# Patient Record
Sex: FEMALE | Race: WHITE | Employment: OTHER | URBAN - METROPOLITAN AREA
[De-identification: names, ages, dates, MRNs, and addresses within clinical notes are randomized per-mention and may not be internally consistent; named-entity substitution may affect disease eponyms.]

---

## 2020-06-19 ENCOUNTER — HOSPITAL ENCOUNTER (INPATIENT)
Age: 70
LOS: 1 days | Discharge: HOME | DRG: 247 | End: 2020-06-20
Attending: EMERGENCY MEDICINE | Admitting: INTERNAL MEDICINE
Payer: MEDICARE

## 2020-06-19 ENCOUNTER — APPOINTMENT (OUTPATIENT)
Dept: GENERAL RADIOLOGY | Age: 70
DRG: 247 | End: 2020-06-19
Attending: EMERGENCY MEDICINE
Payer: MEDICARE

## 2020-06-19 ENCOUNTER — APPOINTMENT (OUTPATIENT)
Dept: NON INVASIVE DIAGNOSTICS | Age: 70
DRG: 247 | End: 2020-06-19
Attending: INTERNAL MEDICINE
Payer: MEDICARE

## 2020-06-19 DIAGNOSIS — I48.0 PAROXYSMAL ATRIAL FIBRILLATION (HCC): Primary | ICD-10-CM

## 2020-06-19 DIAGNOSIS — I21.4 NSTEMI (NON-ST ELEVATED MYOCARDIAL INFARCTION) (HCC): ICD-10-CM

## 2020-06-19 PROBLEM — E03.9 HYPOTHYROID: Status: ACTIVE | Noted: 2020-06-19

## 2020-06-19 PROBLEM — I48.91 ATRIAL FIBRILLATION WITH RAPID VENTRICULAR RESPONSE (HCC): Status: ACTIVE | Noted: 2020-06-19

## 2020-06-19 LAB
ALBUMIN SERPL-MCNC: 3.7 G/DL (ref 3.5–5)
ALBUMIN/GLOB SERPL: 0.9 {RATIO} (ref 1.1–2.2)
ALP SERPL-CCNC: 47 U/L (ref 45–117)
ALT SERPL-CCNC: 52 U/L (ref 12–78)
ANION GAP SERPL CALC-SCNC: 5 MMOL/L (ref 5–15)
APTT PPP: 22.2 SEC (ref 22.1–32)
APTT PPP: 51.3 SEC (ref 22.1–32)
AST SERPL-CCNC: 37 U/L (ref 15–37)
AV VELOCITY RATIO: 0.5
AV VTI RATIO: 0.6
BASOPHILS # BLD: 0 K/UL (ref 0–0.1)
BASOPHILS NFR BLD: 1 % (ref 0–1)
BILIRUB SERPL-MCNC: 0.4 MG/DL (ref 0.2–1)
BNP SERPL-MCNC: 536 PG/ML
BUN SERPL-MCNC: 18 MG/DL (ref 6–20)
BUN/CREAT SERPL: 14 (ref 12–20)
CALCIUM SERPL-MCNC: 9.6 MG/DL (ref 8.5–10.1)
CHLORIDE SERPL-SCNC: 102 MMOL/L (ref 97–108)
CK SERPL-CCNC: 175 U/L (ref 26–192)
CO2 SERPL-SCNC: 31 MMOL/L (ref 21–32)
COMMENT, HOLDF: NORMAL
CREAT SERPL-MCNC: 1.26 MG/DL (ref 0.55–1.02)
DIFFERENTIAL METHOD BLD: NORMAL
ECHO AO ROOT DIAM: 3.09 CM
ECHO AV AREA PEAK VELOCITY: 1.5 CM2
ECHO AV AREA VTI: 1.7 CM2
ECHO AV AREA/BSA PEAK VELOCITY: 0.8 CM2/M2
ECHO AV AREA/BSA VTI: 0.9 CM2/M2
ECHO AV MEAN GRADIENT: 8.7 MMHG
ECHO AV MEAN VELOCITY: 1.38 M/S
ECHO AV PEAK GRADIENT: 17.5 MMHG
ECHO AV PEAK VELOCITY: 209.2 CM/S
ECHO AV VTI: 41.09 CM
ECHO LA AREA 4C: 12.2 CM2
ECHO LA MAJOR AXIS: 3.3 CM
ECHO LA TO AORTIC ROOT RATIO: 1.07
ECHO LA VOL 2C: 36.15 ML (ref 22–52)
ECHO LA VOL 4C: 24.43 ML (ref 22–52)
ECHO LA VOL BP: 30.6 ML (ref 22–52)
ECHO LA VOL/BSA BIPLANE: 16.42 ML/M2 (ref 16–28)
ECHO LA VOLUME INDEX A2C: 19.39 ML/M2 (ref 16–28)
ECHO LA VOLUME INDEX A4C: 13.11 ML/M2 (ref 16–28)
ECHO LV E' LATERAL VELOCITY: 4.66 CENTIMETER/SECOND
ECHO LV E' SEPTAL VELOCITY: 5.19 CENTIMETER/SECOND
ECHO LV EDV TEICHHOLZ: 0.66 ML
ECHO LV ESV TEICHHOLZ: 0.28 ML
ECHO LV INTERNAL DIMENSION DIASTOLIC: 4.81 CM (ref 3.9–5.3)
ECHO LV INTERNAL DIMENSION SYSTOLIC: 3.34 CM
ECHO LV IVSD: 0.72 CM (ref 0.6–0.9)
ECHO LV MASS 2D: 123.5 G (ref 67–162)
ECHO LV MASS INDEX 2D: 66.3 G/M2 (ref 43–95)
ECHO LV POSTERIOR WALL DIASTOLIC: 0.71 CM (ref 0.6–0.9)
ECHO LVOT DIAM: 1.94 CM
ECHO LVOT PEAK GRADIENT: 4.3 MMHG
ECHO LVOT PEAK VELOCITY: 103.56 CM/S
ECHO LVOT SV: 71.5 ML
ECHO LVOT VTI: 24.09 CM
ECHO RV INTERNAL DIMENSION: 2.96 CM
ECHO RV TAPSE: 1.32 CM (ref 1.5–2)
EOSINOPHIL # BLD: 0.1 K/UL (ref 0–0.4)
EOSINOPHIL NFR BLD: 3 % (ref 0–7)
ERYTHROCYTE [DISTWIDTH] IN BLOOD BY AUTOMATED COUNT: 12.4 % (ref 11.5–14.5)
GLOBULIN SER CALC-MCNC: 4.1 G/DL (ref 2–4)
GLUCOSE SERPL-MCNC: 130 MG/DL (ref 65–100)
HCT VFR BLD AUTO: 38.2 % (ref 35–47)
HGB BLD-MCNC: 12.9 G/DL (ref 11.5–16)
IMM GRANULOCYTES # BLD AUTO: 0 K/UL (ref 0–0.04)
IMM GRANULOCYTES NFR BLD AUTO: 0 % (ref 0–0.5)
LVFS 2D: 30.43 %
LVOT MG: 2.19 MMHG
LVOT MV: 0.7 CM/S
LVSV (TEICH): 32.98 ML
LYMPHOCYTES # BLD: 1.7 K/UL (ref 0.8–3.5)
LYMPHOCYTES NFR BLD: 41 % (ref 12–49)
MAGNESIUM SERPL-MCNC: 1.9 MG/DL (ref 1.6–2.4)
MCH RBC QN AUTO: 31 PG (ref 26–34)
MCHC RBC AUTO-ENTMCNC: 33.8 G/DL (ref 30–36.5)
MCV RBC AUTO: 91.8 FL (ref 80–99)
MONOCYTES # BLD: 0.4 K/UL (ref 0–1)
MONOCYTES NFR BLD: 9 % (ref 5–13)
NEUTS SEG # BLD: 1.9 K/UL (ref 1.8–8)
NEUTS SEG NFR BLD: 46 % (ref 32–75)
NRBC # BLD: 0 K/UL (ref 0–0.01)
NRBC BLD-RTO: 0 PER 100 WBC
PHOSPHATE SERPL-MCNC: 3.6 MG/DL (ref 2.6–4.7)
PLATELET # BLD AUTO: 211 K/UL (ref 150–400)
PMV BLD AUTO: 10.1 FL (ref 8.9–12.9)
POTASSIUM SERPL-SCNC: 3.3 MMOL/L (ref 3.5–5.1)
PROT SERPL-MCNC: 7.8 G/DL (ref 6.4–8.2)
RBC # BLD AUTO: 4.16 M/UL (ref 3.8–5.2)
SAMPLES BEING HELD,HOLD: NORMAL
SODIUM SERPL-SCNC: 138 MMOL/L (ref 136–145)
THERAPEUTIC RANGE,PTTT: ABNORMAL SECS (ref 58–77)
THERAPEUTIC RANGE,PTTT: NORMAL SECS (ref 58–77)
TROPONIN I BLD-MCNC: 0.1 NG/ML (ref 0–0.08)
TROPONIN I SERPL-MCNC: 0.16 NG/ML
TROPONIN I SERPL-MCNC: 0.77 NG/ML
TSH SERPL DL<=0.05 MIU/L-ACNC: 2.32 UIU/ML (ref 0.36–3.74)
WBC # BLD AUTO: 4.2 K/UL (ref 3.6–11)

## 2020-06-19 PROCEDURE — 77030029065 HC DRSG HEMO QCLOT ZMED -B

## 2020-06-19 PROCEDURE — 3E03317 INTRODUCTION OF OTHER THROMBOLYTIC INTO PERIPHERAL VEIN, PERCUTANEOUS APPROACH: ICD-10-PCS | Performed by: INTERNAL MEDICINE

## 2020-06-19 PROCEDURE — 84484 ASSAY OF TROPONIN QUANT: CPT

## 2020-06-19 PROCEDURE — 77030038269 HC DRN EXT URIN PURWCK BARD -A

## 2020-06-19 PROCEDURE — 99285 EMERGENCY DEPT VISIT HI MDM: CPT

## 2020-06-19 PROCEDURE — 83880 ASSAY OF NATRIURETIC PEPTIDE: CPT

## 2020-06-19 PROCEDURE — 99153 MOD SED SAME PHYS/QHP EA: CPT | Performed by: INTERNAL MEDICINE

## 2020-06-19 PROCEDURE — 74011000258 HC RX REV CODE- 258: Performed by: INTERNAL MEDICINE

## 2020-06-19 PROCEDURE — 74011000258 HC RX REV CODE- 258: Performed by: EMERGENCY MEDICINE

## 2020-06-19 PROCEDURE — 027035Z DILATION OF CORONARY ARTERY, ONE ARTERY WITH TWO DRUG-ELUTING INTRALUMINAL DEVICES, PERCUTANEOUS APPROACH: ICD-10-PCS | Performed by: INTERNAL MEDICINE

## 2020-06-19 PROCEDURE — C1769 GUIDE WIRE: HCPCS | Performed by: INTERNAL MEDICINE

## 2020-06-19 PROCEDURE — 85347 COAGULATION TIME ACTIVATED: CPT

## 2020-06-19 PROCEDURE — 83735 ASSAY OF MAGNESIUM: CPT

## 2020-06-19 PROCEDURE — B2111ZZ FLUOROSCOPY OF MULTIPLE CORONARY ARTERIES USING LOW OSMOLAR CONTRAST: ICD-10-PCS | Performed by: INTERNAL MEDICINE

## 2020-06-19 PROCEDURE — 84443 ASSAY THYROID STIM HORMONE: CPT

## 2020-06-19 PROCEDURE — C1874 STENT, COATED/COV W/DEL SYS: HCPCS | Performed by: INTERNAL MEDICINE

## 2020-06-19 PROCEDURE — C1894 INTRO/SHEATH, NON-LASER: HCPCS | Performed by: INTERNAL MEDICINE

## 2020-06-19 PROCEDURE — C1753 CATH, INTRAVAS ULTRASOUND: HCPCS | Performed by: INTERNAL MEDICINE

## 2020-06-19 PROCEDURE — 84100 ASSAY OF PHOSPHORUS: CPT

## 2020-06-19 PROCEDURE — 85025 COMPLETE CBC W/AUTO DIFF WBC: CPT

## 2020-06-19 PROCEDURE — 93306 TTE W/DOPPLER COMPLETE: CPT

## 2020-06-19 PROCEDURE — 74011250637 HC RX REV CODE- 250/637: Performed by: INTERNAL MEDICINE

## 2020-06-19 PROCEDURE — 85730 THROMBOPLASTIN TIME PARTIAL: CPT

## 2020-06-19 PROCEDURE — 4A023N7 MEASUREMENT OF CARDIAC SAMPLING AND PRESSURE, LEFT HEART, PERCUTANEOUS APPROACH: ICD-10-PCS | Performed by: INTERNAL MEDICINE

## 2020-06-19 PROCEDURE — 93005 ELECTROCARDIOGRAM TRACING: CPT

## 2020-06-19 PROCEDURE — 77030004522 HC CATH ANGI DX EXPO BSC -A: Performed by: INTERNAL MEDICINE

## 2020-06-19 PROCEDURE — 92928 PRQ TCAT PLMT NTRAC ST 1 LES: CPT | Performed by: INTERNAL MEDICINE

## 2020-06-19 PROCEDURE — 74011636320 HC RX REV CODE- 636/320: Performed by: INTERNAL MEDICINE

## 2020-06-19 PROCEDURE — 93458 L HRT ARTERY/VENTRICLE ANGIO: CPT | Performed by: INTERNAL MEDICINE

## 2020-06-19 PROCEDURE — 77030013715 HC INFL SYS MRTM -B: Performed by: INTERNAL MEDICINE

## 2020-06-19 PROCEDURE — 74011250636 HC RX REV CODE- 250/636: Performed by: INTERNAL MEDICINE

## 2020-06-19 PROCEDURE — 74011000250 HC RX REV CODE- 250: Performed by: EMERGENCY MEDICINE

## 2020-06-19 PROCEDURE — C1725 CATH, TRANSLUMIN NON-LASER: HCPCS | Performed by: INTERNAL MEDICINE

## 2020-06-19 PROCEDURE — 96374 THER/PROPH/DIAG INJ IV PUSH: CPT

## 2020-06-19 PROCEDURE — 77030012468 HC VLV BLEEDBK CNTRL ABBT -B: Performed by: INTERNAL MEDICINE

## 2020-06-19 PROCEDURE — 99152 MOD SED SAME PHYS/QHP 5/>YRS: CPT | Performed by: INTERNAL MEDICINE

## 2020-06-19 PROCEDURE — B2151ZZ FLUOROSCOPY OF LEFT HEART USING LOW OSMOLAR CONTRAST: ICD-10-PCS | Performed by: INTERNAL MEDICINE

## 2020-06-19 PROCEDURE — 80053 COMPREHEN METABOLIC PANEL: CPT

## 2020-06-19 PROCEDURE — 36415 COLL VENOUS BLD VENIPUNCTURE: CPT

## 2020-06-19 PROCEDURE — 74011250637 HC RX REV CODE- 250/637: Performed by: EMERGENCY MEDICINE

## 2020-06-19 PROCEDURE — 96376 TX/PRO/DX INJ SAME DRUG ADON: CPT

## 2020-06-19 PROCEDURE — 77030004532 HC CATH ANGI DX IMP BSC -A: Performed by: INTERNAL MEDICINE

## 2020-06-19 PROCEDURE — 74011250637 HC RX REV CODE- 250/637: Performed by: SPECIALIST

## 2020-06-19 PROCEDURE — 74011250636 HC RX REV CODE- 250/636: Performed by: EMERGENCY MEDICINE

## 2020-06-19 PROCEDURE — 77030008543 HC TBNG MON PRSS MRTM -A: Performed by: INTERNAL MEDICINE

## 2020-06-19 PROCEDURE — 74011000250 HC RX REV CODE- 250: Performed by: INTERNAL MEDICINE

## 2020-06-19 PROCEDURE — C1887 CATHETER, GUIDING: HCPCS | Performed by: INTERNAL MEDICINE

## 2020-06-19 PROCEDURE — 71045 X-RAY EXAM CHEST 1 VIEW: CPT

## 2020-06-19 PROCEDURE — 65660000000 HC RM CCU STEPDOWN

## 2020-06-19 PROCEDURE — 82550 ASSAY OF CK (CPK): CPT

## 2020-06-19 PROCEDURE — 77030019569 HC BND COMPR RAD TERU -B: Performed by: INTERNAL MEDICINE

## 2020-06-19 DEVICE — XIENCE SIERRA™ EVEROLIMUS ELUTING CORONARY STENT SYSTEM 2.50 MM X 33 MM / RAPID-EXCHANGE
Type: IMPLANTABLE DEVICE | Status: FUNCTIONAL
Brand: XIENCE SIERRA™

## 2020-06-19 DEVICE — XIENCE SIERRA™ EVEROLIMUS ELUTING CORONARY STENT SYSTEM 2.50 MM X 18 MM / RAPID-EXCHANGE
Type: IMPLANTABLE DEVICE | Status: FUNCTIONAL
Brand: XIENCE SIERRA™

## 2020-06-19 RX ORDER — SODIUM CHLORIDE 0.9 % (FLUSH) 0.9 %
5-40 SYRINGE (ML) INJECTION AS NEEDED
Status: DISCONTINUED | OUTPATIENT
Start: 2020-06-19 | End: 2020-06-20 | Stop reason: HOSPADM

## 2020-06-19 RX ORDER — VITAMIN E 1000 UNIT
1000 CAPSULE ORAL DAILY
COMMUNITY

## 2020-06-19 RX ORDER — VERAPAMIL HYDROCHLORIDE 2.5 MG/ML
INJECTION, SOLUTION INTRAVENOUS AS NEEDED
Status: DISCONTINUED | OUTPATIENT
Start: 2020-06-19 | End: 2020-06-19 | Stop reason: HOSPADM

## 2020-06-19 RX ORDER — HEPARIN SODIUM 1000 [USP'U]/ML
INJECTION, SOLUTION INTRAVENOUS; SUBCUTANEOUS AS NEEDED
Status: DISCONTINUED | OUTPATIENT
Start: 2020-06-19 | End: 2020-06-19 | Stop reason: HOSPADM

## 2020-06-19 RX ORDER — SODIUM CHLORIDE 0.9 % (FLUSH) 0.9 %
5-40 SYRINGE (ML) INJECTION EVERY 8 HOURS
Status: DISCONTINUED | OUTPATIENT
Start: 2020-06-19 | End: 2020-06-20 | Stop reason: HOSPADM

## 2020-06-19 RX ORDER — MORPHINE SULFATE 2 MG/ML
2 INJECTION, SOLUTION INTRAMUSCULAR; INTRAVENOUS
Status: DISCONTINUED | OUTPATIENT
Start: 2020-06-19 | End: 2020-06-20 | Stop reason: HOSPADM

## 2020-06-19 RX ORDER — HYDROCODONE BITARTRATE AND ACETAMINOPHEN 5; 325 MG/1; MG/1
1 TABLET ORAL
Status: DISCONTINUED | OUTPATIENT
Start: 2020-06-19 | End: 2020-06-20 | Stop reason: HOSPADM

## 2020-06-19 RX ORDER — GUAIFENESIN 100 MG/5ML
81 LIQUID (ML) ORAL DAILY
Qty: 1 TAB | Refills: 1 | Status: SHIPPED
Start: 2020-06-20 | End: 2020-09-03

## 2020-06-19 RX ORDER — GUAIFENESIN 100 MG/5ML
81 LIQUID (ML) ORAL DAILY
Status: DISCONTINUED | OUTPATIENT
Start: 2020-06-20 | End: 2020-06-20 | Stop reason: HOSPADM

## 2020-06-19 RX ORDER — METOPROLOL TARTRATE 25 MG/1
25 TABLET, FILM COATED ORAL EVERY 12 HOURS
Status: DISCONTINUED | OUTPATIENT
Start: 2020-06-19 | End: 2020-06-19 | Stop reason: SDUPTHER

## 2020-06-19 RX ORDER — CHOLECALCIFEROL TAB 125 MCG (5000 UNIT) 125 MCG
5000 TAB ORAL DAILY
COMMUNITY

## 2020-06-19 RX ORDER — HEPARIN SODIUM 10000 [USP'U]/100ML
12-25 INJECTION, SOLUTION INTRAVENOUS
Status: DISCONTINUED | OUTPATIENT
Start: 2020-06-19 | End: 2020-06-19

## 2020-06-19 RX ORDER — LOSARTAN POTASSIUM AND HYDROCHLOROTHIAZIDE 25; 100 MG/1; MG/1
1 TABLET ORAL
COMMUNITY
End: 2020-06-20

## 2020-06-19 RX ORDER — NALOXONE HYDROCHLORIDE 0.4 MG/ML
0.4 INJECTION, SOLUTION INTRAMUSCULAR; INTRAVENOUS; SUBCUTANEOUS AS NEEDED
Status: DISCONTINUED | OUTPATIENT
Start: 2020-06-19 | End: 2020-06-20 | Stop reason: HOSPADM

## 2020-06-19 RX ORDER — ASPIRIN 325 MG
325 TABLET ORAL ONCE
Status: COMPLETED | OUTPATIENT
Start: 2020-06-19 | End: 2020-06-19

## 2020-06-19 RX ORDER — SODIUM CHLORIDE 9 MG/ML
INJECTION, SOLUTION INTRAVENOUS
Status: COMPLETED | OUTPATIENT
Start: 2020-06-19 | End: 2020-06-19

## 2020-06-19 RX ORDER — LIDOCAINE HYDROCHLORIDE 10 MG/ML
INJECTION INFILTRATION; PERINEURAL AS NEEDED
Status: DISCONTINUED | OUTPATIENT
Start: 2020-06-19 | End: 2020-06-19 | Stop reason: HOSPADM

## 2020-06-19 RX ORDER — LEVOTHYROXINE SODIUM 125 UG/1
125 TABLET ORAL
COMMUNITY

## 2020-06-19 RX ORDER — CLONAZEPAM 0.5 MG/1
0.5 TABLET ORAL
COMMUNITY
End: 2021-03-28

## 2020-06-19 RX ORDER — ROSUVASTATIN CALCIUM 5 MG/1
5 TABLET, COATED ORAL
Qty: 30 TAB | Refills: 2 | Status: SHIPPED | OUTPATIENT
Start: 2020-06-19 | End: 2020-06-26 | Stop reason: SDUPTHER

## 2020-06-19 RX ORDER — POTASSIUM CHLORIDE 750 MG/1
40 TABLET, FILM COATED, EXTENDED RELEASE ORAL
Status: COMPLETED | OUTPATIENT
Start: 2020-06-19 | End: 2020-06-19

## 2020-06-19 RX ORDER — ENOXAPARIN SODIUM 100 MG/ML
1 INJECTION SUBCUTANEOUS
Status: DISCONTINUED | OUTPATIENT
Start: 2020-06-19 | End: 2020-06-19

## 2020-06-19 RX ORDER — SODIUM CHLORIDE 9 MG/ML
75 INJECTION, SOLUTION INTRAVENOUS CONTINUOUS
Status: DISPENSED | OUTPATIENT
Start: 2020-06-19 | End: 2020-06-20

## 2020-06-19 RX ORDER — HEPARIN SODIUM 5000 [USP'U]/ML
4000 INJECTION, SOLUTION INTRAVENOUS; SUBCUTANEOUS ONCE
Status: COMPLETED | OUTPATIENT
Start: 2020-06-19 | End: 2020-06-19

## 2020-06-19 RX ORDER — FENTANYL CITRATE 50 UG/ML
INJECTION, SOLUTION INTRAMUSCULAR; INTRAVENOUS AS NEEDED
Status: DISCONTINUED | OUTPATIENT
Start: 2020-06-19 | End: 2020-06-19 | Stop reason: HOSPADM

## 2020-06-19 RX ORDER — GLUCOSAM/CHONDRO/HERB 149/HYAL 750-100 MG
3 TABLET ORAL DAILY
COMMUNITY

## 2020-06-19 RX ORDER — DOCUSATE SODIUM 100 MG/1
100 CAPSULE, LIQUID FILLED ORAL 2 TIMES DAILY
Status: DISCONTINUED | OUTPATIENT
Start: 2020-06-19 | End: 2020-06-20 | Stop reason: HOSPADM

## 2020-06-19 RX ORDER — MIDAZOLAM HYDROCHLORIDE 1 MG/ML
INJECTION, SOLUTION INTRAMUSCULAR; INTRAVENOUS AS NEEDED
Status: DISCONTINUED | OUTPATIENT
Start: 2020-06-19 | End: 2020-06-19 | Stop reason: HOSPADM

## 2020-06-19 RX ORDER — HEPARIN SODIUM 200 [USP'U]/100ML
INJECTION, SOLUTION INTRAVENOUS
Status: COMPLETED | OUTPATIENT
Start: 2020-06-19 | End: 2020-06-19

## 2020-06-19 RX ORDER — DILTIAZEM HYDROCHLORIDE 5 MG/ML
20 INJECTION INTRAVENOUS
Status: COMPLETED | OUTPATIENT
Start: 2020-06-19 | End: 2020-06-19

## 2020-06-19 RX ORDER — DIPHENHYDRAMINE HYDROCHLORIDE 50 MG/ML
12.5 INJECTION, SOLUTION INTRAMUSCULAR; INTRAVENOUS
Status: DISCONTINUED | OUTPATIENT
Start: 2020-06-19 | End: 2020-06-20 | Stop reason: HOSPADM

## 2020-06-19 RX ORDER — ACETAMINOPHEN 325 MG/1
650 TABLET ORAL
Status: DISCONTINUED | OUTPATIENT
Start: 2020-06-19 | End: 2020-06-20 | Stop reason: HOSPADM

## 2020-06-19 RX ORDER — ROSUVASTATIN CALCIUM 10 MG/1
5 TABLET, COATED ORAL
Status: DISCONTINUED | OUTPATIENT
Start: 2020-06-19 | End: 2020-06-20 | Stop reason: HOSPADM

## 2020-06-19 RX ORDER — METOPROLOL TARTRATE 25 MG/1
25 TABLET, FILM COATED ORAL EVERY 12 HOURS
Status: DISCONTINUED | OUTPATIENT
Start: 2020-06-19 | End: 2020-06-20 | Stop reason: HOSPADM

## 2020-06-19 RX ORDER — SODIUM CHLORIDE AND POTASSIUM CHLORIDE .9; .15 G/100ML; G/100ML
SOLUTION INTRAVENOUS CONTINUOUS
Status: DISCONTINUED | OUTPATIENT
Start: 2020-06-19 | End: 2020-06-20 | Stop reason: HOSPADM

## 2020-06-19 RX ORDER — ONDANSETRON 2 MG/ML
4 INJECTION INTRAMUSCULAR; INTRAVENOUS
Status: DISCONTINUED | OUTPATIENT
Start: 2020-06-19 | End: 2020-06-20 | Stop reason: HOSPADM

## 2020-06-19 RX ORDER — NITROGLYCERIN 0.4 MG/1
0.4 TABLET SUBLINGUAL
COMMUNITY

## 2020-06-19 RX ORDER — LIOTHYRONINE SODIUM 5 UG/1
2.5 TABLET ORAL DAILY
COMMUNITY

## 2020-06-19 RX ADMIN — Medication 10 ML: at 21:46

## 2020-06-19 RX ADMIN — ASPIRIN 325 MG: 325 TABLET ORAL at 04:24

## 2020-06-19 RX ADMIN — ROSUVASTATIN CALCIUM 5 MG: 10 TABLET, COATED ORAL at 21:44

## 2020-06-19 RX ADMIN — METOPROLOL TARTRATE 25 MG: 25 TABLET, FILM COATED ORAL at 21:43

## 2020-06-19 RX ADMIN — DOCUSATE SODIUM 100 MG: 100 CAPSULE, LIQUID FILLED ORAL at 08:12

## 2020-06-19 RX ADMIN — HEPARIN SODIUM 4000 UNITS: 5000 INJECTION INTRAVENOUS; SUBCUTANEOUS at 06:46

## 2020-06-19 RX ADMIN — HEPARIN SODIUM 12 UNITS/KG/HR: 10000 INJECTION, SOLUTION INTRAVENOUS at 06:48

## 2020-06-19 RX ADMIN — NITROGLYCERIN 1 INCH: 20 OINTMENT TOPICAL at 04:25

## 2020-06-19 RX ADMIN — DILTIAZEM HYDROCHLORIDE 20 MG: 5 INJECTION INTRAVENOUS at 04:13

## 2020-06-19 RX ADMIN — Medication 10 ML: at 08:20

## 2020-06-19 RX ADMIN — SODIUM CHLORIDE 5 MG/HR: 900 INJECTION, SOLUTION INTRAVENOUS at 05:08

## 2020-06-19 RX ADMIN — METOPROLOL TARTRATE 25 MG: 25 TABLET, FILM COATED ORAL at 06:46

## 2020-06-19 RX ADMIN — POTASSIUM CHLORIDE 40 MEQ: 750 TABLET, FILM COATED, EXTENDED RELEASE ORAL at 05:54

## 2020-06-19 RX ADMIN — SODIUM CHLORIDE AND POTASSIUM CHLORIDE: 9; 1.49 INJECTION, SOLUTION INTRAVENOUS at 08:15

## 2020-06-19 NOTE — ED NOTES
TRANSFER - OUT REPORT:    Verbal report given to 2309 Monika Atkins RN (name) on Ivan Lopez  being transferred to [facility] (unit) for routine progression of care       Report consisted of patients Situation, Background, Assessment and   Recommendations(SBAR). Information from the following report(s) SBAR, Kardex, ED Summary, [facility] and Recent Results was reviewed with the receiving nurse. Lines:   Peripheral IV 06/19/20 Right Antecubital (Active)   Site Assessment Clean, dry, & intact 6/19/2020  4:07 AM   Phlebitis Assessment 0 6/19/2020  4:07 AM   Infiltration Assessment 0 6/19/2020  4:07 AM   Dressing Status Clean, dry, & intact 6/19/2020  4:07 AM   Dressing Type Transparent 6/19/2020  4:07 AM   Hub Color/Line Status Pink 6/19/2020  4:07 AM   Action Taken Catheter retaped 6/19/2020  4:07 AM       Peripheral IV 20/76/26 Left Basilic (Active)   Site Assessment Clean, dry, & intact 6/19/2020  7:11 AM   Phlebitis Assessment 0 6/19/2020  7:11 AM   Infiltration Assessment 0 6/19/2020  7:11 AM   Dressing Status Clean, dry, & intact 6/19/2020  7:11 AM   Dressing Type Transparent 6/19/2020  7:11 AM   Hub Color/Line Status Blue 6/19/2020  7:11 AM   Action Taken Catheter retaped 6/19/2020  7:11 AM        Opportunity for questions and clarification was provided.       Patient transported with:   Monitor  Registered Nurse

## 2020-06-19 NOTE — Clinical Note
Lesion: Located in the Proximal Cx. Stent inserted. Stent deployed. Multiple inflations used. First inflation pressure = 10 saige; inflation time: 30 sec.

## 2020-06-19 NOTE — PROGRESS NOTES
0850-TRANSFER - IN REPORT:    Verbal report received from Chadwick Reilly RN(name) on Ivan Lopez  being received from ED(unit) for routine progression of care      Report consisted of patients Situation, Background, Assessment and   Recommendations(SBAR). Information from the following report(s) SBAR, Kardex, ED Summary, OR Summary, Procedure Summary, Intake/Output, MAR, Accordion, Recent Results, Med Rec Status and Cardiac Rhythm AFib/NSR was reviewed with the receiving nurse. Opportunity for questions and clarification was provided. Assessment completed upon patients arrival to unit and care assumed. 1187- Patient arrived on unit. No c/o chest pain. Resting quietly in the bed.     1200-Patient in cath lab    1800-patient arrived back from cath lab. Dressing to R wrist and groin intact. Splint to R wrist. Per report patient was educated on not to bend that wrist. No signs of bleeding. Pulses present and palpable. No c/o pain. 1900-Bedside and Verbal shift change report given to Lucho Pacheco (oncoming nurse) by Rosas Solano RN (offgoing nurse). Report included the following information SBAR, Kardex, ED Summary, OR Summary, Procedure Summary, Intake/Output, Accordion, Recent Results, Med Rec Status and Cardiac Rhythm NSR.

## 2020-06-19 NOTE — ED TRIAGE NOTES
Pt arrives with a c/c of chest pain. Woke up at 0300 with sudden onset chest discomfort with tingling radiating to left shoulder. Reports taking Tums and 2 sublingual nitro with little. Denies nausea, vomiting, fever.

## 2020-06-19 NOTE — CONSULTS
Valorie Morris MD. [facility]              Patient: Itzel Ferris  : 1950      Today's Date: 2020            HISTORY OF PRESENT ILLNESS:     History of Present Illness:  Ms. Delonte Pineda is a 64 yo hx of HTN, hypothyroid, presented w/ chest pain (5/10) starting 1-2 hours prior to ED arrival. She stated that she woke up with the discomfort then took Tums, followed by 2 tablet of sublingual nitroglycerin with mild relief of discomfort. She had a normal stress test and echocardiogram approximately 2 months ago performed by her cardiologist Dr. Marija Guido. In ED she was found to have Afib with RVR - 's. EKG with ST depression in V2-V4. She denies SOB, vomiting, diaphoresis. Initial Trop was 0.16. She's had some mild chest tightness lately even prior to tonight. She is pain free this AM.  Is back in sinus this AM.   Denies any exposure to someone with COVID>       MEDICATIONS:             Prior to Admission medications    Medication Sig Start Date End Date Taking? Authorizing Provider   HYDROCHLOROTHIAZIDE PO Take  by mouth.     Yes Provider, Historical   levothyroxine sodium (LEVOTHYROXINE PO) Take  by mouth.     Yes Provider, Historical          Allergies   Allergen Reactions    Neosporin Plus [Liuvw-Bxiei-Gdfnmxu-Pramoxine] Rash    Pcn [Penicillins] Rash     Childhood rash             SOCIAL HISTORY:     Social History     Tobacco Use    Smoking status: Never Smoker   Substance Use Topics    Alcohol use: Not Currently    Drug use: Not on file         FAMILY HISTORY:     Family History   Problem Relation Age of Onset    Heart Disease Mother     Heart Disease Father            REVIEW OF SYMPTOMS:     Review of Symptoms:  Constitutional: Negative for fever, chills  HEENT: Negative for nosebleeds, tinnitus, and vision changes. Respiratory: Negative for cough, wheezing  Cardiovascular: Negative for orthopnea, claudication, syncope, and PND.    Gastrointestinal: Negative for abdominal pain, diarrhea, melena. Genitourinary: Negative for dysuria  Musculoskeletal: Negative for myalgias. Skin: Negative for rash  Heme: No problems bleeding. Neurological: Negative for speech change and focal weakness. PHYSICAL EXAM:     Physical Exam:  Visit Vitals  /52   Pulse (!) 58   Temp 97.6 °F (36.4 °C)   Resp 20   Ht 5' 4\" (1.626 m)   Wt 174 lb (78.9 kg)   SpO2 95%   BMI 29.87 kg/m²      Patient appears generally well, mood and affect are appropriate and pleasant. HEENT:  Hearing intact, non-icteric, normocephalic, atraumatic. Neck Exam: Supple, No JVD or carotid bruits. Lung Exam: Clear to auscultation, even breath sounds. Cardiac Exam: Irregularly, irregular with no murmur or rub  Abdomen: Soft, non-tender, normal bowel sounds. No bruits or masses. Extremities: MAW, No lower extremity edema. Vascular: 2+ dorsalis pedis pulses bilaterally. Psych: Appropriate affect  Neuro - Grossly intact        LABS / OTHER STUDIES:       Recent Results (from the past 24 hour(s))   CBC WITH AUTOMATED DIFF    Collection Time: 06/19/20  4:04 AM   Result Value Ref Range    WBC 4.2 3.6 - 11.0 K/uL    RBC 4.16 3.80 - 5.20 M/uL    HGB 12.9 11.5 - 16.0 g/dL    HCT 38.2 35.0 - 47.0 %    MCV 91.8 80.0 - 99.0 FL    MCH 31.0 26.0 - 34.0 PG    MCHC 33.8 30.0 - 36.5 g/dL    RDW 12.4 11.5 - 14.5 %    PLATELET 746 057 - 940 K/uL    MPV 10.1 8.9 - 12.9 FL    NRBC 0.0 0  WBC    ABSOLUTE NRBC 0.00 0.00 - 0.01 K/uL    NEUTROPHILS 46 32 - 75 %    LYMPHOCYTES 41 12 - 49 %    MONOCYTES 9 5 - 13 %    EOSINOPHILS 3 0 - 7 %    BASOPHILS 1 0 - 1 %    IMMATURE GRANULOCYTES 0 0.0 - 0.5 %    ABS. NEUTROPHILS 1.9 1.8 - 8.0 K/UL    ABS. LYMPHOCYTES 1.7 0.8 - 3.5 K/UL    ABS. MONOCYTES 0.4 0.0 - 1.0 K/UL    ABS. EOSINOPHILS 0.1 0.0 - 0.4 K/UL    ABS. BASOPHILS 0.0 0.0 - 0.1 K/UL    ABS. IMM.  GRANS. 0.0 0.00 - 0.04 K/UL    DF AUTOMATED     METABOLIC PANEL, COMPREHENSIVE    Collection Time: 06/19/20  4:04 AM   Result Value Ref Range    Sodium 138 136 - 145 mmol/L    Potassium 3.3 (L) 3.5 - 5.1 mmol/L    Chloride 102 97 - 108 mmol/L    CO2 31 21 - 32 mmol/L    Anion gap 5 5 - 15 mmol/L    Glucose 130 (H) 65 - 100 mg/dL    BUN 18 6 - 20 MG/DL    Creatinine 1.26 (H) 0.55 - 1.02 MG/DL    BUN/Creatinine ratio 14 12 - 20      GFR est AA 51 (L) >60 ml/min/1.73m2    GFR est non-AA 42 (L) >60 ml/min/1.73m2    Calcium 9.6 8.5 - 10.1 MG/DL    Bilirubin, total 0.4 0.2 - 1.0 MG/DL    ALT (SGPT) 52 12 - 78 U/L    AST (SGOT) 37 15 - 37 U/L    Alk. phosphatase 47 45 - 117 U/L    Protein, total 7.8 6.4 - 8.2 g/dL    Albumin 3.7 3.5 - 5.0 g/dL    Globulin 4.1 (H) 2.0 - 4.0 g/dL    A-G Ratio 0.9 (L) 1.1 - 2.2     NT-PRO BNP    Collection Time: 06/19/20  4:04 AM   Result Value Ref Range    NT pro- (H) <125 PG/ML   MAGNESIUM    Collection Time: 06/19/20  4:04 AM   Result Value Ref Range    Magnesium 1.9 1.6 - 2.4 mg/dL   PHOSPHORUS    Collection Time: 06/19/20  4:04 AM   Result Value Ref Range    Phosphorus 3.6 2.6 - 4.7 MG/DL   TROPONIN I    Collection Time: 06/19/20  4:04 AM   Result Value Ref Range    Troponin-I, Qt. 0.16 (H) <0.05 ng/mL   SAMPLES BEING HELD    Collection Time: 06/19/20  4:04 AM   Result Value Ref Range    SAMPLES BEING HELD 1DRKGRN,1SST,1BLU,1RED     COMMENT        Add-on orders for these samples will be processed based on acceptable specimen integrity and analyte stability, which may vary by analyte.    CK W/ REFLX CKMB    Collection Time: 06/19/20  4:04 AM   Result Value Ref Range     26 - 192 U/L   TSH 3RD GENERATION    Collection Time: 06/19/20  4:04 AM   Result Value Ref Range    TSH 2.32 0.36 - 3.74 uIU/mL   PTT    Collection Time: 06/19/20  4:04 AM   Result Value Ref Range    aPTT 22.2 22.1 - 32.0 sec    aPTT, therapeutic range     58.0 - 77.0 SECS   POC TROPONIN-I    Collection Time: 06/19/20  4:28 AM   Result Value Ref Range    Troponin-I (POC) 0.10 (H) 0.00 - 0.08 ng/mL             CARDIAC DIAGNOSTICS:     Cardiac Evaluation Includes:    EKG 6/19/20 - Afib with RVR, , St depression V2-V4 -- I viewed EKG myself       ASSESSMENT AND PLAN:     Assessment and Plan:  1) NSTEMI   - she presented with CP and found to have afib with RVR, anterior lead ST depression, and initial troponin 0.16  - She was started on IV Cardizem and IV heparin in the ED and her pain resolved in the ED.   - Start ASA, statin, BB  - I recommended a cardiac cath (risks of MI, CVA< CRISTEL, death, etc reviewed) and she agrees to proceed. 2) Afib with RVR  - new diagnosis on admission   - She has converted to NSR this AM  - will need [address] on discharge -- may need this in addition to antiplatelets if she gets a stent (defer to interventional cardiologist)   - continue metoprolol (just added)     3) HTN  - will use metoprolol rather than HCTZ    4) Dyslipidemia   - has had muscle pain from multiple statins  - agrees to try crestor 5 mg three days a week     5) She sees Dr. Tammy Wilburn as an outpatient       Karla Cross MD, [address], [address]  [address]  [address], [facility] [address]  Ph: [phone]   Ph [phone]

## 2020-06-19 NOTE — PROGRESS NOTES
[facility]: MED RECONCILIATION    Comments/Recommendations:   Patient alert and oriented for medication reconciliation, knowledgeable regarding medications with prompting. Patient took one NTG SL tablet this morning prior to hospital arrival.  Added PCN and neosporin allergies. Updated preferred pharmacy to [facility] on [employer]. Medications added:     Fish oil  Vitamin D  Vitamin K  Vitamin C  Clonazepam  Losartan/HCTZ  Liothyronine  NTG    Medications removed: HCTZ    Medications adjusted: Added dose and directions to levothyroxine     Information obtained from: patient, Rx query    Allergies: Neosporin plus [kkosi-ybfke-lgmkasp-pramoxine] and Pcn [penicillins]    Prior to Admission Medications:     Medication Documentation Review Audit       Reviewed by Akshat Vizcaino (Pharmacist) on 06/19/20 at 0750      Medication Sig Documenting Provider Last Dose Status Taking?   ascorbic acid, vitamin C, (Vitamin C) 1,000 mg tablet Take 1,000 mg by mouth daily. Provider, Historical 6/18/2020 AM Active Yes   cholecalciferol (VITAMIN D3) (5000 Units/125 mcg) tab tablet Take 5,000 Units by mouth daily. Provider, Historical 6/18/2020 AM Active Yes   clonazePAM (KlonoPIN) 0.5 mg tablet Take 0.5 mg by mouth nightly. Provider, Historical 6/18/2020 PM Active Yes   levothyroxine (SYNTHROID) 125 mcg tablet Take 125 mcg by mouth Daily (before breakfast). Provider, Historical 6/18/2020 AM Active Yes   liothyronine (CYTOMEL) 5 mcg tablet Take 2.5 mcg by mouth daily. Provider, Historical 6/18/2020 MA Active Yes   losartan-hydroCHLOROthiazide (HYZAAR) 100-25 mg per tablet Take 1 Tab by mouth nightly. Provider, Historical 6/18/2020 PM Active Yes   nitroglycerin (NITROSTAT) 0.4 mg SL tablet 0.4 mg by SubLINGual route every five (5) minutes as needed for Chest Pain. Up to 3 doses. Provider, Historical 6/19/2020 AM Active Yes   omega 3-DHA-EPA-fish oil 1,000 mg (120 mg-180 mg) capsule Take 3 Caps by mouth daily.  Provider, Historical 6/18/2020 AM Active Yes   VITAMIN K2 PO Take 1 Tab by mouth daily.  Provider, Historical 6/18/2020 AM Active Yes                      Thank You,   Akshat Morales, PharmD, BCPS   Contact: 1606

## 2020-06-19 NOTE — PROGRESS NOTES
Pt was seen and examined. Case was discussed with the admitting physician, Dr MCDONALD and Dr Almond Siemens. Pt is hemodynamically stable. Plan for cardiac cath today.

## 2020-06-19 NOTE — Clinical Note
Lesion located in the Proximal Cx. Balloon inserted. Balloon inflated using multiple inflations inflation technique. Lesion #1: Pressure = 10 saige; Duration = 20 sec. Inflation 2: Pressure = 8 saige; Duration = 20 sec.

## 2020-06-19 NOTE — PROGRESS NOTES
[address] [facility] [address] IN REPORT:    Verbal report received from [facility], [address] (name) on Stella Beaulieu  being received from [address] [place] [address] (unit) for routine progression of care      Report consisted of patients Situation, Background, Assessment and   Recommendations(SBAR). Information from the following report(s) Procedure Summary was reviewed with the receiving nurse. Opportunity for questions and clarification was provided. Assessment completed upon patients arrival to unit and care assumed. 2:28 PM    Sites intact  Pillow under RUE  WIC in place  No complaints  Monitoring      1615    Blood aspirated from sheath then Arterial sheath pulled 6 Fr RIGHT Groin. Quikclot applied and manual pressure held by Joann Moura RN.    1616    2 ml air released from TR Band. No bleeding or hematoma noted. Radial and Ulnar pulse on RIGHT wrist palpable. Pt tolerated well. Will continue to monitor. 1621    2 ml air released from TR Band. No bleeding or hematoma noted. Radial and Ulnar pulse on RIGHT wrist palpable. Pt tolerated well. Will continue to monitor. 1630    GROIN Hemostasis achieved at 1630. Dressing applied. Pt voices understanding of post procedure bedrest instructions. 1634    2 ml air released from TR Band. Oozing noted  4ml air returned to TR Band  Oozing stopped  Monitoring      5:05 PM    2 ml air released from TR Band. No bleeding or hematoma noted. Radial and Ulnar pulse on RIGHT wrist palpable. Pt tolerated well. Will continue to monitor. 1710    2 ml air released from TR Band. No bleeding or hematoma noted. Radial and Ulnar pulse on RIGHT wrist palpable. Pt tolerated well. Will continue to monitor. 1715    2 ml air released from TR Band. No bleeding or hematoma noted. Radial and Ulnar pulse on RIGHT wrist palpable. Pt tolerated well. Will continue to monitor. DR Severo Pattee given patient's 's phone number to call for update      1720    2 ml air released from TR Band.  No bleeding or hematoma noted. Radial and Ulnar pulse on RIGHT wrist palpable. Pt tolerated well. Will continue to monitor. 1725    3 ml air released from TR Band. No bleeding or hematoma noted. Radial and Ulnar pulse on RIGHT wrist palpable. Pt tolerated well. Will continue to monitor. Air release completed. TR Band removed from RIGHT RADIAL wrist. No bleeding or  Hematoma. Dressing applied. Wrist immobilizer in place. Radial and ulnar pulse remain palpable on affected extremity. Pt tolerated well. Instructions given to pt regarding movement and activity restrictions. Pt voiced understanding. 5:44 PM    TRANSFER - OUT REPORT:    Verbal report given to Cindy Elizabeth RN (name) on Dagmar Arceo  being transferred to [facility](unit) for routine progression of care       Report consisted of patients Situation, Background, Assessment and   Recommendations(SBAR). Information from the following report(s) SBAR and Procedure Summary was reviewed with the receiving nurse. Lines:   Peripheral IV 06/19/20 Right Antecubital (Active)   Site Assessment Clean, dry, & intact 6/19/2020  2:34 PM   Phlebitis Assessment 0 6/19/2020  2:34 PM   Infiltration Assessment 0 6/19/2020  2:34 PM   Dressing Status Clean 6/19/2020  2:34 PM   Dressing Type Transparent 6/19/2020  2:34 PM   Hub Color/Line Status Pink; Infusing 6/19/2020  2:34 PM   Action Taken Open ports on tubing capped 6/19/2020  9:26 AM       Peripheral IV 38/40/51 Left Basilic (Active)   Site Assessment Clean, dry, & intact 6/19/2020  2:42 PM   Phlebitis Assessment 0 6/19/2020  2:42 PM   Infiltration Assessment 0 6/19/2020  2:42 PM   Dressing Status Clean 6/19/2020  2:42 PM   Dressing Type Transparent 6/19/2020  2:42 PM   Hub Color/Line Status Blue;Flushed 6/19/2020  2:42 PM   Action Taken Open ports on tubing capped 6/19/2020  9:26 AM        Opportunity for questions and clarification was provided.       Patient transported with:   Monitor  Registered Nurse

## 2020-06-19 NOTE — PROGRESS NOTES
Reason for Admission:   NSTEMI                   RUR Score:          11% low           Plan for utilizing home health:      Patient has never had in the past, likely won't need at this admission    PCP: First and Last name:  Claritza Randolph MD   Name of Practice:    Are you a current patient: Yes/No: Yes   Approximate date of last visit: 3-4 months ago   Can you participate in a virtual visit with your PCP:  Patient thinks so                    Current Advanced Directive/Advance Care Plan:  Full code, no AD on file,  is [facility]                         Transition of Care Plan:                    I completed the assessment with the patient in her room. I wore a mask. Patient lives with her  in a 2 story home with three steps to enter. Prior to admission she states she is independent with ADLs and drives. She has never had any home health in the past and does not use any DME. Her  Vickie Blankenship is her emergency contact and can be reached at [phone]. He will be transporting her home when she is ready. She did seem anxious about staying here over the weekend. She usually gets her prescriptions at [place] [address] and they are typically covered. She sees Dr. Max Muro as her PCP and her last appointment was 3-4 months ago, but she states she has an appointment scheduled on Monday. No discharge needs identified at this time, no PT/OT consults, will continue to follow. Transition of Care Plan  1. Patient having a cath today  2. No needs identified at this time  3.  to transport when ready for discharge   4. Patient will have to follow up with PCP, cardiology as necessary   5. CM to continue to follow and assist with discharge planning. Care Management Interventions  PCP Verified by CM: Yes(Dr. Claritza Randolph)  Mode of Transport at Discharge:  Other (see comment)()  Transition of Care Consult (CM Consult): Discharge Planning  MyChart Signup: No  Discharge Durable Medical Equipment: No  Physical Therapy Consult: No  Occupational Therapy Consult: No  Speech Therapy Consult: No  Current Support Network: Lives with Spouse  Confirm Follow Up Transport: Self  Discharge Location  Discharge Placement: Home with family assistance    BETY Angeles

## 2020-06-19 NOTE — H&P
[facility]  [address] [facility], [place], [address] [place] [address]  [phone]    Admission History and Physical      NAME:  Alyx Alfonso   :   1950   MRN:  [id number]     PCP:  Alisa Whiting MD     Date/Time of service:  2020  5:36 AM        Subjective:     CHIEF COMPLAINT: chest discomfort     HISTORY OF PRESENT ILLNESS:     The patient is a 66 yo hx of HTN, hypothyroid, presented w/ chest pain, NSTEMI, afib RVR. The patient woke up this AM with midsternal chest pain. She described the pain as pressure, 5/10, non-radiating. Also denied SOB, nausea, vomiting, diaphoresis. The patient stated that she saw her Cardiologist, Dr. Lizette Bary, 2 months ago and had a negative echo/stress. In the ED, patient had afib with rate 120s. Initial Trop was 0.16. No Known Allergies    Prior to Admission medications    Medication Sig Start Date End Date Taking? Authorizing Provider   HYDROCHLOROTHIAZIDE PO Take  by mouth. Yes Provider, Historical   levothyroxine sodium (LEVOTHYROXINE PO) Take  by mouth. Yes Provider, Historical       Past Medical History:   Diagnosis Date    Hypertension     Hypothyroid         No past surgical history on file.     Social History     Tobacco Use    Smoking status: Never Smoker   Substance Use Topics    Alcohol use: Not Currently        Family History   Problem Relation Age of Onset    Heart Disease Mother     Heart Disease Father         Review of Systems:  (bold if positive, if negative)    Gen:  Eyes:  ENT:  CVS:   chest pain,Pulm:  GI:  :  MS:  Skin:  Psych:  Endo:  Hem:  Renal:  Neuro:          Objective:      VITALS:    Vital signs reviewed; most recent are:    Visit Vitals  /52   Pulse 99   Temp 97.6 °F (36.4 °C)   Resp 17   Ht 5' 4\" (1.626 m)   Wt 78.9 kg (174 lb)   SpO2 95%   BMI 29.87 kg/m²     SpO2 Readings from Last 6 Encounters:   20 95%        No intake or output data in the 24 hours ending 20 0536     Exam: Physical Exam:    Gen:  Well-developed, well-nourished, in no acute distress  HEENT:  Pink conjunctivae, PERRL, hearing intact to voice, moist mucous membranes  Neck:  Supple, without masses, thyroid non-tender  Resp:  No accessory muscle use, clear breath sounds without wheezes rales or rhonchi  Card: Tachy, irregular, 2/6 murmurs, normal S1, S2 without thrills, bruits or peripheral edema  Abd:  Soft, non-tender, non-distended, normoactive bowel sounds are present  Lymph:  No cervical adenopathy  Musc:  No cyanosis or clubbing  Skin:  No rashes   Neuro:  Cranial nerves 3-12 are grossly intact, follows commands appropriately  Psych:  Alert with good insight. Oriented to person, place, and time    Labs:    Recent Labs     06/19/20  0404   WBC 4.2   HGB 12.9   HCT 38.2        Recent Labs     06/19/20  0404      K 3.3*      CO2 31   *   BUN 18   CREA 1.26*   CA 9.6   MG 1.9   PHOS 3.6   ALB 3.7   TBILI 0.4   ALT 52     No results found for: GLUCPOC  No results for input(s): PH, PCO2, PO2, HCO3, FIO2 in the last 72 hours. No results for input(s): INR, INREXT in the last 72 hours. Radiology and EKG reviewed:   pending    **Old Records reviewed in [facility]**       Assessment/Plan:       Principal Problem:    66 yo hx of HTN, hypothyroid, presented w/ chest pain, NSTEMI, afib RVR    1) Chest pain/NSTEMI (non-ST elevated myocardial infarction): initial Trop 0.16. Will monitor on Step Down. Check EKG, cardiac enzymes, echo. Start O2, IV morphine prn, nitro prn, ASA, metoprolol, heparin gtt. Consult Cards for cath    2) Atrial fibrillation with rapid ventricular response: patient thought she might have had afib in the past.  Will cont dilt gtt and heparin gtt.   Defer to Cards    3) Hypothyroid: check TSH, resume synthroid after med rec    4) HTN: hold HCTZ for now    Risk of deterioration: high      Total time spent with patient: 79 Minutes **I personally saw and examined the patient during this time period**                 Care Plan discussed with: Patient, nursing, ED    Discussed:  Care Plan    Prophylaxis:  heparin gtt    Probable Disposition:  Home w/Family           ___________________________________________________    Attending Physician: Laurita Kuhn MD

## 2020-06-19 NOTE — PROGRESS NOTES
Afib on admn  Start Eliquis  Eliquis/Brilinta/ASA 81mg for 1 month. Thereafter Eliquis/Brilinta ( or Eliquis/Plavix) . OP follow up with primary cardiologist - [facility]    Updated  by tel about Cath/PCI    [employer].  MD, [facility]

## 2020-06-19 NOTE — Clinical Note
TRANSFER - OUT REPORT:     Verbal report given to: Piedad Waller . Report consisted of patient's Situation, Background, Assessment and   Recommendations(SBAR). Opportunity for questions and clarification was provided. Patient transported with a Registered Nurse.

## 2020-06-19 NOTE — Clinical Note
Lesion: Located in the Proximal Cx. Stent inserted. Multiple inflations used. First inflation pressure = 12 saige; inflation time: 30 sec. Second inflation pressure: 12 saige; inflation time: 24 sec.

## 2020-06-19 NOTE — Clinical Note
TRANSFER - IN REPORT:     Verbal report received from:  . Report consisted of patient's Situation, Background, Assessment and   Recommendations(SBAR). Opportunity for questions and clarification was provided. Assessment completed upon patient's arrival to unit and care assumed. Patient transported with a Registered Nurse.

## 2020-06-19 NOTE — Clinical Note
Lesion located in the Proximal Cx. Balloon inserted. Balloon inflated using multiple inflations inflation technique. Lesion #1: Pressure = 10 saige; Duration = 26 sec. Inflation 2: Pressure = 10 saige; Duration = 20 sec. Inflation 3: Pressure = 12 saige; Duration = 30 sec. Inflation 4: Pressure = 12 saige; Duration = 20 sec.

## 2020-06-19 NOTE — ED NOTES
Assumed care of patient. Introduced self as primary nurse using [address] [place] [address] Nw. Stretcher in low locked position with call bell within reach. Pt updated on plan of care and wait times. Pt instructed to use call bell if assistance is needed.

## 2020-06-19 NOTE — Clinical Note
Lesion located in the Proximal Cx. Balloon inserted. Balloon inflated using multiple inflations inflation technique.

## 2020-06-19 NOTE — PROCEDURES
BRIEF PROCEDURE NOTE    Date of Procedure: 6/19/2020   Preoperative Diagnosis: NSTEMI  Postoperative Diagnosis: PCI to Lcflex - FAM    Procedure: Left heart cath, LV angiography, coronary angiography  Interventional Cardiologist: Olimpia Patten MD  Assistant : none  Anesthesia: local + IV sedation  Estimated Blood Loss: Minimal  Findings:     R Radial access - 6 F sheath  Tortuous R SCV - Versicor wire used to get JR4 into asc aorta  Difficulty engaging coronaries - JR4/3 DRC - subselective inj  LCA - JL4/EBU3.5  - EBU catheter got a kink sec to torquing - unkinked by using guide wire and removed without any complications    R CFA access - ultrasound/fluoroscopic/micropuncture access    LCA - EBU 3 ; Unable to engage RCA with JR4/3 DRC/AL1    L Main: Large- distal 30%    LAD: Med to large; Mid 30%; D1 - Med - MLI    LCflex: Prox 100%    RCA: subselective inj; MLI - not well visualized    LVEDP: 9     LVEF: Not assessed    No significant gradient across aortic valve. PCI: EBU 3 guide  Wired with runthrough  Multiple dilatations with 2 by12 balloon - Lcflex diffuse severe dz ( prox /mid/distal) - Severe dz in OM  FAM 2.5 by 33 and 2.5 by 18 deployed in overlapping manner and overlap dilated with stent balloon  Post dil with 2.75 by 12   TARIK 0 before and TIMI3 after    Specimens Removed : None    Complications: None    Closure Device: TR band        See full cath note.     Complications: none    Olimpia Patten MD

## 2020-06-20 VITALS
OXYGEN SATURATION: 98 % | SYSTOLIC BLOOD PRESSURE: 129 MMHG | DIASTOLIC BLOOD PRESSURE: 49 MMHG | HEART RATE: 67 BPM | TEMPERATURE: 98.7 F | WEIGHT: 178.57 LBS | BODY MASS INDEX: 30.49 KG/M2 | HEIGHT: 64 IN | RESPIRATION RATE: 18 BRPM

## 2020-06-20 LAB
ALBUMIN SERPL-MCNC: 3.5 G/DL (ref 3.5–5)
ALBUMIN/GLOB SERPL: 1 {RATIO} (ref 1.1–2.2)
ALP SERPL-CCNC: 44 U/L (ref 45–117)
ALT SERPL-CCNC: 49 U/L (ref 12–78)
ANION GAP SERPL CALC-SCNC: 9 MMOL/L (ref 5–15)
AST SERPL-CCNC: 40 U/L (ref 15–37)
BILIRUB DIRECT SERPL-MCNC: 0.1 MG/DL (ref 0–0.2)
BILIRUB SERPL-MCNC: 0.7 MG/DL (ref 0.2–1)
BUN SERPL-MCNC: 16 MG/DL (ref 6–20)
BUN/CREAT SERPL: 13 (ref 12–20)
CALCIUM SERPL-MCNC: 9.2 MG/DL (ref 8.5–10.1)
CHLORIDE SERPL-SCNC: 106 MMOL/L (ref 97–108)
CHOLEST SERPL-MCNC: 314 MG/DL
CO2 SERPL-SCNC: 23 MMOL/L (ref 21–32)
CREAT SERPL-MCNC: 1.2 MG/DL (ref 0.55–1.02)
ERYTHROCYTE [DISTWIDTH] IN BLOOD BY AUTOMATED COUNT: 12.1 % (ref 11.5–14.5)
EST. AVERAGE GLUCOSE BLD GHB EST-MCNC: 123 MG/DL
GLOBULIN SER CALC-MCNC: 3.5 G/DL (ref 2–4)
GLUCOSE SERPL-MCNC: 100 MG/DL (ref 65–100)
HBA1C MFR BLD: 5.9 % (ref 4–5.6)
HCT VFR BLD AUTO: 35.7 % (ref 35–47)
HDLC SERPL-MCNC: 41 MG/DL
HDLC SERPL: 7.7 {RATIO} (ref 0–5)
HGB BLD-MCNC: 11.7 G/DL (ref 11.5–16)
LDLC SERPL CALC-MCNC: 202.6 MG/DL (ref 0–100)
LIPID PROFILE,FLP: ABNORMAL
MAGNESIUM SERPL-MCNC: 1.6 MG/DL (ref 1.6–2.4)
MCH RBC QN AUTO: 30.8 PG (ref 26–34)
MCHC RBC AUTO-ENTMCNC: 32.8 G/DL (ref 30–36.5)
MCV RBC AUTO: 93.9 FL (ref 80–99)
NRBC # BLD: 0 K/UL (ref 0–0.01)
NRBC BLD-RTO: 0 PER 100 WBC
PHOSPHATE SERPL-MCNC: 3.1 MG/DL (ref 2.6–4.7)
PLATELET # BLD AUTO: 200 K/UL (ref 150–400)
PMV BLD AUTO: 10.4 FL (ref 8.9–12.9)
POTASSIUM SERPL-SCNC: 4.3 MMOL/L (ref 3.5–5.1)
PROT SERPL-MCNC: 7 G/DL (ref 6.4–8.2)
RBC # BLD AUTO: 3.8 M/UL (ref 3.8–5.2)
SODIUM SERPL-SCNC: 138 MMOL/L (ref 136–145)
TRIGL SERPL-MCNC: 352 MG/DL (ref ?–150)
TROPONIN I SERPL-MCNC: 1.2 NG/ML
VLDLC SERPL CALC-MCNC: 70.4 MG/DL
WBC # BLD AUTO: 5.9 K/UL (ref 3.6–11)

## 2020-06-20 PROCEDURE — 85027 COMPLETE CBC AUTOMATED: CPT

## 2020-06-20 PROCEDURE — 80076 HEPATIC FUNCTION PANEL: CPT

## 2020-06-20 PROCEDURE — 84100 ASSAY OF PHOSPHORUS: CPT

## 2020-06-20 PROCEDURE — 74011250637 HC RX REV CODE- 250/637: Performed by: INTERNAL MEDICINE

## 2020-06-20 PROCEDURE — 94760 N-INVAS EAR/PLS OXIMETRY 1: CPT

## 2020-06-20 PROCEDURE — 83735 ASSAY OF MAGNESIUM: CPT

## 2020-06-20 PROCEDURE — 84484 ASSAY OF TROPONIN QUANT: CPT

## 2020-06-20 PROCEDURE — 80061 LIPID PANEL: CPT

## 2020-06-20 PROCEDURE — 83036 HEMOGLOBIN GLYCOSYLATED A1C: CPT

## 2020-06-20 PROCEDURE — 74011250637 HC RX REV CODE- 250/637: Performed by: SPECIALIST

## 2020-06-20 PROCEDURE — 80048 BASIC METABOLIC PNL TOTAL CA: CPT

## 2020-06-20 PROCEDURE — 36415 COLL VENOUS BLD VENIPUNCTURE: CPT

## 2020-06-20 PROCEDURE — 94762 N-INVAS EAR/PLS OXIMTRY CONT: CPT

## 2020-06-20 RX ORDER — METOPROLOL TARTRATE 25 MG/1
25 TABLET, FILM COATED ORAL EVERY 12 HOURS
Qty: 60 TAB | Refills: 1 | Status: SHIPPED | OUTPATIENT
Start: 2020-06-20 | End: 2020-06-26 | Stop reason: SDUPTHER

## 2020-06-20 RX ORDER — CLONAZEPAM 0.5 MG/1
0.5 TABLET ORAL
Status: DISCONTINUED | OUTPATIENT
Start: 2020-06-20 | End: 2020-06-20 | Stop reason: HOSPADM

## 2020-06-20 RX ORDER — LIOTHYRONINE SODIUM 5 UG/1
2.5 TABLET ORAL DAILY
Status: DISCONTINUED | OUTPATIENT
Start: 2020-06-20 | End: 2020-06-20 | Stop reason: HOSPADM

## 2020-06-20 RX ORDER — LEVOTHYROXINE SODIUM 125 UG/1
125 TABLET ORAL
Status: DISCONTINUED | OUTPATIENT
Start: 2020-06-20 | End: 2020-06-20 | Stop reason: HOSPADM

## 2020-06-20 RX ADMIN — DOCUSATE SODIUM 100 MG: 100 CAPSULE, LIQUID FILLED ORAL at 08:20

## 2020-06-20 RX ADMIN — LIOTHYRONINE SODIUM 2.5 MCG: 5 TABLET ORAL at 08:20

## 2020-06-20 RX ADMIN — HYDROCODONE BITARTRATE AND ACETAMINOPHEN 1 TABLET: 5; 325 TABLET ORAL at 03:44

## 2020-06-20 RX ADMIN — Medication 10 ML: at 08:21

## 2020-06-20 RX ADMIN — METOPROLOL TARTRATE 25 MG: 25 TABLET, FILM COATED ORAL at 08:20

## 2020-06-20 RX ADMIN — APIXABAN 5 MG: 5 TABLET, FILM COATED ORAL at 08:20

## 2020-06-20 RX ADMIN — Medication 10 ML: at 08:22

## 2020-06-20 RX ADMIN — LEVOTHYROXINE SODIUM 125 MCG: 0.12 TABLET ORAL at 08:20

## 2020-06-20 RX ADMIN — ASPIRIN 81 MG 81 MG: 81 TABLET ORAL at 08:20

## 2020-06-20 NOTE — DISCHARGE SUMMARY
Hospitalist Discharge Summary     Patient ID:    Vale Sandra  [id number]  66 y.o.  1950    Admit date of service: 6/19/2020    Discharge date of service: 6/20/2020    Admission Diagnoses: NSTEMI (non-ST elevated myocardial infarction) [facility]) [I21.4]    Chronic Diagnoses:    Problem List as of 6/20/2020 Date Reviewed: 6/19/2020          Codes Class Noted - Resolved    * (Principal) [facility]STEM[facility]non-ST elevated [facility]) [facility] ICD-10-CM: I21.4  ICD-9-CM: 410.70  6/19/2020 - Present        Atrial fibrillation with rapid ventricular response ([facility] ICD-10-CM: I48.91  ICD-9-CM: 427.31  6/19/2020 - Present        Hypothyroid ICD-10-CM: E03.9  ICD-9-CM: 244.9  6/19/2020 - Present              Discharge Medications:   Current Discharge Medication List      START taking these medications    Details   apixaban (ELIQUIS) 5 mg tablet Take 1 Tab by mouth two (2) times a day. Qty: 60 Tab, Refills: 1      metoprolol tartrate (LOPRESSOR) 25 mg tablet Take 1 Tab by mouth every twelve (12) hours. Qty: 60 Tab, Refills: 1      ticagrelor (BRILINTA) 90 mg tablet Take 1 Tab by mouth every twelve (12) hours every twelve (12) hours. Qty: 60 Tab, Refills: 11      aspirin 81 mg chewable tablet Take 1 Tab by mouth daily. Qty: 1 Tab, Refills: 1      rosuvastatin (CRESTOR) 5 mg tablet Take 1 Tab by mouth every Monday, Wednesday, Friday. Qty: 30 Tab, Refills: 2         CONTINUE these medications which have NOT CHANGED    Details   omega 3-DHA-EPA-fish oil 1,000 mg (120 mg-180 mg) capsule Take 3 Caps by mouth daily. cholecalciferol (VITAMIN D3) (5000 Units/125 mcg) tab tablet Take 5,000 Units by mouth daily. VITAMIN K2 PO Take 1 Tab by mouth daily. ascorbic acid, vitamin C, (Vitamin C) 1,000 mg tablet Take 1,000 mg by mouth daily. clonazePAM (KlonoPIN) 0.5 mg tablet Take 0.5 mg by mouth nightly. liothyronine (CYTOMEL) 5 mcg tablet Take 2.5 mcg by mouth daily.       levothyroxine (SYNTHROID) 125 mcg tablet Take 125 mcg by mouth Daily (before breakfast). nitroglycerin (NITROSTAT) 0.4 mg SL tablet 0.4 mg by SubLINGual route every five (5) minutes as needed for Chest Pain. Up to 3 doses. STOP taking these medications       losartan-hydroCHLOROthiazide (HYZAAR) 100-25 mg per tablet Comments:   Reason for Stopping: Follow up Care:    1. Koko Pope MD in 1-2 weeks  2. Cardiology     Diet:  Cardiac Diet    Disposition:  Home. Advanced Directive:    Discharge Exam:  See today's note. CONSULTATIONS: Cardiology    Significant Diagnostic Studies:   Recent Labs     06/20/20  0334 06/19/20  0404   WBC 5.9 4.2   HGB 11.7 12.9   HCT 35.7 38.2    211     Recent Labs     06/20/20  0334 06/19/20  0404    138   K 4.3 3.3*    102   CO2 23 31   BUN 16 18   CREA 1.20* 1.26*    130*   CA 9.2 9.6   MG 1.6 1.9   PHOS 3.1 3.6     Recent Labs     06/20/20  0334 06/19/20  0404   ALT 49 52   AP 44* 47   TBILI 0.7 0.4   TP 7.0 7.8   ALB 3.5 3.7   GLOB 3.5 4.1*     Recent Labs     06/19/20  0953 06/19/20  0404   APTT 51.3* 22.2      No results for input(s): FE, TIBC, PSAT, FERR in the last 72 hours. No results for input(s): PH, PCO2, PO2 in the last 72 hours. No results for input(s): CPK, CKMB in the last 72 hours. No lab exists for component: TROPONINI  No results found for: Sarkis 57:   1. Chest pain/NSTEMI (non-ST elevated myocardial infarction): s/p cardiac cath and 2 stent placement. Started on Eliquis/Brilinta/ASA 81mg for 1 month. Thereafter Eliquis/Brilinta ( or Eliquis/Plavix). FU with cardiology. Cont metoprolol     2.   Atrial fibrillation with rapid ventricular response, new/ SVT: now converted. continue eliquis, ASA and metoprolol. 30-day monitor as an outpatient will be arranged by cardiology.     3.   Hypothyroid:  TSH 2.32. resume synthroid      4.   HTN: continue metoprolol         Discharged in improved condition.     Spent 35 minutes    Signed:  Collin Arevalo MD  6/20/2020  1:34 PM no

## 2020-06-20 NOTE — PROGRESS NOTES
SUBJECTIVE      S/p PCI yesterday. Telemetry showed narrow complex SVT (see strip below). On DAPT.        ACTIVE PROBLEM LIST     Patient Active Problem List    Diagnosis Date Noted    [facility]non-ST elevated [facility]) [facility] 06/19/2020    Atrial fibrillation with rapid ventricular response ([facility] 06/19/2020    Hypothyroid 06/19/2020          MEDICATIONS     Current Facility-Administered Medications   Medication Dose Route Frequency    clonazePAM (KlonoPIN) tablet 0.5 mg  0.5 mg Oral QHS    levothyroxine (SYNTHROID) tablet 125 mcg  125 mcg Oral ACB    liothyronine (CYTOMEL) tablet 2.5 mcg  2.5 mcg Oral DAILY    nitroglycerin (NITROBID) 2 % ointment 1 Inch  1 Inch Topical BID PRN    0.9% sodium chloride with KCl 20 mEq/L infusion   IntraVENous CONTINUOUS    sodium chloride (NS) flush 5-40 mL  5-40 mL IntraVENous Q8H    sodium chloride (NS) flush 5-40 mL  5-40 mL IntraVENous PRN    acetaminophen (TYLENOL) tablet 650 mg  650 mg Oral Q4H PRN    HYDROcodone-acetaminophen (NORCO) 5-325 mg per tablet 1 Tab  1 Tab Oral Q4H PRN    naloxone (NARCAN) injection 0.4 mg  0.4 mg IntraVENous PRN    diphenhydrAMINE (BENADRYL) injection 12.5 mg  12.5 mg IntraVENous Q4H PRN    ondansetron (ZOFRAN) injection 4 mg  4 mg IntraVENous Q6H PRN    docusate sodium (COLACE) capsule 100 mg  100 mg Oral BID    morphine injection 2 mg  2 mg IntraVENous Q4H PRN    metoprolol tartrate (LOPRESSOR) tablet 25 mg  25 mg Oral Q12H    aspirin chewable tablet 81 mg  81 mg Oral DAILY    rosuvastatin (CRESTOR) tablet 5 mg  5 mg Oral Q MON, WED & FRI    sodium chloride (NS) flush 5-40 mL  5-40 mL IntraVENous Q8H    sodium chloride (NS) flush 5-40 mL  5-40 mL IntraVENous PRN    sodium chloride (NS) flush 5-40 mL  5-40 mL IntraVENous Q8H    sodium chloride (NS) flush 5-40 mL  5-40 mL IntraVENous PRN    ticagrelor (BRILINTA) tablet 90 mg  90 mg Oral Q12H    apixaban (ELIQUIS) tablet 5 mg  5 mg Oral BID          PAST MEDICAL HISTORY     Past Medical History:   Diagnosis Date    Dyslipidemia     Hypertension     Hypothyroid            PAST SURGICAL HISTORY     No past surgical history on file.        ALLERGIES     Allergies   Allergen Reactions    Neosporin Plus [Zvnrx-Zwozh-Fdiurho-Pramoxine] Rash    Pcn [Penicillins] Rash     Childhood rash          FAMILY HISTORY     Family History   Problem Relation Age of Onset    Heart Disease Mother     Heart Disease Father     negative for cardiac disease     SOCIAL HISTORY     Social History     Socioeconomic History    Marital status:      Spouse name: Not on file    Number of children: Not on file    Years of education: Not on file    Highest education level: Not on file   Tobacco Use    Smoking status: Never Smoker   Substance and Sexual Activity    Alcohol use: Not Currently       MEDICATIONS     Current Facility-Administered Medications   Medication Dose Route Frequency    clonazePAM (KlonoPIN) tablet 0.5 mg  0.5 mg Oral QHS    levothyroxine (SYNTHROID) tablet 125 mcg  125 mcg Oral ACB    liothyronine (CYTOMEL) tablet 2.5 mcg  2.5 mcg Oral DAILY    nitroglycerin (NITROBID) 2 % ointment 1 Inch  1 Inch Topical BID PRN    0.9% sodium chloride with KCl 20 mEq/L infusion   IntraVENous CONTINUOUS    sodium chloride (NS) flush 5-40 mL  5-40 mL IntraVENous Q8H    sodium chloride (NS) flush 5-40 mL  5-40 mL IntraVENous PRN    acetaminophen (TYLENOL) tablet 650 mg  650 mg Oral Q4H PRN    HYDROcodone-acetaminophen (NORCO) 5-325 mg per tablet 1 Tab  1 Tab Oral Q4H PRN    naloxone (NARCAN) injection 0.4 mg  0.4 mg IntraVENous PRN    diphenhydrAMINE (BENADRYL) injection 12.5 mg  12.5 mg IntraVENous Q4H PRN    ondansetron (ZOFRAN) injection 4 mg  4 mg IntraVENous Q6H PRN    docusate sodium (COLACE) capsule 100 mg  100 mg Oral BID    morphine injection 2 mg  2 mg IntraVENous Q4H PRN    metoprolol tartrate (LOPRESSOR) tablet 25 mg  25 mg Oral Q12H    aspirin chewable tablet 81 mg  81 mg Oral DAILY    rosuvastatin (CRESTOR) tablet 5 mg  5 mg Oral Q MON, WED & FRI    sodium chloride (NS) flush 5-40 mL  5-40 mL IntraVENous Q8H    sodium chloride (NS) flush 5-40 mL  5-40 mL IntraVENous PRN    sodium chloride (NS) flush 5-40 mL  5-40 mL IntraVENous Q8H    sodium chloride (NS) flush 5-40 mL  5-40 mL IntraVENous PRN    ticagrelor (BRILINTA) tablet 90 mg  90 mg Oral Q12H    apixaban (ELIQUIS) tablet 5 mg  5 mg Oral BID       I have reviewed the nurses notes, vitals, problem list, allergy list, medical history, family, social history and medications. REVIEW OF SYMPTOMS      General: Pt denies excessive weight gain or loss. Pt is able to conduct ADL's  HEENT: Denies blurred vision, headaches, hearing loss, epistaxis and difficulty swallowing. Respiratory: Denies cough, congestion, shortness of breath, FAULKNER, wheezing or stridor. Cardiovascular: Denies precordial pain, palpitations, edema or PND  Gastrointestinal: Denies poor appetite, indigestion, abdominal pain or blood in stool  Genitourinary: Denies hematuria, dysuria, increased urinary frequency  Musculoskeletal: Denies joint pain or swelling from muscles or joints  Neurologic: Denies tremor, paresthesias, headache, or sensory motor disturbance  Psychiatric: Denies confusion, insomnia, depression  Integumentray: Denies rash, itching or ulcers. Hematologic: Denies easy bruising, bleeding       PHYSICAL EXAMINATION      Vitals:    06/20/20 0713 06/20/20 0757 06/20/20 0800 06/20/20 1129   BP:  117/57 117/57 129/49   Pulse: 67 68 76 67   Resp:  16 26 18   Temp:  97.6 °F (36.4 °C)  98.7 °F (37.1 °C)   SpO2:  98% 97% 98%   Weight:       Height:         General: Well developed, in no acute distress. HEENT: No jaundice, oral mucosa moist, no oral ulcers  Neck: Supple, no stiffness, no lymphadenopathy, supple  Heart:  Normal S1/S2 negative S3 or S4.  Regular, no murmur, gallop or rub, no jugular venous distention  Respiratory: Clear bilaterally x 4, no wheezing or rales  Abdomen:   Soft, non-tender, bowel sounds are active.   Extremities:  No edema, normal cap refill, no cyanosis. Musculoskeletal: No clubbing, no deformities  Neuro: A&Ox3, speech clear, gait stable, cooperative, no focal neurologic deficits  Skin: Skin color is normal. No rashes or lesions. Non diaphoretic, moist.  Vascular: 2+ pulses symmetric in all extremities       DIAGNOSTIC DATA                           LABORATORY DATA      Lab Results   Component Value Date/Time    WBC 5.9 06/20/2020 03:34 AM    HGB 11.7 06/20/2020 03:34 AM    HCT 35.7 06/20/2020 03:34 AM    PLATELET 892 87/85/1766 03:34 AM    MCV 93.9 06/20/2020 03:34 AM      Lab Results   Component Value Date/Time    Sodium 138 06/20/2020 03:34 AM    Potassium 4.3 06/20/2020 03:34 AM    Chloride 106 06/20/2020 03:34 AM    CO2 23 06/20/2020 03:34 AM    Anion gap 9 06/20/2020 03:34 AM    Glucose 100 06/20/2020 03:34 AM    BUN 16 06/20/2020 03:34 AM    Creatinine 1.20 (H) 06/20/2020 03:34 AM    BUN/Creatinine ratio 13 06/20/2020 03:34 AM    GFR est AA 54 (L) 06/20/2020 03:34 AM    GFR est non-AA 44 (L) 06/20/2020 03:34 AM    Calcium 9.2 06/20/2020 03:34 AM    Bilirubin, total 0.7 06/20/2020 03:34 AM    Alk. phosphatase 44 (L) 06/20/2020 03:34 AM    Protein, total 7.0 06/20/2020 03:34 AM    Albumin 3.5 06/20/2020 03:34 AM    Globulin 3.5 06/20/2020 03:34 AM    A-G Ratio 1.0 (L) 06/20/2020 03:34 AM    ALT (SGPT) 49 06/20/2020 03:34 AM           ASSESSMENT      1) NSTEMI s/p PCI; continue DAPT     2) AF with RVR - start eliquis     3) HTN - continue drug therapy     4) Dyslipidemia - agrees to try crestor 5 mg three days a week      5) Supraventricular tachycardia - drug therapy; consider SVT ablation in future if necessary; unclear whether patient is symptomatic. Will arrange for 30-day monitor as an outpatient.           Maye Duckworth MD  Cardiac Electrophysiology / Cardiology    [facility] [address] [place] [address], [address], Arleen Ridley [address] [place] [address], [facility]  [phone] / [phone] Fax   [phone] / [phone] Fax

## 2020-06-20 NOTE — PROGRESS NOTES
0730-Bedside and Verbal shift change report given to Elo Calloway RN (oncoming nurse) by Jalyn White RN (offgoing nurse). Report included the following information SBAR, Kardex, ED Summary, OR Summary, Procedure Summary, Intake/Output, MAR, Recent Results, Med Rec Status and Cardiac Rhythm NSR. I have reviewed discharge instructions with the patient and spouse. The patient and spouse verbalized understanding.

## 2020-06-20 NOTE — DISCHARGE INSTRUCTIONS
Patient Education     Patient Discharge Instructions    Ivan Jessica / [id number] : 1950    Admitted 2020 Discharged: 2020     Take Home Medications   ACUTE DIAGNOSES:  NSTEMI (non-ST elevated myocardial infarction) ([facility] [I21.4]    CHRONIC MEDICAL DIAGNOSES:  Problem List as of 2020 Date Reviewed: 2020          Codes Class Noted - Resolved    * (Principal) NSTEMI (non-ST elevated myocardial infarction) ([facility] ICD-10-CM: I21.4  ICD-9-CM: 410.70  2020 - Present        Atrial fibrillation with rapid ventricular response ([facility] ICD-10-CM: I48.91  ICD-9-CM: 427.31  2020 - Present        Hypothyroid ICD-10-CM: E03.9  ICD-9-CM: 244.9  2020 - Present              DISCHARGE MEDICATIONS:          · It is important that you take the medication exactly as they are prescribed. · Keep your medication in the bottles provided by the pharmacist and keep a list of the medication names, dosages, and times to be taken in your wallet. · Do not take other medications without consulting your doctor. DIET:  Cardiac Diet    ACTIVITY: Activity as tolerated    ADDITIONAL INFORMATION: If you experience any of the following symptoms then please call your primary care physician or return to the emergency room if you cannot get hold of your doctor: Fever, chills, nausea, vomiting, diarrhea, change in mentation, falling, bleeding, shortness of breath. FOLLOW UP CARE:  Dr. Baylee Lafleur MD  you are to call and set up an appointment to see them in 2 weeks. Follow-up with cardiology      Information obtained by :  I understand that if any problems occur once I am at home I am to contact my physician. I understand and acknowledge receipt of the instructions indicated above.                                                                                                                                            Physician's or R.N.'s Signature Date/Time                                                                                                                                              Patient or Representative Signature                                                          Date/Time        · It is important that you take the medication exactly as they are prescribed. · Keep your medication in the bottles provided by the pharmacist and keep a list of the medication names, dosages, and times to be taken in your wallet. · Do not take other medications without consulting your doctor. BRING ALL OF YOUR MEDICINES TO YOUR OFFICE VISIT with Dr. Efren Castillo        Cardiac Catheterization  Discharge Instructions     Do not drive, operate any machinery, or sign any legal documents for 24 hours after your procedure. You must have someone to drive you home.  You may take a shower 24 hours after your cardiac catheterization. Be sure to get the dressing wet and then remove it; gently wash the area with warm soapy water. Pat dry and leave open to air. To help prevent infections, be sure to keep the cath site clean and dry. No lotions, creams, powders, ointments, etc. in the cath site for approximately 1 week.  Do not take a tub bath, get in a hot tub or swimming pool for approximately 5 days or until the cath site is completely healed.  No strenuous activity or heavy lifting over 10 lbs. for 7 days.  Drink plenty of fluids for 24-48 hours after your cath to flush the contrast dye from your kidneys. No alcoholic beverages for 24 hours. You may resume your previous diet (low fat, low cholesterol) after your cath.  After your cath, some bruising or discomfort is common during the healing process. Tylenol, 1-2 tablets every 6 hours as needed, is recommended if you experience any discomfort.   If you experience any signs or symptoms of infection such as fever, chills, or poorly healing incision, persistent tenderness or swelling in the groin, redness and/or warmth to the touch, numbness, significant tingling or pain at the groin site or affected extremity, rash, drainage from the cath site, or if the leg feels tight or swollen, call your physician right away.  If bleeding at the cath site occurs, take a clean gauze pad and apply direct pressure to the groin just above the puncture site. Call 911 immediately, and continue to apply direct pressure until an ambulance gets to your location.  You may return to work  2  days after your cardiac cath if no groin bleeding. Information obtained by :  I understand that if any problems occur once I am at home I am to contact my physician. I understand and acknowledge receipt of the instructions indicated above. R.N.'s Signature                                                                  Date/Time                                                                                                                                              Patient or Representative Signature                                                          Date/Time      Luis Villalobos NP      Radial Cardiac Catheterization/Angiography Discharge Instructions   It is normal to feel tired the first couple days. Take it easy and follow the physicians instructions. CHECK THE CATHETER INSERTION SITE DAILY:   Remove the wrist dressing 24 hours after the procedure. You may shower 24 hours after the procedure. Wash with soap and water and pat dry. Gentle cleaning of the site with soap and water is sufficient, cover with a dry clean dressing or bandage. Do not apply creams or powders to the area. No soaking the wrist for 3 days. Leave the puncture site open to air after 24 hours post-procedure.    CALL THE PHYSICIANS:   If the site becomes red, swollen or feels warm to the touch   If there is bleeding or drainage or if there is unusual pain at the radial site. If there is any minor oozing, you may apply a band-aid and remove after 12 hours. If the bleeding continues, hold pressure with the middle finger against the puncture site and the thumb against the back of the wrist,call 911 to be transported to the hospital.   DO NOT DRIVE YOURSELF, [address]. ACTIVITY:   For the first 24 hours do not manipulate the wrist.   No lifting, pushing or pulling over 3-5 pounds with the affected wrist for 7 daysand no straining the insertion site. Do not life grocery bags or the garbage can, do not run the vacuum  or  for 7 days. Start with short walks as in the hospital and gradually increase as tolerated each day. It is recommended to walk 30 minutes 5-7 days per week. Follow your physicians instructions on activity. Avoid walking outside in extremes of heat or cold. Walk inside when it is cold and windy or hot and humid. Things to keep in mind:   No driving for at least 24 hours, or as designated by your physician. Limit the number of times you go up and down the stairs   Take rests and pace yourself with activity. Be careful and do not strain with bowel movements. MEDICATIONS:   Take all medications as prescribed   Call your physician if you have any questions   Keep an updated list of your medications with you at all times and give a list to your physician and pharmacist   SIGNS AND SYMPTOMS:   Be cautious of symptoms of angina or recurrent symptoms such as chest discomfort, unusual shortness of breath or fatigue. These could be symptoms of restenosis, a new blockage or a heart attack. If your symptoms are relieved with rest it is still recommended that you notify your physician of recurrent chest pain or discomfort.    For CHEST PAIN or symptoms of angina not relieved with rest: If the discomfort is not relieved with rest, and you have been prescribed Nitroglycerin, take as directed (taken under the tongue, one at a time 5 minutes apart for a total of 3 doses). If the discomfort is not relieved after the 3rd nitroglycerin, call 911. If you have not been prescribed Nitroglycerin and your chest discomfort is not relieved with rest, call 911. AFTER CARE:   Follow up with your physician as instructed. Follow a heart healthy diet with proper portion control, daily stress management, daily exercise, blood pressure and cholesterol control , and smoking cessation. Heart Attack: Care Instructions  Your Care Instructions     A heart attack (myocardial infarction, or MI) occurs when one or more of the coronary arteries, which supply the heart with oxygen-rich blood, is blocked. A blockage usually occurs when plaque inside the artery breaks open and a blood clot forms in the artery. After a heart attack, you may be worried about your future. Over the next several weeks, your heart will start to heal. Though it can be hard to break old habits, you can prevent another heart attack by making some lifestyle changes and by taking medicines. You may use this information for ideas about what to do at home to speed your recovery. Follow-up care is a key part of your treatment and safety. Be sure to make and go to all appointments, and call your doctor if you are having problems. It's also a good idea to know your test results and keep a list of the medicines you take. How can you care for yourself at home? Activity  · Until your doctor says it is okay, do not do strenuous exercise. And do not lift, pull, or push anything heavy. Ask your doctor what types of activities are safe for you. · If your doctor has not set you up with a cardiac rehabilitation (rehab) program, talk to him or her about whether that is right for you. Cardiac rehab includes supervised exercise.  It also includes help with diet and lifestyle changes and emotional support. It may reduce your risk of future heart problems. · Increase your activities slowly. Take short rest breaks when you get tired. · If your doctor recommends it, get more exercise. Walking is a good choice. Bit by bit, increase the amount you walk every day. Try for at least 30 minutes on most days of the week. You also may want to swim, bike, or do other activities. Talk with your doctor before you start an exercise program to make sure it is safe for you. · Ask your doctor when you can drive, go back to work, and do other daily activities again. · You can have sex as soon as you feel ready for it. Often this means when you can easily walk around or climb stairs. Talk with your doctor if you have any concerns. If you are taking nitroglycerin, do not take erection-enhancing medicine such as sildenafil (Viagra), tadalafil (Cialis), or vardenafil (Levitra) . Lifestyle changes  · Do not smoke. Smoking increases your risk of another heart attack. If you need help quitting, talk to your doctor about stop-smoking programs and medicines. These can increase your chances of quitting for good. · Eat a heart-healthy diet that is low in saturated fat and salt, and is full of fruits, vegetables and whole-grains. You may get more details about how to eat healthy. But these tips can help you get started. · Stay at a healthy weight, or lose weight if you need to. Medicines  · Be safe with medicines. Take your medicines exactly as prescribed. Call your doctor if you think you are having a problem with your medicine. You will get more details on the specific medicines your doctor prescribes. Do not stop taking your medicine unless your doctor tells you to. Not taking your medicine might raise your risk of having another heart attack. · You may need several medicines to help lower your risk of another heart attack. These include:  ?  Blood pressure medicines such as angiotensin-converting enzyme (ACE) inhibitors, ARBs (angiotensin II receptor blockers), and beta-blockers. ? Cholesterol medicine called statins. ? Aspirin and other blood thinners. These prevent blood clots that can cause a heart attack. · If your doctor has given you nitroglycerin, keep it with you at all times. If you have angina symptoms, such as chest pain or pressure, sit down and rest. Take the first dose of nitroglycerin as directed. If symptoms get worse or are not getting better within 5 minutes, call 911 right away. Stay on the phone. The emergency  will tell you what to do. · Do not take any over-the-counter medicines, vitamins, or herbal products without talking to your doctor first.  Staying healthy  · Manage other health conditions such as high blood pressure and diabetes. · Avoid colds and flu. Get a pneumococcal vaccine shot. If you have had one before, ask your doctor whether you need another dose. Get the flu vaccine every year. If you must be around people with colds or flu, wash your hands often. · Be sure to tell your doctor about any angina symptoms you have had, even if they went away. Pay attention to your angina symptoms. Know what is typical for you and learn how to control it. Know when to call for help. · Talk to your family, friends, or a counselor about your feelings. It is normal to feel frightened, angry, hopeless, helpless, and even guilty. Talking openly about bad feelings can help you cope. If you have symptoms of depression, talk to your doctor. When should you call for help? [id number] anytime you think you may need emergency care. For example, call if:  · You have symptoms of a heart attack. These may include:  ? Chest pain or pressure, or a strange feeling in the chest.  ? Sweating. ? Shortness of breath. ? Nausea or vomiting. ? Pain, pressure, or a strange feeling in the back, neck, jaw, or upper belly or in one or both shoulders or arms. ? Lightheadedness or sudden weakness.   ? A fast or irregular heartbeat. After you call 911, the  may tell you to chew 1 adult-strength or 2 to 4 low-dose aspirin. Wait for an ambulance. Do not try to drive yourself. · You have angina symptoms (such as chest pain or pressure) that do not go away with rest or are not getting better within 5 minutes after you take a dose of nitroglycerin. · You passed out (lost consciousness). · You feel like you are having another heart attack. Call your doctor now or seek immediate medical care if:  · You are having angina symptoms, such as chest pain or pressure, more often than usual, or the symptoms are different or worse than usual.  · You have new or increased shortness of breath. · You are dizzy or lightheaded, or you feel like you may faint. Watch closely for changes in your health, and be sure to contact your doctor if you have any problems. Where can you learn more? Go to http://rodgerbidu.com.brmariel.info/  Enter H564 in the search box to learn more about \"Heart Attack: Care Instructions. \"  Current as of: December 16, 2019               Content Version: 12.5  © 2162-6119 [employer]. Care instructions adapted under license by [employer] (which disclaims liability or warranty for this information). If you have questions about a medical condition or this instruction, always ask your healthcare professional. [place] [address] any warranty or liability for your use of this information. Patient Education        Atrial Fibrillation: Care Instructions  Your Care Instructions     Atrial fibrillation is an irregular and often fast heartbeat. Treating this condition is important for several reasons. It can cause blood clots, which can travel from your heart to your brain and cause a stroke. If you have a fast heartbeat, you may feel lightheaded, dizzy, and weak. An irregular heartbeat can also increase your risk for heart failure.   Atrial fibrillation is often the result of another heart condition, such as high blood pressure or coronary artery disease. Making changes to improve your heart condition will help you stay healthy and active. Follow-up care is a key part of your treatment and safety. Be sure to make and go to all appointments, and call your doctor if you are having problems. It's also a good idea to know your test results and keep a list of the medicines you take. How can you care for yourself at home? Medicines  · Take your medicines exactly as prescribed. Call your doctor if you think you are having a problem with your medicine. You will get more details on the specific medicines your doctor prescribes. · If your doctor has given you a blood thinner to prevent a stroke, be sure you get instructions about how to take your medicine safely. Blood thinners can cause serious bleeding problems. · Do not take any vitamins, over-the-counter drugs, or herbal products without talking to your doctor first.  Lifestyle changes  · Do not smoke. Smoking can increase your chance of a stroke and heart attack. If you need help quitting, talk to your doctor about stop-smoking programs and medicines. These can increase your chances of quitting for good. · Eat a heart-healthy diet. · Stay at a healthy weight. Lose weight if you need to. · Limit alcohol to 2 drinks a day for men and 1 drink a day for women. Too much alcohol can cause health problems. · Avoid colds and flu. Get a pneumococcal vaccine shot. If you have had one before, ask your doctor whether you need another dose. Get a flu shot every year. If you must be around people with colds or flu, wash your hands often. Activity  · If your doctor recommends it, get more exercise. Walking is a good choice. Bit by bit, increase the amount you walk every day. Try for at least 30 minutes on most days of the week. You also may want to swim, bike, or do other activities.  Your doctor may suggest that you join a cardiac rehabilitation program so that you can have help increasing your physical activity safely. · Start light exercise if your doctor says it is okay. Even a small amount will help you get stronger, have more energy, and manage stress. Walking is an easy way to get exercise. Start out by walking a little more than you did in the hospital. Gradually increase the amount you walk. · When you exercise, watch for signs that your heart is working too hard. You are pushing too hard if you cannot talk while you are exercising. If you become short of breath or dizzy or have chest pain, sit down and rest immediately. · Check your pulse regularly. Place two fingers on the artery at the palm side of your wrist, in line with your thumb. If your heartbeat seems uneven or fast, talk to your doctor. When should you call for help? [place] [address] anytime you think you may need emergency care. For example, call if:  · You have symptoms of a heart attack. These may include:  ? Chest pain or pressure, or a strange feeling in the chest.  ? Sweating. ? Shortness of breath. ? Nausea or vomiting. ? Pain, pressure, or a strange feeling in the back, neck, jaw, or upper belly or in one or both shoulders or arms. ? Lightheadedness or sudden weakness. ? A fast or irregular heartbeat. After you call 911, the  may tell you to chew 1 adult-strength or 2 to 4 low-dose aspirin. Wait for an ambulance. Do not try to drive yourself. · You have symptoms of a stroke. These may include:  ? Sudden numbness, tingling, weakness, or loss of movement in your face, arm, or leg, especially on only one side of your body. ? Sudden vision changes. ? Sudden trouble speaking. ? Sudden confusion or trouble understanding simple statements. ? Sudden problems with walking or balance. ? A sudden, severe headache that is different from past headaches. · You passed out (lost consciousness).   Call your doctor now or seek immediate medical care if:  · You have new or increased shortness of breath. · You feel dizzy or lightheaded, or you feel like you may faint. · Your heart rate becomes irregular. · You can feel your heart flutter in your chest or skip heartbeats. Tell your doctor if these symptoms are new or worse. Watch closely for changes in your health, and be sure to contact your doctor if you have any problems. Where can you learn more? Go to http://rodger-mariel.info/  Enter U020 in the search box to learn more about \"Atrial Fibrillation: Care Instructions. \"  Current as of: December 16, 2019               Content Version: 12.5  © 4530-5910 [employer], [employer]. Care instructions adapted under license by [employer] (which disclaims liability or warranty for this information). If you have questions about a medical condition or this instruction, always ask your healthcare professional. [address] any warranty or liability for your use of this information.

## 2020-06-20 NOTE — PROGRESS NOTES
Bedside and Verbal shift change report given to Nikhil Prince (oncoming nurse) by Aliza Gomez (offgoing nurse). Report included the following information SBAR, Kardex, ED Summary, Procedure Summary, Intake/Output, Recent Results, Med Rec Status and Cardiac Rhythm NSR.     2013:  Pt alert, oriented, denies pain. Pt cath sites in rt groin and rt wrist, clean, dry, intact, no new bleeding noted at groin site. Bedside and Verbal shift change report given to Aliza Gomez RN (oncoming nurse) by Nikhil Prince (offgoing nurse). Report included the following information SBAR, Kardex, ED Summary, Procedure Summary, Intake/Output, Recent Results, Med Rec Status and Cardiac Rhythm NSR.

## 2020-06-20 NOTE — PROGRESS NOTES
Von Castillo jonathan Augusta 77  [address] [facility], [address] [place] [address]  [phone]      Medical Progress Note      NAME: Juana Mccoy   :  1950  MRM:  [id number]    Date of service: 2020  7:42 AM       Assessment and Plan:   1. Chest pain/NSTEMI (non-ST elevated myocardial infarction): s/p cardiac cath and 2 stent placement. Started on Eliquis/Brilinta/ASA 81mg for 1 month. Thereafter Eliquis/Brilinta ( or Eliquis/Plavix). FU with cardiology. Cont metoprolol     2. Atrial fibrillation with rapid ventricular response, new/ SVT: now converted. continue eliquis, ASA and metoprolol. 30-day monitor as an outpatient will be arranged by cardiology.     3. Hypothyroid:  TSH 2.32. resume synthroid      4. HTN: continue metoprolol          Subjective:     Chief Complaint[de-identified] Patient was seen and examined as a follow up for NSTEMI. Chart was reviewed. feels well. Wants to go home     ROS:  (bold if positive, if negative)    Tolerating PT  Tolerating Diet        Objective:     Last 24hrs VS reviewed since prior progress note.  Most recent are:    Visit Vitals  /49   Pulse 73   Temp 98.6 °F (37 °C)   Resp 17   Ht 5' 4\" (1.626 m)   Wt 81 kg (178 lb 9.2 oz)   SpO2 93%   BMI 30.65 kg/m²     SpO2 Readings from Last 6 Encounters:   20 93%        No intake or output data in the 24 hours ending 20 0742     Physical Exam:    Gen:  Well-developed, well-nourished, in no acute distress  HEENT:  Pink conjunctivae, PERRL, hearing intact to voice, moist mucous membranes  Neck:  Supple, without masses, thyroid non-tender  Resp:  No accessory muscle use, clear breath sounds without wheezes rales or rhonchi  Card:  No murmurs, normal S1, S2 without thrills, bruits or peripheral edema  Abd:  Soft, non-tender, non-distended, normoactive bowel sounds are present, no palpable organomegaly and no detectable hernias  Lymph:  No cervical or inguinal adenopathy  Musc:  No cyanosis or clubbing  Skin: No rashes or ulcers, skin turgor is good  Neuro:  Cranial nerves are grossly intact, no focal motor weakness, follows commands appropriately  Psych:  Good insight, oriented to person, place and time, alert  __________________________________________________________________  Medications Reviewed: (see below)  Medications:     Current Facility-Administered Medications   Medication Dose Route Frequency    nitroglycerin (NITROBID) 2 % ointment 1 Inch  1 Inch Topical BID PRN    0.9% sodium chloride with KCl 20 mEq/L infusion   IntraVENous CONTINUOUS    sodium chloride (NS) flush 5-40 mL  5-40 mL IntraVENous Q8H    sodium chloride (NS) flush 5-40 mL  5-40 mL IntraVENous PRN    acetaminophen (TYLENOL) tablet 650 mg  650 mg Oral Q4H PRN    HYDROcodone-acetaminophen (NORCO) 5-325 mg per tablet 1 Tab  1 Tab Oral Q4H PRN    naloxone (NARCAN) injection 0.4 mg  0.4 mg IntraVENous PRN    diphenhydrAMINE (BENADRYL) injection 12.5 mg  12.5 mg IntraVENous Q4H PRN    ondansetron (ZOFRAN) injection 4 mg  4 mg IntraVENous Q6H PRN    docusate sodium (COLACE) capsule 100 mg  100 mg Oral BID    morphine injection 2 mg  2 mg IntraVENous Q4H PRN    metoprolol tartrate (LOPRESSOR) tablet 25 mg  25 mg Oral Q12H    aspirin chewable tablet 81 mg  81 mg Oral DAILY    rosuvastatin (CRESTOR) tablet 5 mg  5 mg Oral Q MON, WED & FRI    sodium chloride (NS) flush 5-40 mL  5-40 mL IntraVENous Q8H    sodium chloride (NS) flush 5-40 mL  5-40 mL IntraVENous PRN    sodium chloride (NS) flush 5-40 mL  5-40 mL IntraVENous Q8H    sodium chloride (NS) flush 5-40 mL  5-40 mL IntraVENous PRN    ticagrelor (BRILINTA) tablet 90 mg  90 mg Oral Q12H    apixaban (ELIQUIS) tablet 5 mg  5 mg Oral BID        Lab Data Reviewed: (see below)  Lab Review:     Recent Labs     06/20/20  0334 06/19/20  0404   WBC 5.9 4.2   HGB 11.7 12.9   HCT 35.7 38.2    211     Recent Labs     06/20/20  0334 06/19/20  0404    138   K 4.3 3.3*    102 CO2 23 31    130*   BUN 16 18   CREA 1.20* 1.26*   CA 9.2 9.6   MG 1.6 1.9   PHOS 3.1 3.6   ALB 3.5 3.7   TBILI 0.7 0.4   ALT 49 52     No results found for: GLUCPOC  No results for input(s): PH, PCO2, PO2, HCO3, FIO2 in the last 72 hours. No results for input(s): INR, INREXT in the last 72 hours. All Micro Results     None          I have reviewed notes of prior 24hr. Other pertinent lab:      Total time spent with patient: [address] discussed with: Patient, Nursing Staff and >50% of time spent in counseling and coordination of care    Discussed:  Care Plan    Prophylaxis:  SCD's    Disposition:  Home w/Family           ___________________________________________________    Attending Physician: Justin Costa MD

## 2020-06-21 LAB — ACT BLD: 362 SECS (ref 79–138)

## 2020-06-22 LAB
ATRIAL RATE: 150 BPM
ATRIAL RATE: 340 BPM
ATRIAL RATE: 57 BPM
CALCULATED P AXIS, ECG09: 44 DEGREES
CALCULATED R AXIS, ECG10: 0 DEGREES
CALCULATED R AXIS, ECG10: 12 DEGREES
CALCULATED R AXIS, ECG10: 5 DEGREES
CALCULATED T AXIS, ECG11: -118 DEGREES
CALCULATED T AXIS, ECG11: -42 DEGREES
CALCULATED T AXIS, ECG11: 49 DEGREES
DIAGNOSIS, 93000: NORMAL
P-R INTERVAL, ECG05: 170 MS
Q-T INTERVAL, ECG07: 306 MS
Q-T INTERVAL, ECG07: 348 MS
Q-T INTERVAL, ECG07: 432 MS
QRS DURATION, ECG06: 68 MS
QRS DURATION, ECG06: 68 MS
QRS DURATION, ECG06: 76 MS
QTC CALCULATION (BEZET), ECG08: 420 MS
QTC CALCULATION (BEZET), ECG08: 441 MS
QTC CALCULATION (BEZET), ECG08: 450 MS
VENTRICULAR RATE, ECG03: 130 BPM
VENTRICULAR RATE, ECG03: 57 BPM
VENTRICULAR RATE, ECG03: 97 BPM

## 2020-06-26 RX ORDER — METOPROLOL TARTRATE 25 MG/1
25 TABLET, FILM COATED ORAL EVERY 12 HOURS
Qty: 180 TAB | Refills: 0 | Status: SHIPPED | OUTPATIENT
Start: 2020-06-26 | End: 2020-07-20 | Stop reason: SDUPTHER

## 2020-06-26 RX ORDER — ROSUVASTATIN CALCIUM 5 MG/1
5 TABLET, COATED ORAL
Qty: 40 TAB | Refills: 0 | Status: SHIPPED | OUTPATIENT
Start: 2020-06-26 | End: 2020-10-21

## 2020-06-26 NOTE — TELEPHONE ENCOUNTER
Per Dr. Cuate Partida VO:  LOV:Visit date not found  Future Appointments   Date Time Provider Jacqueline Huynh   7/2/2020  8:40 AM [facility]     Requested Prescriptions     Pending Prescriptions Disp Refills    apixaban (ELIQUIS) 5 mg tablet 60 Tab 1     Sig: Take 1 Tab by mouth two (2) times a day.  metoprolol tartrate (LOPRESSOR) 25 mg tablet 60 Tab 1     Sig: Take 1 Tab by mouth every twelve (12) hours.  rosuvastatin (CRESTOR) 5 mg tablet 30 Tab 2     Sig: Take 1 Tab by mouth every Monday, Wednesday, Friday.

## 2020-06-30 NOTE — TELEPHONE ENCOUNTER
Per Dr. Altagracia Bello VO:  LOV:Visit date not found  Future Appointments   Date Time Provider Jacqueline Huynh   7/2/2020  8:40 AM Belkys Carlos MD [facility] DENNIS LOPEZ     Requested Prescriptions     Pending Prescriptions Disp Refills    ticagrelor (BRILINTA) 90 mg tablet 60 Tab 11     Sig: Take 1 Tab by mouth every twelve (12) hours every twelve (12) hours.

## 2020-07-02 ENCOUNTER — OFFICE VISIT (OUTPATIENT)
Dept: CARDIOLOGY CLINIC | Age: 70
End: 2020-07-02

## 2020-07-02 VITALS
DIASTOLIC BLOOD PRESSURE: 84 MMHG | SYSTOLIC BLOOD PRESSURE: 138 MMHG | WEIGHT: 179 LBS | HEIGHT: 64 IN | OXYGEN SATURATION: 96 % | BODY MASS INDEX: 30.56 KG/M2 | HEART RATE: 71 BPM

## 2020-07-02 DIAGNOSIS — I48.0 PAF (PAROXYSMAL ATRIAL FIBRILLATION) (HCC): ICD-10-CM

## 2020-07-02 DIAGNOSIS — I10 ESSENTIAL HYPERTENSION: ICD-10-CM

## 2020-07-02 DIAGNOSIS — E78.5 DYSLIPIDEMIA: ICD-10-CM

## 2020-07-02 DIAGNOSIS — I21.4 NSTEMI (NON-ST ELEVATED MYOCARDIAL INFARCTION) (HCC): Primary | ICD-10-CM

## 2020-07-02 RX ORDER — EZETIMIBE 10 MG/1
10 TABLET ORAL DAILY
Qty: 90 TAB | Refills: 3 | Status: SHIPPED | OUTPATIENT
Start: 2020-07-02 | End: 2021-04-26

## 2020-07-02 NOTE — PROGRESS NOTES
Angelito Santiago MD. [facility]              Patient: Manisha [place]  : 1950      Today's Date: 7/3/2020          HISTORY OF PRESENT ILLNESS:     History of Present Illness:  Here for follow-up  -  Was in hospital for [facility]STEMI and Afib ([facility]). She is doing OK since discharge. She did CPR on  on 20 and he is now being cooled at [facility]. .  She denies CP herself. A little tired. PAST MEDICAL HISTORY:     Past Medical History:   Diagnosis Date    CAD (coronary artery disease)     NSTEMI - PCI to LCX on 20     Dyslipidemia     Hypertension     Hypothyroid     [facility]STEM[facility] (non-ST elevated [facility] infarction) ([facility] [address]     NSTEMI 20    PAF (paroxysmal atrial fibrillation) ([facility] [address]     Arrived with Afib with RVR 20 - converted to NSR spontaneously same day    Statin intolerance            MEDICATIONS:     Current Outpatient Medications   Medication Sig Dispense Refill    ticagrelor (BRILINTA) 90 mg tablet Take 1 Tab by mouth every twelve (12) hours every twelve (12) hours. 180 Tab 0    apixaban (ELIQUIS) 5 mg tablet Take 1 Tab by mouth two (2) times a day. 180 Tab 0    metoprolol tartrate (LOPRESSOR) 25 mg tablet Take 1 Tab by mouth every twelve (12) hours. 180 Tab 0    rosuvastatin (CRESTOR) 5 mg tablet Take 1 Tab by mouth every Monday, Wednesday, Friday. 40 Tab 0    omega 3-DHA-EPA-fish oil 1,000 mg (120 mg-180 mg) capsule Take 3 Caps by mouth daily.  cholecalciferol (VITAMIN D3) (5000 Units/125 mcg) tab tablet Take 5,000 Units by mouth daily.  VITAMIN K2 PO Take 1 Tab by mouth daily.  ascorbic acid, vitamin C, (Vitamin C) 1,000 mg tablet Take 1,000 mg by mouth daily.  clonazePAM (KlonoPIN) 0.5 mg tablet Take 0.5 mg by mouth nightly.  liothyronine (CYTOMEL) 5 mcg tablet Take 2.5 mcg by mouth daily.  levothyroxine (SYNTHROID) 125 mcg tablet Take 125 mcg by mouth Daily (before breakfast).       nitroglycerin (NITROSTAT) 0.4 mg SL tablet 0.4 mg by SubLINGual route every five (5) minutes as needed for Chest Pain. Up to 3 doses.  aspirin 81 mg chewable tablet Take 1 Tab by mouth daily. 1 Tab 1       Allergies   Allergen Reactions    Adhesive Rash    Neosporin Plus [Fmqwb-Dyhki-Qpwwxxa-Pramoxine] Rash    Pcn [Penicillins] Rash     Childhood rash           SOCIAL HISTORY:     Social History     Tobacco Use    Smoking status: Never Smoker    Smokeless tobacco: Never Used   Substance Use Topics    Alcohol use: Not Currently    Drug use: Not on file         FAMILY HISTORY:     Family History   Problem Relation Age of Onset    Heart Disease Mother     Heart Disease Father            REVIEW OF SYMPTOMS:     Review of Symptoms:  Constitutional: Negative for fever, chills  HEENT: Negative for nosebleeds, tinnitus, and vision changes. Respiratory: Negative for cough, wheezing  Cardiovascular: Negative for orthopnea, claudication, syncope, and PND. Gastrointestinal: Negative for abdominal pain, diarrhea, melena. Genitourinary: Negative for dysuria  Musculoskeletal: Negative for myalgias. Skin: Negative for rash  Heme: No problems bleeding. Neurological: Negative for speech change and focal weakness. PHYSICAL EXAM:     Physical Exam:  Visit Vitals  /84 (BP 1 Location: Left arm, BP Patient Position: Sitting)   Pulse 71   Ht 5' 4\" (1.626 m)   Wt 179 lb (81.2 kg)   SpO2 96%   BMI 30.73 kg/m²     Patient appears generally well, mood and affect are appropriate and pleasant. HEENT:  Hearing intact, non-icteric, normocephalic, atraumatic. Neck Exam: Supple, No JVD or carotid bruits. Lung Exam: Clear to auscultation, even breath sounds. Cardiac Exam: Regular rate and rhythm with no murmur or rub  Abdomen: Soft, non-tender, normal bowel sounds. .  Extremities: Moves all ext well. No lower extremity edema.   Psych: Appropriate affect  Neuro - Grossly intact        LABS / OTHER STUDIES:       Lab Results   Component Value Date/Time    Sodium 138 06/20/2020 03:34 AM    Potassium 4.3 06/20/2020 03:34 AM    Chloride 106 06/20/2020 03:34 AM    CO2 23 06/20/2020 03:34 AM    Anion gap 9 06/20/2020 03:34 AM    Glucose 100 06/20/2020 03:34 AM    BUN 16 06/20/2020 03:34 AM    Creatinine 1.20 (H) 06/20/2020 03:34 AM    BUN/Creatinine ratio 13 06/20/2020 03:34 AM    GFR est AA 54 (L) 06/20/2020 03:34 AM    GFR est non-AA 44 (L) 06/20/2020 03:34 AM    Calcium 9.2 06/20/2020 03:34 AM    Bilirubin, total 0.7 06/20/2020 03:34 AM    Alk. phosphatase 44 (L) 06/20/2020 03:34 AM    Protein, total 7.0 06/20/2020 03:34 AM    Albumin 3.5 06/20/2020 03:34 AM    Globulin 3.5 06/20/2020 03:34 AM    A-G Ratio 1.0 (L) 06/20/2020 03:34 AM    ALT (SGPT) 49 06/20/2020 03:34 AM    AST (SGOT) 40 (H) 06/20/2020 03:34 AM     Lab Results   Component Value Date/Time    WBC 5.9 06/20/2020 03:34 AM    HGB 11.7 06/20/2020 03:34 AM    HCT 35.7 06/20/2020 03:34 AM    PLATELET 441 57/22/7152 03:34 AM    MCV 93.9 06/20/2020 03:34 AM       Lab Results   Component Value Date/Time    Cholesterol, total 314 (H) 06/20/2020 03:34 AM    HDL Cholesterol 41 06/20/2020 03:34 AM    LDL, calculated 202.6 (H) 06/20/2020 03:34 AM    VLDL, calculated 70.4 06/20/2020 03:34 AM    Triglyceride 352 (H) 06/20/2020 03:34 AM    CHOL/HDL Ratio 7.7 (H) 06/20/2020 03:34 AM           CARDIAC DIAGNOSTICS:     Cardiac Evaluation Includes:    EKG 6/19/20 - Afib with RVR, , St depression V2-V4   EKG 6/19/20 later in day - sinus cynthia, otherwise normal       Stress test in 2020 at [facility] (Dr. Fadia Saenz) - was OK per patient     Echo 6/19/20 - LVEF 50-55%, inferolateral and anterolateral HK, mild LAE, MAC and mild MR, AV calcification     Cardiac Cath 6/19/20 - LM 30%, LAD 30%, pLCX 100%,  RCA: subselective inj; MLI - not well visualized.   LVEDP: 9   --->  FAM 2.5 by 33 to LCX         ASSESSMENT AND PLAN:      Assessment and Plan:  1) NSTEMI   - presented with CP 6/19/20 and was in afib with RVR  - Cardiac cath 6/19/20 ---> FAM to 100% pLCX stenosis   - She is doing well without further chest pain   - Dr. Lynsey Gilliland recommendation was for \"Eliquis/Brilinta/ASA 81mg for 1 month. Thereafter Eliquis/Brilinta ( or Eliquis/Plavix). \"    - See below regarding statin   - Continue BB    2) PAF  - Arrived with Afib with RVR 6/19/20 in setting of NSTEMI - converted to NSR spontaneously same day  - Continue BB  - Check a 30 day event monitor to see if Afib controlled     3) Dyslipidemia   - LDL was 202 on 6/20/20   - she has been intolerant of multiple statins (muscle pain)   - On 6/20/20 started Crestor 5 mg three days a week. - Add Zetia   - Consider Repetha after rechecking lipids in a few weeks     4) HTN  - BP fair   - continue meds and follow     5) See me in 6 weeks.  had a cardiac arrest 6/30/20 and then went to [facility] (she did CPR).    Rigoberto Underwood MD, [address] [place] [address], [address]      [place] [address].  [address], [facility] [address]                 [address]  Ph: [phone]                               Ph [phone]

## 2020-07-02 NOTE — PROGRESS NOTES
Carli Morales is a 65 y.o. female    Visit Vitals  /84 (BP 1 Location: Left arm, BP Patient Position: Sitting)   Pulse 71   Ht 5' 4\" (1.626 m)   Wt 179 lb (81.2 kg)   SpO2 96%   BMI 30.73 kg/m²       Chief Complaint   Patient presents with   [facility] Follow Up    Other     afib       Chest pain no  SOB no  Dizziness no  Swelling no  Recent hospital visit no  Refills no

## 2020-07-06 ENCOUNTER — TELEPHONE (OUTPATIENT)
Dept: CARDIOLOGY CLINIC | Age: 70
End: 2020-07-06

## 2020-07-06 NOTE — TELEPHONE ENCOUNTER
Spoke with patient as follow up from hospitalization/consult by Dr. Kay Silva. She has since seen Dr. Andrea Pollard and is currently feeling well. Unfortunately, her  has had a cardiac arrest since that time- for which she performed CPR- and is currently admitted at [facility]. She would like to defer monitor for now, but we reviewed potential plan and she will contact us prior to her 9/1/2020 follow up with Dr. Andrea Pollard should she wish to proceed prior to that time.     Paul Lebron, NP

## 2020-07-16 ENCOUNTER — TELEPHONE (OUTPATIENT)
Dept: CARDIOLOGY CLINIC | Age: 70
End: 2020-07-16

## 2020-07-16 NOTE — TELEPHONE ENCOUNTER
Pt states [facility] states her 2 medications (Brilinta, Eliquis) are in conflict. Pt states she should not have been taking the medications for the whole month and Inquiring which one of the 2 should she be taking.  Please advise        DEJA:[phone]

## 2020-07-16 NOTE — TELEPHONE ENCOUNTER
Spoke to pt,  [facility] is not allowing Brilinta and Eliquis to be sent without hearing from the physician's office first.  Let her know I could call and talk to them.  had cardiac arrest and CABG; currently at [facility]. He would like to switch over to Dr. Vince Garcia; seeing Dr. Maryan Ziegler in hospital.      Annis Lundborg, please call pt to schedule her  NP. Samples put together for patient to  at her convenience as [facility] will not mail them in time. Spoke to Daniele, pharmacist, they will release the meds.

## 2020-07-20 RX ORDER — METOPROLOL TARTRATE 25 MG/1
25 TABLET, FILM COATED ORAL EVERY 12 HOURS
Qty: 60 TAB | Refills: 0 | Status: SHIPPED | OUTPATIENT
Start: 2020-07-20 | End: 2020-09-02

## 2020-07-20 NOTE — TELEPHONE ENCOUNTER
Per Dr. Tracey Gilliland VO:  KLE:[id number]  Future Appointments   Date Time Provider Jacqueline Huynh   9/3/2020  2:40 PM Homero Bradshaw MD [facility]NIS LOPEZ     Requested Prescriptions     Signed Prescriptions Disp Refills    metoprolol tartrate (LOPRESSOR) 25 mg tablet 60 Tab 0     Sig: Take 1 Tab by mouth every twelve (12) hours.      Authorizing Provider: Quincy Grande     Ordering User: Vipin Mir

## 2020-07-21 ENCOUNTER — TELEPHONE (OUTPATIENT)
Dept: CARDIOLOGY CLINIC | Age: 70
End: 2020-07-21

## 2020-07-23 ENCOUNTER — CLINICAL SUPPORT (OUTPATIENT)
Dept: CARDIOLOGY CLINIC | Age: 70
End: 2020-07-23

## 2020-07-23 DIAGNOSIS — I48.91 ATRIAL FIBRILLATION, UNSPECIFIED TYPE (HCC): Primary | ICD-10-CM

## 2020-07-23 NOTE — TELEPHONE ENCOUNTER
Called patient, ID verified using two patient identifiers. Informed her that event monitor will be ordered today and sent to her home address. Patient verbalizes understanding and is aware that it  may take up to 7 days to receive the monitor.

## 2020-07-30 RX ORDER — METOPROLOL TARTRATE 25 MG/1
TABLET, FILM COATED ORAL
Qty: 180 TAB | OUTPATIENT
Start: 2020-07-30

## 2020-09-02 RX ORDER — APIXABAN 5 MG/1
TABLET, FILM COATED ORAL
Qty: 180 TAB | Refills: 3 | Status: SHIPPED | OUTPATIENT
Start: 2020-09-02 | End: 2021-08-12

## 2020-09-02 RX ORDER — TICAGRELOR 90 MG/1
TABLET ORAL
Qty: 180 TAB | Refills: 3 | Status: SHIPPED | OUTPATIENT
Start: 2020-09-02 | End: 2021-02-01

## 2020-09-02 RX ORDER — METOPROLOL TARTRATE 25 MG/1
TABLET, FILM COATED ORAL
Qty: 180 TAB | Refills: 3 | Status: SHIPPED | OUTPATIENT
Start: 2020-09-02 | End: 2021-03-31 | Stop reason: SDUPTHER

## 2020-09-03 ENCOUNTER — OFFICE VISIT (OUTPATIENT)
Dept: CARDIOLOGY CLINIC | Age: 70
End: 2020-09-03
Payer: MEDICARE

## 2020-09-03 VITALS
WEIGHT: 174.8 LBS | SYSTOLIC BLOOD PRESSURE: 140 MMHG | DIASTOLIC BLOOD PRESSURE: 82 MMHG | OXYGEN SATURATION: 94 % | BODY MASS INDEX: 30 KG/M2 | HEART RATE: 70 BPM

## 2020-09-03 DIAGNOSIS — I21.4 NSTEMI (NON-ST ELEVATED MYOCARDIAL INFARCTION) (HCC): ICD-10-CM

## 2020-09-03 DIAGNOSIS — I25.118 CORONARY ARTERY DISEASE OF NATIVE HEART WITH STABLE ANGINA PECTORIS, UNSPECIFIED VESSEL OR LESION TYPE (HCC): Primary | ICD-10-CM

## 2020-09-03 DIAGNOSIS — E56.9 VITAMIN DEFICIENCY: ICD-10-CM

## 2020-09-03 DIAGNOSIS — I10 ESSENTIAL HYPERTENSION: ICD-10-CM

## 2020-09-03 DIAGNOSIS — E55.9 VITAMIN D DEFICIENCY: ICD-10-CM

## 2020-09-03 DIAGNOSIS — E78.5 DYSLIPIDEMIA: ICD-10-CM

## 2020-09-03 DIAGNOSIS — M79.10 MUSCLE PAIN: ICD-10-CM

## 2020-09-03 PROCEDURE — 1101F PT FALLS ASSESS-DOCD LE1/YR: CPT | Performed by: SPECIALIST

## 2020-09-03 PROCEDURE — G8400 PT W/DXA NO RESULTS DOC: HCPCS | Performed by: SPECIALIST

## 2020-09-03 PROCEDURE — 1090F PRES/ABSN URINE INCON ASSESS: CPT | Performed by: SPECIALIST

## 2020-09-03 PROCEDURE — 99214 OFFICE O/P EST MOD 30 MIN: CPT | Performed by: SPECIALIST

## 2020-09-03 PROCEDURE — G8536 NO DOC ELDER MAL SCRN: HCPCS | Performed by: SPECIALIST

## 2020-09-03 PROCEDURE — G8432 DEP SCR NOT DOC, RNG: HCPCS | Performed by: SPECIALIST

## 2020-09-03 PROCEDURE — G8427 DOCREV CUR MEDS BY ELIG CLIN: HCPCS | Performed by: SPECIALIST

## 2020-09-03 PROCEDURE — 3017F COLORECTAL CA SCREEN DOC REV: CPT | Performed by: SPECIALIST

## 2020-09-03 PROCEDURE — G8417 CALC BMI ABV UP PARAM F/U: HCPCS | Performed by: SPECIALIST

## 2020-09-03 RX ORDER — LOSARTAN POTASSIUM 25 MG/1
25 TABLET ORAL
Qty: 30 TAB | Refills: 12 | Status: SHIPPED | OUTPATIENT
Start: 2020-09-03 | End: 2020-10-20

## 2020-09-03 NOTE — PROGRESS NOTES
Ben Mendez is a 65 y.o. female    Chief Complaint   Patient presents with    Follow-up       Chest pain No    SOB patient states some SOB     Dizziness No    Swelling No    Refills refill needed     Visit Vitals  /82 (BP 1 Location: Left arm, BP Patient Position: Sitting)   Pulse 70   Wt 174 lb 12.8 oz (79.3 kg)   SpO2 94%   BMI 30.00 kg/m²       1. Have you been to the ER, urgent care clinic since your last visit? Hospitalized since your last visit? [facility] 6/19-6/20    2. Have you seen or consulted any other health care providers outside of the [address] [place] [address] since your last visit? Include any pap smears or colon screening.   No

## 2020-09-03 NOTE — PROGRESS NOTES
Ezra Carter MD. [facility]              Patient: Dayo Carranza  : 1950      Today's Date: 9/3/2020          HISTORY OF PRESENT ILLNESS:     History of Present Illness:  Here for follow-up. She is doing well overall. Has brief SOB spells that resolve with 3 deep breaths and it goes away. No CP. BP is high at home. PAST MEDICAL HISTORY:     Past Medical History:   Diagnosis Date    CAD (coronary artery disease)     NSTEMI - PCI to LCX on 20     Dyslipidemia     Hypertension     Hypothyroid     NSTEMI (non-ST elevated myocardial infarction) ([facility] [address]     NSTEMI 20    PAF (paroxysmal atrial fibrillation) ([facility] [address]     Arrived with Afib with RVR 20 - converted to NSR spontaneously same day    Statin intolerance        Past Surgical History:   Procedure Laterality Date    HX CORONARY STENT PLACEMENT           MEDICATIONS:     Current Outpatient Medications   Medication Sig Dispense Refill    Eliquis 5 mg tablet TAKE 1 TABLET BY MOUTH  TWICE DAILY 180 Tab 3    Brilinta 90 mg tablet TAKE 1 TABLET BY MOUTH  EVERY TWELVE HOURS 180 Tab 3    metoprolol tartrate (LOPRESSOR) 25 mg tablet TAKE 1 TABLET BY MOUTH  EVERY 12 HOURS 180 Tab 3    ezetimibe (ZETIA) 10 mg tablet Take 1 Tab by mouth daily. 90 Tab 3    rosuvastatin (CRESTOR) 5 mg tablet Take 1 Tab by mouth every Monday, Wednesday, Friday. 40 Tab 0    omega 3-DHA-EPA-fish oil 1,000 mg (120 mg-180 mg) capsule Take 3 Caps by mouth daily.  cholecalciferol (VITAMIN D3) (5000 Units/125 mcg) tab tablet Take 5,000 Units by mouth daily.  VITAMIN K2 PO Take 1 Tab by mouth daily.  ascorbic acid, vitamin C, (Vitamin C) 1,000 mg tablet Take 1,000 mg by mouth daily.  clonazePAM (KlonoPIN) 0.5 mg tablet Take 0.5 mg by mouth nightly.  liothyronine (CYTOMEL) 5 mcg tablet Take 2.5 mcg by mouth daily.  levothyroxine (SYNTHROID) 125 mcg tablet Take 125 mcg by mouth Daily (before breakfast).       nitroglycerin (NITROSTAT) 0.4 mg SL tablet 0.4 mg by SubLINGual route every five (5) minutes as needed for Chest Pain. Up to 3 doses. Allergies   Allergen Reactions    Adhesive Rash    Neosporin Plus [Tffvu-Vqplj-Xfsfgpr-Pramoxine] Rash    Pcn [Penicillins] Rash     Childhood rash           SOCIAL HISTORY:     Social History     Tobacco Use    Smoking status: Never Smoker    Smokeless tobacco: Never Used   Substance Use Topics    Alcohol use: Not Currently    Drug use: Not on file         FAMILY HISTORY:     Family History   Problem Relation Age of Onset    Heart Disease Mother     Heart Disease Father             REVIEW OF SYMPTOMS:      Review of Symptoms:  Constitutional: Negative for fever, chills  HEENT: Negative for nosebleeds, tinnitus, and vision changes. Respiratory: Negative for cough, wheezing  Cardiovascular: Negative for orthopnea, claudication, syncope, and PND. Gastrointestinal: Negative for abdominal pain, diarrhea, melena. Genitourinary: Negative for dysuria  Musculoskeletal: Negative for myalgias. Skin: Negative for rash  Heme: No problems bleeding. Neurological: Negative for speech change and focal weakness.            PHYSICAL EXAM:      Physical Exam:  Visit Vitals  /82 (BP 1 Location: Left arm, BP Patient Position: Sitting)   Pulse 70   Wt 174 lb 12.8 oz (79.3 kg)   SpO2 94%   BMI 30.00 kg/m²       Patient appears generally well, mood and affect are appropriate and pleasant. HEENT:  Hearing intact, non-icteric, normocephalic, atraumatic. Neck Exam: Supple, No JVD or carotid bruits. Lung Exam: Clear to auscultation, even breath sounds. Cardiac Exam: Regular rate and rhythm with no murmur or rub  Abdomen: Soft, non-tender, normal bowel sounds. .  Extremities: Moves all ext well. No lower extremity edema.   Psych: Appropriate affect  Neuro - Grossly intact           LABS / OTHER STUDIES:               Lab Results   Component Value Date/Time     Sodium 138 06/20/2020 03:34 AM   Potassium 4.3 06/20/2020 03:34 AM     Chloride 106 06/20/2020 03:34 AM     CO2 23 06/20/2020 03:34 AM     Anion gap 9 06/20/2020 03:34 AM     Glucose 100 06/20/2020 03:34 AM     BUN 16 06/20/2020 03:34 AM     Creatinine 1.20 (H) 06/20/2020 03:34 AM     BUN/Creatinine ratio 13 06/20/2020 03:34 AM     GFR est AA 54 (L) 06/20/2020 03:34 AM     GFR est non-AA 44 (L) 06/20/2020 03:34 AM     Calcium 9.2 06/20/2020 03:34 AM     Bilirubin, total 0.7 06/20/2020 03:34 AM     Alk. phosphatase 44 (L) 06/20/2020 03:34 AM     Protein, total 7.0 06/20/2020 03:34 AM     Albumin 3.5 06/20/2020 03:34 AM     Globulin 3.5 06/20/2020 03:34 AM     A-G Ratio 1.0 (L) 06/20/2020 03:34 AM     ALT (SGPT) 49 06/20/2020 03:34 AM     AST (SGOT) 40 (H) 06/20/2020 03:34 AM           Lab Results   Component Value Date/Time     WBC 5.9 06/20/2020 03:34 AM     HGB 11.7 06/20/2020 03:34 AM     HCT 35.7 06/20/2020 03:34 AM     PLATELET 211 94/48/3283 03:34 AM     MCV 93.9 06/20/2020 03:34 AM              Lab Results   Component Value Date/Time     Cholesterol, total 314 (H) 06/20/2020 03:34 AM     HDL Cholesterol 41 06/20/2020 03:34 AM     LDL, calculated 202.6 (H) 06/20/2020 03:34 AM     VLDL, calculated 70.4 06/20/2020 03:34 AM     Triglyceride 352 (H) 06/20/2020 03:34 AM     CHOL/HDL Ratio 7.7 (H) 06/20/2020 03:34 AM              CARDIAC DIAGNOSTICS:      Cardiac Evaluation Includes:     EKG 6/19/20 - Afib with RVR, , St depression V2-V4   EKG 6/19/20 later in day - sinus cynthia, otherwise normal         Stress test in 2020 at [facility] (Dr. Lamont Valeria) - was OK per patient      Echo 6/19/20 - LVEF 50-55%, inferolateral and anterolateral HK, mild LAE, MAC and mild MR, AV calcification      Cardiac Cath 6/19/20 - LM 30%, LAD 30%, pLCX 100%,  RCA: subselective inj; MLI - not well visualized.   LVEDP: 9   --->  FAM 2.5 by 33 to LCX     Event Monitor 8/1/20 - 8/30 - normal         ASSESSMENT AND PLAN:      Assessment and Plan:  1) NSTEMI   - presented with CP 6/19/20 and was in afib with RVR  - Cardiac cath 6/19/20 ---> FAM to 100% pLCX stenosis   - Dr. Rina Machuca recommendation was for \"Eliquis/Brilinta/ASA 81mg for 1 month. Thereafter Eliquis/Brilinta ( or Eliquis/Plavix). \"    - See below regarding statin   - Continue BB  - She is doing well without further chest pain   - refer to cardiac rehab     2) PAF  - Arrived with Afib with RVR 6/19/20 in setting of NSTEMI - converted to NSR spontaneously same day  - Continue BB  - Event monitor was normal 8/1/20-8/30/20   - Will continue [address] for now --- Since Afib came in a setting of acute MI and then resolved spontaneously and her FU event monitor was normal, will consider stopping [address] eventually (maybe consider ILR in order to do so safely) - will discuss this with her next visit      3) Dyslipidemia   - LDL was 202 on 6/20/20   - she has been intolerant of multiple statins (muscle pain)   - On 6/20/20 started Crestor 5 mg three days a week. - Added Zetia 7/2020  - Consider Repetha after rechecking lipids in a few weeks      4) HTN  - BP high at home ---> will add losartan 25 mg daily and follow at home     5) See me in 4 weeks.  had a cardiac arrest 6/30/20 and then went to [facility] (she did CPR).       Baron Rahel MD, [address] [facility] [place] [address]  [address] [place] [address], [address]      [address] [facility]  [address]  [facility], [address] [place] [address]  Ph: [phone]                                [phone]

## 2020-09-09 LAB
25(OH)D3+25(OH)D2 SERPL-MCNC: 53.4 NG/ML (ref 30–100)
BUN SERPL-MCNC: 20 MG/DL (ref 8–27)
BUN/CREAT SERPL: 16 (ref 12–28)
CALCIUM SERPL-MCNC: 10.5 MG/DL (ref 8.7–10.3)
CHLORIDE SERPL-SCNC: 108 MMOL/L (ref 96–106)
CHOLEST SERPL-MCNC: 238 MG/DL (ref 100–199)
CK SERPL-CCNC: 88 U/L (ref 32–182)
CO2 SERPL-SCNC: 24 MMOL/L (ref 20–29)
CREAT SERPL-MCNC: 1.27 MG/DL (ref 0.57–1)
ERYTHROCYTE [DISTWIDTH] IN BLOOD BY AUTOMATED COUNT: 12.6 % (ref 11.7–15.4)
GLUCOSE SERPL-MCNC: 103 MG/DL (ref 65–99)
HCT VFR BLD AUTO: 43.8 % (ref 34–46.6)
HDL SERPL-SCNC: 39.2 UMOL/L
HDLC SERPL-MCNC: 61 MG/DL
HGB BLD-MCNC: 14.4 G/DL (ref 11.1–15.9)
INTERPRETATION, 910389: NORMAL
INTERPRETATION: NORMAL
LDL SERPL QN: 20.9 NM
LDL SERPL-SCNC: 1769 NMOL/L
LDL SMALL SERPL-SCNC: 907 NMOL/L
LDLC SERPL CALC-MCNC: 135 MG/DL (ref 0–99)
LP-IR SCORE SERPL: 60
MCH RBC QN AUTO: 30.4 PG (ref 26.6–33)
MCHC RBC AUTO-ENTMCNC: 32.9 G/DL (ref 31.5–35.7)
MCV RBC AUTO: 93 FL (ref 79–97)
PDF IMAGE, 910387: NORMAL
PLATELET # BLD AUTO: 210 X10E3/UL (ref 150–450)
POTASSIUM SERPL-SCNC: 4.6 MMOL/L (ref 3.5–5.2)
RBC # BLD AUTO: 4.73 X10E6/UL (ref 3.77–5.28)
SODIUM SERPL-SCNC: 142 MMOL/L (ref 134–144)
TRIGL SERPL-MCNC: 236 MG/DL (ref 0–149)
WBC # BLD AUTO: 3.8 X10E3/UL (ref 3.4–10.8)

## 2020-09-11 ENCOUNTER — TELEPHONE (OUTPATIENT)
Dept: CARDIOLOGY CLINIC | Age: 70
End: 2020-09-11

## 2020-09-11 NOTE — TELEPHONE ENCOUNTER
Patient spouse is inquiring about patients recent test results. Please advise.      Phone: [phone]

## 2020-09-25 ENCOUNTER — HOSPITAL ENCOUNTER (OUTPATIENT)
Dept: CARDIAC REHAB | Age: 70
Discharge: HOME | End: 2020-09-25
Payer: MEDICARE

## 2020-09-25 VITALS — BODY MASS INDEX: 29.73 KG/M2 | HEIGHT: 64 IN | WEIGHT: 174.16 LBS

## 2020-09-25 PROCEDURE — 93798 PHYS/QHP OP CAR RHAB W/ECG: CPT

## 2020-09-25 NOTE — CARDIO/PULMONARY
Manisha Taylor  18  presented for cardiac rehab orientation and exercise tolerance test today with a primary diagnosis of NSTEMI and PCi (Cx X2 FAM stents) . Pt has no previous history herself, but has a strong family history. LVEF is 50% via ECHO on . Cardiac risk factors include:  HTN, HLD, and sedentary lifestyle. CAD risk factors were reviewed with patient. Pt is   and lives with her  and daughter. She is retired. Depression score PHQ9 is 7 and this is considered high. The result was discussed with patient who affirms score to be accurate. Ten days after pt was discharged, her  had a cardiac arrest at home. Pt performed CPR. Pt was called to the hospital x2 to be with her  who was going to die.  survived. However, there is still some stress, anxiety accompanying their buddy issues. She is optomistic and her  will be enrolling in cardiac rehab at [facility] in 1 week. Patient denied chest pain or SOB during 6 minute walk on treadmill at 1.7 mph, with RPE 12. Cardiac rhythm was SR with rare PVCs, pacs in poist exercise rest.  Exercise plan was developed. Pt will attend cardiac rehab 2-3 times per week. Cardiac Rehab book reviewed and given to patient.   
Kevin Moore RN

## 2020-09-29 ENCOUNTER — HOSPITAL ENCOUNTER (OUTPATIENT)
Dept: CARDIAC REHAB | Age: 70
Discharge: HOME | End: 2020-09-29
Payer: MEDICARE

## 2020-09-29 VITALS — BODY MASS INDEX: 30.12 KG/M2 | WEIGHT: 175.5 LBS

## 2020-09-29 PROCEDURE — 93798 PHYS/QHP OP CAR RHAB W/ECG: CPT

## 2020-10-01 ENCOUNTER — HOSPITAL ENCOUNTER (OUTPATIENT)
Dept: CARDIAC REHAB | Age: 70
Discharge: HOME | End: 2020-10-01
Payer: MEDICARE

## 2020-10-01 VITALS — WEIGHT: 176.4 LBS | BODY MASS INDEX: 30.28 KG/M2

## 2020-10-01 PROCEDURE — 93798 PHYS/QHP OP CAR RHAB W/ECG: CPT

## 2020-10-06 ENCOUNTER — HOSPITAL ENCOUNTER (OUTPATIENT)
Dept: CARDIAC REHAB | Age: 70
Discharge: HOME | End: 2020-10-06
Payer: MEDICARE

## 2020-10-06 VITALS — BODY MASS INDEX: 29.97 KG/M2 | WEIGHT: 174.6 LBS

## 2020-10-06 PROCEDURE — 93798 PHYS/QHP OP CAR RHAB W/ECG: CPT

## 2020-10-09 ENCOUNTER — HOSPITAL ENCOUNTER (OUTPATIENT)
Dept: CARDIAC REHAB | Age: 70
Discharge: HOME | End: 2020-10-09
Payer: MEDICARE

## 2020-10-09 VITALS — BODY MASS INDEX: 30.21 KG/M2 | WEIGHT: 176 LBS

## 2020-10-09 PROCEDURE — 93798 PHYS/QHP OP CAR RHAB W/ECG: CPT

## 2020-10-13 ENCOUNTER — HOSPITAL ENCOUNTER (OUTPATIENT)
Dept: CARDIAC REHAB | Age: 70
Discharge: HOME | End: 2020-10-13
Payer: MEDICARE

## 2020-10-13 VITALS — BODY MASS INDEX: 30.38 KG/M2 | WEIGHT: 177 LBS

## 2020-10-13 PROCEDURE — 93798 PHYS/QHP OP CAR RHAB W/ECG: CPT

## 2020-10-16 ENCOUNTER — HOSPITAL ENCOUNTER (OUTPATIENT)
Dept: CARDIAC REHAB | Age: 70
Discharge: HOME | End: 2020-10-16
Payer: MEDICARE

## 2020-10-16 VITALS — BODY MASS INDEX: 30.48 KG/M2 | WEIGHT: 177.6 LBS

## 2020-10-16 PROCEDURE — 93798 PHYS/QHP OP CAR RHAB W/ECG: CPT

## 2020-10-20 ENCOUNTER — OFFICE VISIT (OUTPATIENT)
Dept: CARDIOLOGY CLINIC | Age: 70
End: 2020-10-20
Payer: MEDICARE

## 2020-10-20 ENCOUNTER — HOSPITAL ENCOUNTER (OUTPATIENT)
Dept: CARDIAC REHAB | Age: 70
Discharge: HOME | End: 2020-10-20
Payer: MEDICARE

## 2020-10-20 VITALS
SYSTOLIC BLOOD PRESSURE: 144 MMHG | OXYGEN SATURATION: 96 % | WEIGHT: 177 LBS | HEART RATE: 64 BPM | DIASTOLIC BLOOD PRESSURE: 84 MMHG | BODY MASS INDEX: 30.22 KG/M2 | HEIGHT: 64 IN

## 2020-10-20 VITALS — BODY MASS INDEX: 30.55 KG/M2 | WEIGHT: 178 LBS

## 2020-10-20 DIAGNOSIS — E78.5 DYSLIPIDEMIA: Primary | ICD-10-CM

## 2020-10-20 DIAGNOSIS — I25.118 CORONARY ARTERY DISEASE OF NATIVE HEART WITH STABLE ANGINA PECTORIS, UNSPECIFIED VESSEL OR LESION TYPE (HCC): ICD-10-CM

## 2020-10-20 PROCEDURE — G8417 CALC BMI ABV UP PARAM F/U: HCPCS | Performed by: SPECIALIST

## 2020-10-20 PROCEDURE — 3017F COLORECTAL CA SCREEN DOC REV: CPT | Performed by: SPECIALIST

## 2020-10-20 PROCEDURE — 99214 OFFICE O/P EST MOD 30 MIN: CPT | Performed by: SPECIALIST

## 2020-10-20 PROCEDURE — G8400 PT W/DXA NO RESULTS DOC: HCPCS | Performed by: SPECIALIST

## 2020-10-20 PROCEDURE — G8427 DOCREV CUR MEDS BY ELIG CLIN: HCPCS | Performed by: SPECIALIST

## 2020-10-20 PROCEDURE — 1090F PRES/ABSN URINE INCON ASSESS: CPT | Performed by: SPECIALIST

## 2020-10-20 PROCEDURE — G8432 DEP SCR NOT DOC, RNG: HCPCS | Performed by: SPECIALIST

## 2020-10-20 PROCEDURE — 93798 PHYS/QHP OP CAR RHAB W/ECG: CPT

## 2020-10-20 PROCEDURE — 1101F PT FALLS ASSESS-DOCD LE1/YR: CPT | Performed by: SPECIALIST

## 2020-10-20 PROCEDURE — G8536 NO DOC ELDER MAL SCRN: HCPCS | Performed by: SPECIALIST

## 2020-10-20 RX ORDER — LOSARTAN POTASSIUM AND HYDROCHLOROTHIAZIDE 25; 100 MG/1; MG/1
0.5 TABLET ORAL DAILY
Qty: 45 TAB | Refills: 3 | Status: SHIPPED | OUTPATIENT
Start: 2020-10-20 | End: 2021-01-28

## 2020-10-20 NOTE — PROGRESS NOTES
Chidi Myers is a 65 y.o. female    Visit Vitals  BP (!) 144/84 (BP 1 Location: Left arm, BP Patient Position: Sitting)   Pulse 64   Ht 5' 4\" (1.626 m)   Wt 177 lb (80.3 kg)   SpO2 96%   BMI 30.38 kg/m²       Chief Complaint   Patient presents with    Coronary Artery Disease    Hypertension    Other     NSTEMI    Other     DLD       Chest pain NO  SOB YES   Dizziness NO  Swelling NO  Recent hospital visit NO  Refills NO

## 2020-10-20 NOTE — PROGRESS NOTES
Castro Griggs MD. [facility]              Patient: Barbara Grey  : 1950      Today's Date: 10/20/2020            HISTORY OF PRESENT ILLNESS:     History of Present Illness:  Here for follow-up. Has random episodes of SOB - takes 3 deep breaths and then she is OK. She feels problems may be nasal.              PAST MEDICAL HISTORY:     Past Medical History:   Diagnosis Date    CAD (coronary artery disease)     NSTEMI - PCI to LCX on 20     Dyslipidemia     Hypertension     Hypothyroid     [facility] elevated [facility]     NSTEMI 20    PAF (paroxysmal atrial fibrillation) ([facility]     Arrived with Afib with RVR 20 - converted to NSR spontaneously same day    Statin intolerance          Past Surgical History:   Procedure Laterality Date    CARDIAC SURG PROCEDURE UNLIST      PCI to Cx X 2 FAM    HX CORONARY STENT PLACEMENT      HX GYN      tubal ligation           MEDICATIONS:     Current Outpatient Medications   Medication Sig Dispense Refill    losartan (COZAAR) 25 mg tablet Take 1 Tab by mouth nightly. 30 Tab 12    Eliquis 5 mg tablet TAKE 1 TABLET BY MOUTH  TWICE DAILY 180 Tab 3    Brilinta 90 mg tablet TAKE 1 TABLET BY MOUTH  EVERY TWELVE HOURS 180 Tab 3    metoprolol tartrate (LOPRESSOR) 25 mg tablet TAKE 1 TABLET BY MOUTH  EVERY 12 HOURS 180 Tab 3    ezetimibe (ZETIA) 10 mg tablet Take 1 Tab by mouth daily. 90 Tab 3    rosuvastatin (CRESTOR) 5 mg tablet Take 1 Tab by mouth every Monday, Wednesday, Friday. 40 Tab 0    omega 3-DHA-EPA-fish oil 1,000 mg (120 mg-180 mg) capsule Take 3 Caps by mouth daily.  cholecalciferol (VITAMIN D3) (5000 Units/125 mcg) tab tablet Take 5,000 Units by mouth daily.  ascorbic acid, vitamin C, (Vitamin C) 1,000 mg tablet Take 1,000 mg by mouth daily.  clonazePAM (KlonoPIN) 0.5 mg tablet Take 0.5 mg by mouth nightly.  liothyronine (CYTOMEL) 5 mcg tablet Take 2.5 mcg by mouth daily.       levothyroxine (SYNTHROID) 125 mcg tablet Take 125 mcg by mouth Daily (before breakfast).  nitroglycerin (NITROSTAT) 0.4 mg SL tablet 0.4 mg by SubLINGual route every five (5) minutes as needed for Chest Pain. Up to 3 doses.  VITAMIN K2 PO Take 1 Tab by mouth daily. Allergies   Allergen Reactions    Adhesive Rash    Neosporin Plus [Rapui-Huofn-Jqihlos-Pramoxine] Rash    Pcn [Penicillins] Rash     Childhood rash             SOCIAL HISTORY:     Social History     Tobacco Use    Smoking status: Never Smoker    Smokeless tobacco: Never Used   Substance Use Topics    Alcohol use: Not Currently    Drug use: Never         FAMILY HISTORY:     Family History   Problem Relation Age of Onset    Heart Disease Mother     Heart Disease Father               REVIEW OF SYMPTOMS:      Review of Symptoms:  Constitutional: Negative for fever, chills  HEENT: Negative for nosebleeds, tinnitus, and vision changes. Respiratory: Negative for cough, wheezing  Cardiovascular: Negative for orthopnea, claudication, syncope, and PND. Gastrointestinal: Negative for abdominal pain, diarrhea, melena. Genitourinary: Negative for dysuria  Musculoskeletal: Negative for myalgias. Skin: Negative for rash  Heme: No problems bleeding. Neurological: Negative for speech change and focal weakness.            PHYSICAL EXAM:      Physical Exam:  Visit Vitals  BP (!) 144/84 (BP 1 Location: Left arm, BP Patient Position: Sitting)   Pulse 64   Ht 5' 4\" (1.626 m)   Wt 177 lb (80.3 kg)   SpO2 96%   BMI 30.38 kg/m²          Patient appears generally well, mood and affect are appropriate and pleasant. HEENT:  Hearing intact, non-icteric, normocephalic, atraumatic. Neck Exam: Supple, No JVD or carotid bruits. Lung Exam: Clear to auscultation, even breath sounds. Cardiac Exam: Regular rate and rhythm with no murmur or rub  Abdomen: Soft, non-tender, normal bowel sounds. .  Extremities: Moves all ext well.  No lower extremity edema. Psych: Appropriate affect  Neuro - Grossly intact           LABS / OTHER STUDIES:         Lab Results   Component Value Date/Time    Sodium 142 09/08/2020 12:42 PM    Potassium 4.6 09/08/2020 12:42 PM    Chloride 108 (H) 09/08/2020 12:42 PM    CO2 24 09/08/2020 12:42 PM    Anion gap 9 06/20/2020 03:34 AM    Glucose 103 (H) 09/08/2020 12:42 PM    BUN 20 09/08/2020 12:42 PM    Creatinine 1.27 (H) 09/08/2020 12:42 PM    BUN/Creatinine ratio 16 09/08/2020 12:42 PM    GFR est AA 49 (L) 09/08/2020 12:42 PM    GFR est non-AA 42 (L) 09/08/2020 12:42 PM    Calcium 10.5 (H) 09/08/2020 12:42 PM    Bilirubin, total 0.7 06/20/2020 03:34 AM    Alk.  phosphatase 44 (L) 06/20/2020 03:34 AM    Protein, total 7.0 06/20/2020 03:34 AM    Albumin 3.5 06/20/2020 03:34 AM    Globulin 3.5 06/20/2020 03:34 AM    A-G Ratio 1.0 (L) 06/20/2020 03:34 AM    ALT (SGPT) 49 06/20/2020 03:34 AM    AST (SGOT) 40 (H) 06/20/2020 03:34 AM     Lab Results   Component Value Date/Time    WBC 3.8 09/08/2020 12:42 PM    HGB 14.4 09/08/2020 12:42 PM    HCT 43.8 09/08/2020 12:42 PM    PLATELET 714 83/16/8555 12:42 PM    MCV 93 09/08/2020 12:42 PM       Lab Results   Component Value Date/Time    Cholesterol, total 314 (H) 06/20/2020 03:34 AM    Cholesterol, Total 238 (H) 09/08/2020 12:42 PM    HDL Cholesterol 41 06/20/2020 03:34 AM    LDL, calculated 202.6 (H) 06/20/2020 03:34 AM    VLDL, calculated 70.4 06/20/2020 03:34 AM    Triglyceride 352 (H) 06/20/2020 03:34 AM    CHOL/HDL Ratio 7.7 (H) 06/20/2020 03:34 AM                CARDIAC DIAGNOSTICS:      Cardiac Evaluation Includes:     EKG 6/19/20 - Afib with RVR, , St depression V2-V4   EKG 6/19/20 later in day - sinus cynthia, otherwise normal         St[facility] test in 2020 at [facility] (Dr. Orlando Becerra) - was OK per patient      Echo 6/19/20 - LVEF 50-55%, inferolateral and anterolateral HK, mild LAE, MAC and mild MR, AV calcification      Cardiac Cath 6/19/20 - LM 30%, LAD 30%, pLCX 100%,  RCA: subselective inj; MLI - not well visualized.  LVEDP: 9   --->  FAM 2.5 by 33 to LCX     Event Monitor 8/1/20 - 8/30 - normal         ASSESSMENT AND PLAN:      Assessment and Plan:  1) NSTEMI   - presented with CP 6/19/20 and was in afib with RVR  - Cardiac cath 6/19/20 ---> FAM to 100% pLCX stenosis    - See below regarding statin   - Continue BB  - She is doing well without further chest pain   - In cardiac rehab  - Continue Brilinta for one year s/p MI - - cont OAC      2) PAF  - Arrived with Afib with RVR 6/19/20 in setting of NSTEMI - converted to NSR spontaneously same day  - Continue BB  - Event monitor was normal 8/1/20-8/30/20   - Will continue [address] for now (although she does not like taking it)--- Since Afib came in a setting of acute MI and then resolved spontaneously and her FU event monitor was normal, will consider stopping [address] eventually (maybe consider ILR in order to do so safely) -----> will have him see Dr. Juana Cardoza to discuss this option - for now continue Eliquis      3) Dyslipidemia   - LDL was 202 on 6/20/20   - she has been intolerant of multiple statins (muscle pain)   - On 6/20/20 started Crestor 5 mg three days a week. - Added Zetia 7/2020  - On 10/20 - lipids still high ---> Discussed Repatha ---> she wants to work on diet before considering taking this      4) HTN  - BP still a little high   - Increase to losartan-HCTZ 50/12.5 --- follow labs      5) See me in 3 months    had a cardiac arrest 6/30/20 and then went to [facility] (she did CPR).          Nicki Corodn MD, [address] [place]  [address] [place] [address] Vibha Perdomo, [address] [id number] [facility]  [address]  [facility], [address] [place] [address]  Ph: [phone]                               [phone]

## 2020-10-21 RX ORDER — ROSUVASTATIN CALCIUM 5 MG/1
5 TABLET, COATED ORAL
Qty: 39 TAB | Refills: 3 | Status: SHIPPED | OUTPATIENT
Start: 2020-10-21 | End: 2022-06-16

## 2020-10-21 NOTE — TELEPHONE ENCOUNTER
Per Dr. Erickson See VO:  VCN:[id number]  Future Appointments   Date Time Provider Jacqueline Huynh   10/23/2020 10:30 AM [facility] [address] [facility]   10/27/2020 10:30 AM [facility] CARDIOPULM EXERCISE [facility]   10/30/2020 10:30 AM [facility] CARDIOPULM EXERCISE [facility]   10/30/2020  3:00 PM MD THAI Robb BS AMB   1/28/2021 11:00 AM MD LONDON HernandezF BS AMB     Requested Prescriptions     Pending Prescriptions Disp Refills    rosuvastatin (CRESTOR) 5 mg tablet [Pharmacy Med Name: ROSUVASTATIN  5MG  TAB] 39 Tab 3     Sig: TAKE 1 TAB BY MOUTH EVERY  MONDAY, WEDNESDAY, FRIDAY.

## 2020-10-23 ENCOUNTER — HOSPITAL ENCOUNTER (OUTPATIENT)
Dept: CARDIAC REHAB | Age: 70
Discharge: HOME | End: 2020-10-23
Payer: MEDICARE

## 2020-10-23 VITALS — BODY MASS INDEX: 30.21 KG/M2 | WEIGHT: 176 LBS

## 2020-10-23 PROCEDURE — 93798 PHYS/QHP OP CAR RHAB W/ECG: CPT

## 2020-10-26 ENCOUNTER — HOSPITAL ENCOUNTER (OUTPATIENT)
Dept: CARDIAC REHAB | Age: 70
Discharge: HOME | End: 2020-10-26
Payer: MEDICARE

## 2020-10-26 VITALS — WEIGHT: 176 LBS | BODY MASS INDEX: 30.21 KG/M2

## 2020-10-26 PROCEDURE — 93798 PHYS/QHP OP CAR RHAB W/ECG: CPT

## 2020-10-27 ENCOUNTER — APPOINTMENT (OUTPATIENT)
Dept: CARDIAC REHAB | Age: 70
End: 2020-10-27
Payer: MEDICARE

## 2020-10-29 ENCOUNTER — HOSPITAL ENCOUNTER (OUTPATIENT)
Dept: CARDIAC REHAB | Age: 70
Discharge: HOME | End: 2020-10-29
Payer: MEDICARE

## 2020-10-29 PROCEDURE — 93798 PHYS/QHP OP CAR RHAB W/ECG: CPT

## 2020-10-30 ENCOUNTER — OFFICE VISIT (OUTPATIENT)
Dept: CARDIOLOGY CLINIC | Age: 70
End: 2020-10-30
Payer: MEDICARE

## 2020-10-30 ENCOUNTER — APPOINTMENT (OUTPATIENT)
Dept: CARDIAC REHAB | Age: 70
End: 2020-10-30
Payer: MEDICARE

## 2020-10-30 VITALS — WEIGHT: 176.2 LBS | BODY MASS INDEX: 30.24 KG/M2

## 2020-10-30 VITALS
WEIGHT: 175 LBS | DIASTOLIC BLOOD PRESSURE: 66 MMHG | HEART RATE: 80 BPM | BODY MASS INDEX: 29.88 KG/M2 | OXYGEN SATURATION: 96 % | HEIGHT: 64 IN | SYSTOLIC BLOOD PRESSURE: 122 MMHG

## 2020-10-30 DIAGNOSIS — I25.118 CORONARY ARTERY DISEASE OF NATIVE HEART WITH STABLE ANGINA PECTORIS, UNSPECIFIED VESSEL OR LESION TYPE (HCC): ICD-10-CM

## 2020-10-30 DIAGNOSIS — I48.91 ATRIAL FIBRILLATION, UNSPECIFIED TYPE (HCC): ICD-10-CM

## 2020-10-30 DIAGNOSIS — I48.0 PAF (PAROXYSMAL ATRIAL FIBRILLATION) (HCC): Primary | ICD-10-CM

## 2020-10-30 DIAGNOSIS — I25.10 CORONARY ARTERY DISEASE INVOLVING NATIVE CORONARY ARTERY OF NATIVE HEART WITHOUT ANGINA PECTORIS: ICD-10-CM

## 2020-10-30 DIAGNOSIS — E78.5 DYSLIPIDEMIA: ICD-10-CM

## 2020-10-30 DIAGNOSIS — I10 ESSENTIAL HYPERTENSION: ICD-10-CM

## 2020-10-30 PROCEDURE — G8400 PT W/DXA NO RESULTS DOC: HCPCS | Performed by: INTERNAL MEDICINE

## 2020-10-30 PROCEDURE — G8536 NO DOC ELDER MAL SCRN: HCPCS | Performed by: INTERNAL MEDICINE

## 2020-10-30 PROCEDURE — G8510 SCR DEP NEG, NO PLAN REQD: HCPCS | Performed by: INTERNAL MEDICINE

## 2020-10-30 PROCEDURE — 99215 OFFICE O/P EST HI 40 MIN: CPT | Performed by: INTERNAL MEDICINE

## 2020-10-30 PROCEDURE — 1101F PT FALLS ASSESS-DOCD LE1/YR: CPT | Performed by: INTERNAL MEDICINE

## 2020-10-30 PROCEDURE — 3017F COLORECTAL CA SCREEN DOC REV: CPT | Performed by: INTERNAL MEDICINE

## 2020-10-30 PROCEDURE — G8427 DOCREV CUR MEDS BY ELIG CLIN: HCPCS | Performed by: INTERNAL MEDICINE

## 2020-10-30 PROCEDURE — 1090F PRES/ABSN URINE INCON ASSESS: CPT | Performed by: INTERNAL MEDICINE

## 2020-10-30 PROCEDURE — G8417 CALC BMI ABV UP PARAM F/U: HCPCS | Performed by: INTERNAL MEDICINE

## 2020-10-30 NOTE — PROGRESS NOTES
Room 2    Visit Vitals  /66 (BP 1 Location: Left arm, BP Patient Position: Sitting)   Pulse 80   Ht 5' 4\" (1.626 m)   Wt 175 lb (79.4 kg)   SpO2 96%   BMI 30.04 kg/m²     Discuss ILR     SOB at weird times, takes 3 deep breaths and it goes away.     Monitor 8-1 to 8-30 NORMAL    Have you ever seen a electrophysiologist: YES    Chest pain: no  Shortness of breath: YES Edema: no  Palpitations, Skipped beats, Rapid heartbeat: no  Dizziness: no    (Recent) New diagnosis/Surgeries: no    ER/Hospitalizations for your symptoms: no

## 2020-10-30 NOTE — PROGRESS NOTES
HISTORY OF PRESENTING ILLNESS      Alvarado Alvarenga is a 65 y.o. female with history of MI, CAD s/p PCI, PAF, hypertension, SVT presnting to discuss whether to continue NOAC and whether to consider ILR for monitoring for recurrent AF given her recent 30 day monitor. PAST MEDICAL HISTORY     Past Medical History:   Diagnosis Date    CAD (coronary artery disease)     NSTEMI - PCI to LCX on 6/19/20     Dyslipidemia     Hypertension     Hypothyroid     [email or web address]     NSTEMI 6/19/20    PAF (paroxysmal atrial fibrillation) ([email or web address]     Arrived with Afib with RVR 6/19/20 - converted to NSR spontaneously same day    Statin intolerance            PAST SURGICAL HISTORY     Past Surgical History:   Procedure Laterality Date    CARDIAC SURG PROCEDURE UNLIST      PCI to Cx X 2 FAM    HX CORONARY STENT PLACEMENT      HX GYN      tubal ligation          ALLERGIES     Allergies   Allergen Reactions    Adhesive Rash    Neosporin Plus [Cuqrb-Fpuqy-Qhiatxn-Pramoxine] Rash    Pcn [Penicillins] Rash     Childhood rash          FAMILY HISTORY     Family History   Problem Relation Age of Onset    Heart Disease Mother     Heart Disease Father     negative for cardiac disease       SOCIAL HISTORY     Social History     Socioeconomic History    Marital status:      Spouse name: Nelson Soto Number of children: 2    Years of education: 12    Highest education level:  Bachelor's degree (e.g., BA, AB, BS)   Social Needs    Financial resource strain: Not very hard    Food insecurity     Worry: Never true     Inability: Never true    Transportation needs     Medical: No     Non-medical: No   Tobacco Use    Smoking status: Never Smoker    Smokeless tobacco: Never Used   Substance and Sexual Activity    Alcohol use: Not Currently    Drug use: Never    Sexual activity: Not Currently   Lifestyle    Physical activity     Days per week: 0 days     Minutes per session: 0 min    Stress: Only a little   Relationships    Social connections     Talks on phone: More than three times a week     Gets together: Once a week     Attends Zoroastrian service: Never     Active member of club or organization: No     Attends meetings of clubs or organizations: Never     Relationship status:    Other Topics Concern     Service No    Blood Transfusions No    Caffeine Concern No    Occupational Exposure No    Hobby Hazards No    Sleep Concern No    Stress Concern No    Weight Concern No    Special Diet No    Back Care No    Exercise No    Bike Helmet No    Seat Belt No    Self-Exams No         MEDICATIONS     Current Outpatient Medications   Medication Sig    rosuvastatin (CRESTOR) 5 mg tablet TAKE 1 TAB BY MOUTH EVERY  MONDAY, WEDNESDAY, FRIDAY.  losartan-hydroCHLOROthiazide (HYZAAR) 100-25 mg per tablet Take 0.5 Tabs by mouth daily.  Eliquis 5 mg tablet TAKE 1 TABLET BY MOUTH  TWICE DAILY    Brilinta 90 mg tablet TAKE 1 TABLET BY MOUTH  EVERY TWELVE HOURS    metoprolol tartrate (LOPRESSOR) 25 mg tablet TAKE 1 TABLET BY MOUTH  EVERY 12 HOURS    ezetimibe (ZETIA) 10 mg tablet Take 1 Tab by mouth daily.  omega 3-DHA-EPA-fish oil 1,000 mg (120 mg-180 mg) capsule Take 3 Caps by mouth daily.  cholecalciferol (VITAMIN D3) (5000 Units/125 mcg) tab tablet Take 5,000 Units by mouth daily.  ascorbic acid, vitamin C, (Vitamin C) 1,000 mg tablet Take 1,000 mg by mouth daily.  clonazePAM (KlonoPIN) 0.5 mg tablet Take 0.5 mg by mouth nightly.  liothyronine (CYTOMEL) 5 mcg tablet Take 2.5 mcg by mouth daily.  levothyroxine (SYNTHROID) 125 mcg tablet Take 125 mcg by mouth Daily (before breakfast).  nitroglycerin (NITROSTAT) 0.4 mg SL tablet 0.4 mg by SubLINGual route every five (5) minutes as needed for Chest Pain. Up to 3 doses. No current facility-administered medications for this visit.         I have reviewed the nurses notes, vitals, problem list, allergy list, medical history, family, social history and medications. REVIEW OF SYMPTOMS      General: Pt denies excessive weight gain or loss. Pt is able to conduct ADL's  HEENT: Denies blurred vision, headaches, hearing loss, epistaxis and difficulty swallowing. Respiratory: Denies cough, congestion, shortness of breath, FAULKNER, wheezing or stridor. Cardiovascular: Denies precordial pain, palpitations, edema or PND  Gastrointestinal: Denies poor appetite, indigestion, abdominal pain or blood in stool  Genitourinary: Denies hematuria, dysuria, increased urinary frequency  Musculoskeletal: Denies joint pain or swelling from muscles or joints  Neurologic: Denies tremor, paresthesias, headache, or sensory motor disturbance  Psychiatric: Denies confusion, insomnia, depression  Integumentray: Denies rash, itching or ulcers. Hematologic: Denies easy bruising, bleeding       PHYSICAL EXAMINATION      Vitals: see vitals section  General: Well developed, in no acute distress. HEENT: No jaundice, oral mucosa moist, no oral ulcers  Neck: Supple, no stiffness, no lymphadenopathy, supple  Heart:  Normal S1/S2 negative S3 or S4. Regular, no murmur, gallop or rub, no jugular venous distention  Respiratory: Clear bilaterally x 4, no wheezing or rales  Abdomen:   Soft, non-tender, bowel sounds are active. Extremities:  No edema, normal cap refill, no cyanosis. Musculoskeletal: No clubbing, no deformities  Neuro: A&Ox3, speech clear, gait stable, cooperative, no focal neurologic deficits  Skin: Skin color is normal. No rashes or lesions.  Non diaphoretic, moist.  Vascular: 2+ pulses symmetric in all extremities       DIAGNOSTIC DATA      EKG:        LABORATORY DATA      Lab Results   Component Value Date/Time    WBC 3.8 09/08/2020 12:42 PM    HGB 14.4 09/08/2020 12:42 PM    HCT 43.8 09/08/2020 12:42 PM    PLATELET 239 31/55/2740 12:42 PM    MCV 93 09/08/2020 12:42 PM      Lab Results   Component Value Date/Time    Sodium 142 09/08/2020 12:42 PM    Potassium 4.6 09/08/2020 12:42 PM    Chloride 108 (H) 09/08/2020 12:42 PM    CO2 24 09/08/2020 12:42 PM    Anion gap 9 06/20/2020 03:34 AM    Glucose 103 (H) 09/08/2020 12:42 PM    BUN 20 09/08/2020 12:42 PM    Creatinine 1.27 (H) 09/08/2020 12:42 PM    BUN/Creatinine ratio 16 09/08/2020 12:42 PM    GFR est AA 49 (L) 09/08/2020 12:42 PM    GFR est non-AA 42 (L) 09/08/2020 12:42 PM    Calcium 10.5 (H) 09/08/2020 12:42 PM    Bilirubin, total 0.7 06/20/2020 03:34 AM    Alk. phosphatase 44 (L) 06/20/2020 03:34 AM    Protein, total 7.0 06/20/2020 03:34 AM    Albumin 3.5 06/20/2020 03:34 AM    Globulin 3.5 06/20/2020 03:34 AM    A-G Ratio 1.0 (L) 06/20/2020 03:34 AM    ALT (SGPT) 49 06/20/2020 03:34 AM           ASSESSMENT      1. Supraventricular tachycardia  2. Hypertension  3. Atrial fibrillation  4. Dyslipidemia  5. CAD  6. Hx myocardial infarction  7. Percutaneous transluminal angioplasty       PLAN     Discussed ILR vs iWATCH. She will consider and notify us of how she wishes to proceed. ICD-10-CM ICD-9-CM    1. PAF (paroxysmal atrial fibrillation) ([facility])  I48.0 427.31    2. Coronary artery disease of native heart with stable angina pectoris, unspecified vessel or lesion type ([facility]  I25.118 414.01      413.9    3. Essential hypertension  I10 401.9    4. Atrial fibrillation, unspecified type ([facility]  I48.91 427.31    5. Dyslipidemia  E78.5 272.4    6. Coronary artery disease involving native coronary artery of native heart without angina pectoris  I25.10 414.01      No orders of the defined types were placed in this encounter. FOLLOW-UP     TBD      Thank you, Jo Parker MD and Dr. Roark Klinefelter for allowing me to participate in the care of this extraordinarily pleasant female. Please do not hesitate to contact me for further questions/concerns.          Juvencio Shen MD  Cardiac Electrophysiology / Cardiology    [address], [address] [place] [address]    Harsh Gonzalezjyotihardik  [phone] / [phone] Fax   [phone] / [phone] Fax

## 2020-11-03 ENCOUNTER — TELEPHONE (OUTPATIENT)
Dept: CARDIOLOGY CLINIC | Age: 70
End: 2020-11-03

## 2020-11-03 ENCOUNTER — HOSPITAL ENCOUNTER (OUTPATIENT)
Dept: CARDIAC REHAB | Age: 70
Discharge: HOME | End: 2020-11-03
Payer: MEDICARE

## 2020-11-03 VITALS — BODY MASS INDEX: 29.94 KG/M2 | WEIGHT: 174.4 LBS

## 2020-11-03 PROCEDURE — 93798 PHYS/QHP OP CAR RHAB W/ECG: CPT

## 2020-11-03 NOTE — TELEPHONE ENCOUNTER
Pt returning Emili's call. Pt states she will go with the chip for the eliquis.  Please advise      [facility] [place]:[phone]

## 2020-11-03 NOTE — TELEPHONE ENCOUNTER
Patient calling stating that she would like to get chip inserted for rx (eliquis) - patient would like to consult with Dr. Mike Teresa / his nurse prior though for she has some questions. Please call patient.      Branden # [phone]

## 2020-11-04 NOTE — TELEPHONE ENCOUNTER
Returned patient call, ID verified using two patient identifiers. Patient states she is 99% sure she wants to proceed with the ILR but she has some questions she would like to discuss with Dr. Shruthi Rolle or his NP JESENIA Dias. Patient declines to have me relay questions to them she prefers to have one of them call her. Advised her that I would relay her message to Dr. Sienna Bagley. Froy Dias NP. Patient agreeable to this plan. Best contact #'s [phone] or [phone].

## 2020-11-06 ENCOUNTER — HOSPITAL ENCOUNTER (OUTPATIENT)
Dept: CARDIAC REHAB | Age: 70
Discharge: HOME | End: 2020-11-06
Payer: MEDICARE

## 2020-11-06 ENCOUNTER — TELEPHONE (OUTPATIENT)
Dept: CARDIOLOGY CLINIC | Age: 70
End: 2020-11-06

## 2020-11-06 VITALS — WEIGHT: 175.8 LBS | BODY MASS INDEX: 30.18 KG/M2

## 2020-11-06 PROCEDURE — 93798 PHYS/QHP OP CAR RHAB W/ECG: CPT

## 2020-11-06 NOTE — TELEPHONE ENCOUNTER
Patient states she is returning call to nurse from Dr. Bg Johnson or Dr. Arcelia Bernabe. Please advise.     [facility] # [phone]

## 2020-11-06 NOTE — TELEPHONE ENCOUNTER
Unable to reach patient with return call. Left voicemail for return call to office.     Emily Sandoval NP

## 2020-11-09 NOTE — TELEPHONE ENCOUNTER
Returned phone call, left vm and also advised patient to relay her questions via [address] [place] [address] [address].      Pati Roberts NP

## 2020-11-10 ENCOUNTER — HOSPITAL ENCOUNTER (OUTPATIENT)
Dept: CARDIAC REHAB | Age: 70
Discharge: HOME | End: 2020-11-10
Payer: MEDICARE

## 2020-11-10 VITALS — BODY MASS INDEX: 30.38 KG/M2 | WEIGHT: 177 LBS

## 2020-11-10 PROCEDURE — 93798 PHYS/QHP OP CAR RHAB W/ECG: CPT

## 2020-11-13 ENCOUNTER — HOSPITAL ENCOUNTER (OUTPATIENT)
Dept: CARDIAC REHAB | Age: 70
Discharge: HOME | End: 2020-11-13
Payer: MEDICARE

## 2020-11-13 VITALS — WEIGHT: 176.8 LBS | BODY MASS INDEX: 30.35 KG/M2

## 2020-11-13 PROCEDURE — 93798 PHYS/QHP OP CAR RHAB W/ECG: CPT

## 2020-11-17 ENCOUNTER — HOSPITAL ENCOUNTER (OUTPATIENT)
Dept: CARDIAC REHAB | Age: 70
Discharge: HOME | End: 2020-11-17
Payer: MEDICARE

## 2020-11-17 VITALS — BODY MASS INDEX: 30.35 KG/M2 | WEIGHT: 176.8 LBS

## 2020-11-17 PROCEDURE — 93798 PHYS/QHP OP CAR RHAB W/ECG: CPT

## 2020-11-20 ENCOUNTER — HOSPITAL ENCOUNTER (OUTPATIENT)
Dept: CARDIAC REHAB | Age: 70
Discharge: HOME | End: 2020-11-20
Payer: MEDICARE

## 2020-11-20 VITALS — BODY MASS INDEX: 30.35 KG/M2 | WEIGHT: 176.8 LBS

## 2020-11-20 PROCEDURE — 93797 PHYS/QHP OP CAR RHAB WO ECG: CPT | Performed by: DIETITIAN, REGISTERED

## 2020-11-20 PROCEDURE — 93798 PHYS/QHP OP CAR RHAB W/ECG: CPT

## 2020-11-20 NOTE — CARDIO/PULMONARY
Cardiac Rehab Nutrition Assessment - 1:1 Evaluation NAME: Salomon Campbell : 1950 AGE: 65 y.o. GENDER: female CARDIAC REHAB ADMITTING DIAGNOSIS: MI & stents Relevant Comorbidites: hypertension, dyslipidemia and cardiovascular disease LABS:  
Lab Results Component Value Date/Time Hemoglobin A1c 5.9 (H) 2020 03:34 AM  
 
Lab Results Component Value Date/Time Cholesterol, total 314 (H) 2020 03:34 AM  
 Cholesterol, Total 238 (H) 2020 12:42 PM  
 HDL Cholesterol 41 2020 03:34 AM  
 LDL, calculated 202.6 (H) 2020 03:34 AM  
 VLDL, calculated 70.4 2020 03:34 AM  
 Triglyceride 352 (H) 2020 03:34 AM  
 CHOL/HDL Ratio 7.7 (H) 2020 03:34 AM  
 
 
 
MEDICATIONS/SUPPLEMENTS:  
@[facility]@ Prior to Admission medications Medication Sig Start Date End Date Taking? Authorizing Provider  
rosuvastatin (CRESTOR) 5 mg tablet TAKE 1 TAB BY MOUTH EVERY  MONDAY, WEDNESDAY, FRIDAY. 10/21/20   Castro Christensen MD  
losartan-hydroCHLOROthiazide [facility]) 100-25 mg per tablet Take 0.5 Tabs by mouth daily. 10/20/20   Castro Christensen MD  
Eliquis 5 mg tablet TAKE 1 TABLET BY MOUTH  TWICE DAILY 20   Castro Christensen MD  
Brilinta 90 mg tablet TAKE 1 TABLET BY MOUTH  EVERY TWELVE HOURS 20   Castro Christensen MD  
metoprolol tartrate (LOPRESSOR) 25 mg tablet TAKE 1 TABLET BY MOUTH  EVERY 12 HOURS 20   Castro Christensen MD  
ezetimibe (ZETIA) 10 mg tablet Take 1 Tab by mouth daily. 20   Castro Christensen MD  
omega 8-IJA-VSN-fish oil 1,000 mg (120 mg-180 mg) capsule Take 3 Caps by mouth daily. Provider, Historical  
cholecalciferol (VITAMIN D3) (5000 Units/125 mcg) tab tablet Take 5,000 Units by mouth daily. Provider, Historical  
ascorbic acid, vitamin C, (Vitamin C) 1,000 mg tablet Take 1,000 mg by mouth daily. Provider, Historical  
clonazePAM (KlonoPIN) 0.5 mg tablet Take 0.5 mg by mouth nightly.     Provider, Historical  
 liothyronine (CYTOMEL) 5 mcg tablet Take 2.5 mcg by mouth daily. Provider, Historical  
levothyroxine (SYNTHROID) 125 mcg tablet Take 125 mcg by mouth Daily (before breakfast). Provider, Historical  
nitroglycerin (NITROSTAT) 0.4 mg SL tablet 0.4 mg by SubLINGual route every five (5) minutes as needed for Chest Pain. Up to 3 doses. Provider, Historical  
 
 
 
 
ANTHROPOMETRICS:   
Ht Readings from Last 1 Encounters:  
10/30/20 5' 4\" (1.626 m) Wt Readings from Last 1 Encounters:  
11/17/20 80.2 kg (176 lb 12.8 oz) IBW:120 # +/- 10%  %IBW: 148 % +/- 10% BMI: 30.4 kg/M2 Category: obese Waist: 41.5 inches (as measured at intake) Reported Wt Hx: lost 10-15 lbs over the past year Reported Diet Hx: 
 
Rate Your Plate Score: 45 (Score 58-72: Making many healthy choices; 41-57: Some choices need improving 24-40: many choices need improving) 24 Hour Diet Recall Breakfast Tabula egg & cheese biscuit Lunch Dinner Ink361 6 inch tuna salad sub with cheese & vegetables, 1/2 snack bag of chips, 1 cookie Snacks celery Beverages Soda with breakfast, soda with dinner, sparkling water at home Brandon Santos reports a year ago cut back on soda and red meat. She eats 2 meals per day and at least one meal per day from a restaurant. She lists personal preferences, finances and lack of discipline as perceived barriers to change. Environmental/Social: Heladio Pereira lives with her  who recently experienced cardiac arrest and is also enrolled in rehab and in attendance for today's consult; her grown daughter lives with them, she is a strict vegan. NUTRITION INTERVENTION: 
Nutrition 60 minute one-on-one education & goal setting with Brandon Santos Reviewed with Brandon Santos relevant labs compared to ideals.  
 
Reviewed weight history and patient's verbalized weight goal as well as any real or perceived barriers to obtaining the goal. Collaborated with patient to set a specific short and long term weight goal.  
 
Reviewed Rate Your Plate and conducted a verbal diet recall. Assessed for environmental, financial, psychosocial, physical and comorbidities that may impact the food and eating patterns / behaviors of Navin Lora Collaborated with patient to set specific nutrient goals as well as specific food / behavior changes that will help patient meet the overall goal of following a heart healthy eating pattern (using guidelines as set forth by the American Heart Association and modeled after healthful eating patterns as recognized by the USDA Dietary Guidelines such as DASH, Mediterranean or plant-based). Briefly reviewed with Navin Paulino the nutrition information in the Cardiac Rehab patient education book and encouraged Navin Paulino to read thoroughly, ask questions as needed, and use for future reference for heart healthy nutrition information. Navin Paulino is not scheduled to participate in Cardiac Rehab group nutrition classes. PATIENT GOALS: 
 
- use provided nutrition facts to help guide healthier choices at restaurants 
- limit restaurants to no more than once per day 
- use a weekly meal plan, batch cook in advance, and stock up on healthier prepared meals (list provided) to help reduce reliance on fast food meals 
- find other activities to do for fun rather than rely on going out to eat for entertainment 
- eliminate sodas and drink water / sparkling water / fruit infused water instead - practice assertiveness; tell yourself \"not now\" vs \"not ever\" to help prevent feelings of deprivation that have sabotaged past efforts at healthier eating. Questions addressed. Follow-up plans discussed. Navin Paulino verbalized understanding.  
 
        Guillermo Black RD

## 2020-11-24 ENCOUNTER — HOSPITAL ENCOUNTER (OUTPATIENT)
Dept: CARDIAC REHAB | Age: 70
Discharge: HOME | End: 2020-11-24
Payer: MEDICARE

## 2020-11-24 VITALS — WEIGHT: 176.4 LBS | BODY MASS INDEX: 30.28 KG/M2

## 2020-11-24 PROCEDURE — 93798 PHYS/QHP OP CAR RHAB W/ECG: CPT

## 2020-12-01 ENCOUNTER — HOSPITAL ENCOUNTER (OUTPATIENT)
Dept: CARDIAC REHAB | Age: 70
Discharge: HOME | End: 2020-12-01
Payer: MEDICARE

## 2020-12-01 VITALS — BODY MASS INDEX: 30.31 KG/M2 | WEIGHT: 176.6 LBS

## 2020-12-01 PROCEDURE — 93798 PHYS/QHP OP CAR RHAB W/ECG: CPT

## 2020-12-04 ENCOUNTER — APPOINTMENT (OUTPATIENT)
Dept: CARDIAC REHAB | Age: 70
End: 2020-12-04
Payer: MEDICARE

## 2020-12-08 ENCOUNTER — HOSPITAL ENCOUNTER (OUTPATIENT)
Dept: CARDIAC REHAB | Age: 70
Discharge: HOME | End: 2020-12-08
Payer: MEDICARE

## 2020-12-08 VITALS — BODY MASS INDEX: 30.38 KG/M2 | WEIGHT: 177 LBS

## 2020-12-08 PROCEDURE — 93798 PHYS/QHP OP CAR RHAB W/ECG: CPT

## 2020-12-11 ENCOUNTER — HOSPITAL ENCOUNTER (OUTPATIENT)
Dept: CARDIAC REHAB | Age: 70
Discharge: HOME | End: 2020-12-11
Payer: MEDICARE

## 2020-12-11 VITALS — BODY MASS INDEX: 30.55 KG/M2 | WEIGHT: 178 LBS

## 2020-12-11 PROCEDURE — 93798 PHYS/QHP OP CAR RHAB W/ECG: CPT

## 2020-12-15 ENCOUNTER — HOSPITAL ENCOUNTER (OUTPATIENT)
Dept: CARDIAC REHAB | Age: 70
Discharge: HOME | End: 2020-12-15
Payer: MEDICARE

## 2020-12-15 VITALS — WEIGHT: 178.4 LBS | BODY MASS INDEX: 30.62 KG/M2

## 2020-12-15 PROCEDURE — 93798 PHYS/QHP OP CAR RHAB W/ECG: CPT

## 2020-12-18 ENCOUNTER — APPOINTMENT (OUTPATIENT)
Dept: CARDIAC REHAB | Age: 70
End: 2020-12-18
Payer: MEDICARE

## 2020-12-22 ENCOUNTER — APPOINTMENT (OUTPATIENT)
Dept: CARDIAC REHAB | Age: 70
End: 2020-12-22
Payer: MEDICARE

## 2020-12-29 ENCOUNTER — APPOINTMENT (OUTPATIENT)
Dept: CARDIAC REHAB | Age: 70
End: 2020-12-29
Payer: MEDICARE

## 2021-01-25 LAB
ALBUMIN SERPL-MCNC: 4.5 G/DL (ref 3.7–4.7)
ALBUMIN/GLOB SERPL: 2.1 {RATIO} (ref 1.2–2.2)
ALP SERPL-CCNC: 50 IU/L (ref 39–117)
ALT SERPL-CCNC: 29 IU/L (ref 0–32)
AST SERPL-CCNC: 35 IU/L (ref 0–40)
BILIRUB SERPL-MCNC: 0.7 MG/DL (ref 0–1.2)
BUN SERPL-MCNC: 13 MG/DL (ref 8–27)
BUN/CREAT SERPL: 12 (ref 12–28)
CALCIUM SERPL-MCNC: 10.2 MG/DL (ref 8.7–10.3)
CHLORIDE SERPL-SCNC: 106 MMOL/L (ref 96–106)
CHOLEST SERPL-MCNC: 184 MG/DL (ref 100–199)
CO2 SERPL-SCNC: 22 MMOL/L (ref 20–29)
CREAT SERPL-MCNC: 1.05 MG/DL (ref 0.57–1)
GLOBULIN SER CALC-MCNC: 2.1 G/DL (ref 1.5–4.5)
GLUCOSE SERPL-MCNC: 118 MG/DL (ref 65–99)
HDL SERPL-SCNC: 38.8 UMOL/L
HDLC SERPL-MCNC: 62 MG/DL
INTERPRETATION, 910389: NORMAL
INTERPRETATION: NORMAL
LDL SERPL QN: 20.8 NM
LDL SERPL-SCNC: 1369 NMOL/L
LDL SMALL SERPL-SCNC: 622 NMOL/L
LDLC SERPL CALC-MCNC: 94 MG/DL (ref 0–99)
LP-IR SCORE SERPL: 50
PDF IMAGE, 910387: NORMAL
POTASSIUM SERPL-SCNC: 3.9 MMOL/L (ref 3.5–5.2)
PROT SERPL-MCNC: 6.6 G/DL (ref 6–8.5)
SODIUM SERPL-SCNC: 142 MMOL/L (ref 134–144)
TRIGL SERPL-MCNC: 165 MG/DL (ref 0–149)

## 2021-01-28 ENCOUNTER — OFFICE VISIT (OUTPATIENT)
Dept: CARDIOLOGY CLINIC | Age: 71
End: 2021-01-28
Payer: MEDICARE

## 2021-01-28 VITALS
SYSTOLIC BLOOD PRESSURE: 140 MMHG | DIASTOLIC BLOOD PRESSURE: 76 MMHG | HEIGHT: 64 IN | BODY MASS INDEX: 30.22 KG/M2 | HEART RATE: 69 BPM | RESPIRATION RATE: 16 BRPM | OXYGEN SATURATION: 97 % | WEIGHT: 177 LBS

## 2021-01-28 DIAGNOSIS — I25.10 CORONARY ARTERY DISEASE INVOLVING NATIVE CORONARY ARTERY OF NATIVE HEART WITHOUT ANGINA PECTORIS: ICD-10-CM

## 2021-01-28 DIAGNOSIS — I48.0 PAF (PAROXYSMAL ATRIAL FIBRILLATION) (HCC): Primary | ICD-10-CM

## 2021-01-28 PROCEDURE — 99214 OFFICE O/P EST MOD 30 MIN: CPT | Performed by: SPECIALIST

## 2021-01-28 PROCEDURE — 3017F COLORECTAL CA SCREEN DOC REV: CPT | Performed by: SPECIALIST

## 2021-01-28 PROCEDURE — 1101F PT FALLS ASSESS-DOCD LE1/YR: CPT | Performed by: SPECIALIST

## 2021-01-28 PROCEDURE — G8753 SYS BP > OR = 140: HCPCS | Performed by: SPECIALIST

## 2021-01-28 PROCEDURE — G8754 DIAS BP LESS 90: HCPCS | Performed by: SPECIALIST

## 2021-01-28 PROCEDURE — 1090F PRES/ABSN URINE INCON ASSESS: CPT | Performed by: SPECIALIST

## 2021-01-28 PROCEDURE — G8432 DEP SCR NOT DOC, RNG: HCPCS | Performed by: SPECIALIST

## 2021-01-28 PROCEDURE — G8417 CALC BMI ABV UP PARAM F/U: HCPCS | Performed by: SPECIALIST

## 2021-01-28 PROCEDURE — G8427 DOCREV CUR MEDS BY ELIG CLIN: HCPCS | Performed by: SPECIALIST

## 2021-01-28 PROCEDURE — G8400 PT W/DXA NO RESULTS DOC: HCPCS | Performed by: SPECIALIST

## 2021-01-28 PROCEDURE — G8536 NO DOC ELDER MAL SCRN: HCPCS | Performed by: SPECIALIST

## 2021-01-28 RX ORDER — AMLODIPINE BESYLATE 2.5 MG/1
2.5 TABLET ORAL DAILY
Qty: 30 TAB | Refills: 12 | Status: SHIPPED | OUTPATIENT
Start: 2021-01-28 | End: 2021-02-23 | Stop reason: SDUPTHER

## 2021-01-28 NOTE — PROGRESS NOTES
Kalin Martinez MD. [facility]              Patient: Neema Doyle  : 1950      Today's Date: 2021          HISTORY OF PRESENT ILLNESS:     History of Present Illness:  She is here for follow-up. Has been HA's and neck/shoulder soreness - seeing Chiropractor. Has tight shoulders. Occasionally needs to take deep breaths. PAST MEDICAL HISTORY:     Past Medical History:   Diagnosis Date    CAD (coronary artery disease)     NSTEMI - PCI to LCX on 20     Dyslipidemia     Hypertension     Hypothyroid     [facility]STEMI (non-ST elevated [facility] infarction) ([facility] [address]     NSTEMI 20    PAF (paroxysmal atrial fibrillation) ([facility] [address]     Arrived with Afib with RVR 20 - converted to NSR spontaneously same day    Statin intolerance          Past Surgical History:   Procedure Laterality Date    HX CORONARY STENT PLACEMENT      HX GYN      tubal ligation    [place] CARDIAC SURG PROCEDURE UNLIST      PCI to Cx X 2 FAM           MEDICATIONS:     Current Outpatient Medications   Medication Sig Dispense Refill    rosuvastatin (CRESTOR) 5 mg tablet TAKE 1 TAB BY MOUTH EVERY  MONDAY, WEDNESDAY, FRIDAY. (Patient taking differently: Take 5 mg by mouth every Monday, Wednesday, Friday, Saturday.) 39 Tab 3    Eliquis 5 mg tablet TAKE 1 TABLET BY MOUTH  TWICE DAILY 180 Tab 3    Brilinta 90 mg tablet TAKE 1 TABLET BY MOUTH  EVERY TWELVE HOURS 180 Tab 3    metoprolol tartrate (LOPRESSOR) 25 mg tablet TAKE 1 TABLET BY MOUTH  EVERY 12 HOURS 180 Tab 3    ezetimibe (ZETIA) 10 mg tablet Take 1 Tab by mouth daily. 90 Tab 3    omega 3-DHA-EPA-fish oil 1,000 mg (120 mg-180 mg) capsule Take 3 Caps by mouth daily.  cholecalciferol (VITAMIN D3) (5000 Units/125 mcg) tab tablet Take 5,000 Units by mouth daily.  ascorbic acid, vitamin C, (Vitamin C) 1,000 mg tablet Take 1,000 mg by mouth daily.  clonazePAM (KlonoPIN) 0.5 mg tablet Take 0.5 mg by mouth nightly.       liothyronine (CYTOMEL) 5 mcg tablet Take 2.5 mcg by mouth daily.  levothyroxine (SYNTHROID) 125 mcg tablet Take 125 mcg by mouth Daily (before breakfast).  nitroglycerin (NITROSTAT) 0.4 mg SL tablet 0.4 mg by SubLINGual route every five (5) minutes as needed for Chest Pain. Up to 3 doses. Allergies   Allergen Reactions    Adhesive Rash    Neosporin Plus [Kvtyp-Wjrub-Bobvmbn-Pramoxine] Rash    Pcn [Penicillins] Rash     Childhood rash           SOCIAL HISTORY:     Social History     Tobacco Use    Smoking status: Never Smoker    Smokeless tobacco: Never Used   Substance Use Topics    Alcohol use: Not Currently    Drug use: Never         FAMILY HISTORY:     Family History   Problem Relation Age of Onset    Heart Disease Mother     Heart Disease Father             REVIEW OF SYMPTOMS:      Review of Symptoms:  Constitutional: Negative for fever, chills  HEENT: Negative for nosebleeds, tinnitus, and vision changes. Respiratory: Negative for cough, wheezing  Cardiovascular: Negative for orthopnea, claudication, syncope, and PND. Gastrointestinal: Negative for abdominal pain, diarrhea, melena. Genitourinary: Negative for dysuria  Musculoskeletal: Negative for myalgias. Skin: Negative for rash  Heme: No problems bleeding. Neurological: Negative for speech change and focal weakness.            PHYSICAL EXAM:      Physical Exam:  Visit Vitals  BP (!) 140/76 (BP 1 Location: Right arm, BP Patient Position: Sitting)   Pulse 69   Resp 16   Ht 5' 4\" (1.626 m)   Wt 177 lb (80.3 kg)   SpO2 97%   BMI 30.38 kg/m²          Patient appears generally well, mood and affect are appropriate and pleasant. HEENT:  Hearing intact, non-icteric, normocephalic, atraumatic. Neck Exam: Supple, No JVD or carotid bruits. Lung Exam: Clear to auscultation, even breath sounds. Cardiac Exam: Regular rate and rhythm with no murmur or rub  Abdomen: Soft, non-tender, normal bowel sounds. .  Extremities: Moves all ext well.  No lower extremity edema. Psych: Appropriate affect  Neuro - Grossly intact           LABS / OTHER STUDIES:      Lab Results   Component Value Date/Time    Sodium 142 01/21/2021 10:11 AM    Potassium 3.9 01/21/2021 10:11 AM    Chloride 106 01/21/2021 10:11 AM    CO2 22 01/21/2021 10:11 AM    Anion gap 9 06/20/2020 03:34 AM    Glucose 118 (H) 01/21/2021 10:11 AM    BUN 13 01/21/2021 10:11 AM    Creatinine 1.05 (H) 01/21/2021 10:11 AM    BUN/Creatinine ratio 12 01/21/2021 10:11 AM    GFR est AA 61 01/21/2021 10:11 AM    GFR est non-AA 53 (L) 01/21/2021 10:11 AM    Calcium 10.2 01/21/2021 10:11 AM    Bilirubin, total 0.7 01/21/2021 10:11 AM    Alk.  phosphatase 50 01/21/2021 10:11 AM    Protein, total 6.6 01/21/2021 10:11 AM    Albumin 4.5 01/21/2021 10:11 AM    Globulin 3.5 06/20/2020 03:34 AM    A-G Ratio 2.1 01/21/2021 10:11 AM    ALT (SGPT) 29 01/21/2021 10:11 AM    AST (SGOT) 35 01/21/2021 10:11 AM     Lab Results   Component Value Date/Time    WBC 3.8 09/08/2020 12:42 PM    HGB 14.4 09/08/2020 12:42 PM    HCT 43.8 09/08/2020 12:42 PM    PLATELET 646 89/05/8284 12:42 PM    MCV 93 09/08/2020 12:42 PM       Lab Results   Component Value Date/Time    Cholesterol, total 314 (H) 06/20/2020 03:34 AM    Cholesterol, Total 184 01/21/2021 10:11 AM    HDL Cholesterol 41 06/20/2020 03:34 AM    LDL, calculated 202.6 (H) 06/20/2020 03:34 AM    VLDL, calculated 70.4 06/20/2020 03:34 AM    Triglyceride 352 (H) 06/20/2020 03:34 AM    CHOL/HDL Ratio 7.7 (H) 06/20/2020 03:34 AM       Lab Results   Component Value Date/Time    TSH 2.32 06/19/2020 04:04 AM        Component      Latest Ref Rng & Units 1/21/2021 9/8/2020          10:11 AM 12:42 PM   LDL-P      <1,000 nmol/L 1,369 (H) 1,769 (H)   LDL-C(NIH CALC)      0 - 99 mg/dL 94 135 (H)   HDL-C      >39 mg/dL 62 61   Triglycerides      0 - 149 mg/dL 165 (H) 236 (H)   Cholesterol, total      100 - 199 mg/dL 184 238 (H)   HDL-P (Total)      >=30.5 umol/L 38.8 39.2   Small LDL-P      <=527 nmol/L 622 (H) 907 (H)   LDL size      >20.5 nm 20.8 20.9   LP-IR SCORE      <=45 50 (H) 60 (H)                CARDIAC DIAGNOSTICS:      Cardiac Evaluation Includes:     EKG 6/19/20 - Afib with RVR, , St depression V2-V4   EKG 6/19/20 later in day - sinus cynthia, otherwise normal         Stress test in 2020 at [facility] (Dr. Laurie Henning) - was OK per patient      Echo 6/19/20 - LVEF 50-55%, inferolateral and anterolateral HK, mild LAE, MAC and mild MR, AV calcification      Cardiac Cath 6/19/20 - LM 30%, LAD 30%, pLCX 100%,  RCA: subselective inj; MLI - not well visualized.  LVEDP: 9   --->  FAM 2.5 by 33 to LCX     Event Monitor 8/1/20 - 8/30 - normal         ASSESSMENT AND PLAN:      Assessment and Plan:  1) NSTEMI   - presented with CP 6/19/20 and was in afib with RVR  - Cardiac cath 6/19/20 ---> FAM to 100% pLCX stenosis    - See below regarding statin   - Continue BB  - She is doing well without further chest pain   - In cardiac rehab  - Continue Anti-platelet for one year s/p MI - - cont OAC   - check an echo next visit      2) PAF  - Arrived with Afib with RVR 6/19/20 in setting of NSTEMI - converted to NSR spontaneously same day  - Continue BB  - Event monitor was normal 8/1/20-8/30/20   - On 10/20 -  Will continue [address] for now (although she does not like taking it)--- Since Afib came in a setting of acute MI and then resolved spontaneously and her FU event monitor was normal, will consider stopping [address] eventually (maybe consider ILR in order to do so safely) ------> She saw Dr. Presley Santacruz and was considering an ILR but wants to think about it some more      3) Dyslipidemia   - LDL was 202 on 6/20/20   - she has been intolerant of multiple statins (muscle pain)   - On 6/20/20 started Crestor 5 mg three days a week.    - Added Zetia 7/2020  - On 10/20 - lipids still high ---> Discussed Repatha ---> she wants to work on diet before considering taking this   - On 1/28/21 - Lipids better but LDL-P still 1369 - she has some shoulder / neck pain -- she can hold a statin for a month and see if pain gets better. I again discussed Christine Dixon but she does not want to do that.       4) HTN  - BP still a little high   - she was unable to tolerate losartan (made her \"sick\")   - will have her try amlodipine 2.5 mg and cont metoprolol - follow BP at home and can increase over the phone     5) Neck / shoulder pain / HA  - seeing a chiropractor      6) See me in June 2021   had a cardiac arrest 6/30/20 and then went to [place] (she did CPR).          Eloy Cross MD, [address], [address]      [id number] [facility] [address], [address] [place] [address]  Ph: [phone]                               [phone]         ADDENDUM   2/1/2021  Spoke to patient. Will switch from Brilinta to Plavix 75 mg once daily (for lower bleeding risk).

## 2021-01-28 NOTE — PROGRESS NOTES
Identified pt with two pt identifiers(name and ). Reviewed record in preparation for visit and have obtained necessary documentation. Chief Complaint   Patient presents with    Follow-up           Visit Vitals  BP (!) 140/76 (BP 1 Location: Right arm, BP Patient Position: Sitting)   Pulse 69   Resp 16   Ht 5' 4\" (1.626 m)   Wt 177 lb (80.3 kg)   SpO2 97%   BMI 30.38 kg/m²     Pain Scale: /10    Coordination of Care Questionnaire:  :   1. Have you been to the ER, urgent care clinic since your last visit? Hospitalized since your last visit? No    2. Have you seen or consulted any other health care providers outside of the [address] [place] [address] since your last visit? Include any pap smears or colon screening.  Yes PCP, Chiropractor

## 2021-01-29 ENCOUNTER — HOSPITAL ENCOUNTER (OUTPATIENT)
Dept: GENERAL RADIOLOGY | Age: 71
Discharge: HOME | End: 2021-01-29
Payer: MEDICARE

## 2021-01-29 ENCOUNTER — TRANSCRIBE ORDER (OUTPATIENT)
Dept: REGISTRATION | Age: 71
End: 2021-01-29

## 2021-01-29 DIAGNOSIS — M54.2 NECK PAIN: ICD-10-CM

## 2021-01-29 DIAGNOSIS — M54.2 NECK PAIN: Primary | ICD-10-CM

## 2021-01-29 PROCEDURE — 72050 X-RAY EXAM NECK SPINE 4/5VWS: CPT

## 2021-02-01 RX ORDER — CLOPIDOGREL BISULFATE 75 MG/1
75 TABLET ORAL DAILY
Qty: 90 TAB | Refills: 3 | Status: SHIPPED | OUTPATIENT
Start: 2021-02-01 | End: 2021-03-31

## 2021-02-23 RX ORDER — AMLODIPINE BESYLATE 2.5 MG/1
2.5 TABLET ORAL DAILY
Qty: 90 TAB | Refills: 3 | Status: SHIPPED | OUTPATIENT
Start: 2021-02-23 | End: 2022-03-03

## 2021-02-23 NOTE — TELEPHONE ENCOUNTER
Refill per VO of Dr. Yamile Reyest:  Last appt: 1/28/2021  Future Appointments   Date Time Provider Jacqueline Huynh   6/15/2021 10:20 AM Lazarus Points, MD [facility] AMB       Requested Prescriptions     Signed Prescriptions Disp Refills    amLODIPine (NORVASC) 2.5 mg tablet 90 Tab 3     Sig: Take 1 Tab by mouth daily.      Authorizing Provider: Bayron Whiting     Ordering User: Sidney Nguyen

## 2021-03-28 ENCOUNTER — HOSPITAL ENCOUNTER (EMERGENCY)
Age: 71
Discharge: HOME | End: 2021-03-28
Attending: EMERGENCY MEDICINE
Payer: MEDICARE

## 2021-03-28 ENCOUNTER — APPOINTMENT (OUTPATIENT)
Dept: GENERAL RADIOLOGY | Age: 71
End: 2021-03-28
Attending: EMERGENCY MEDICINE
Payer: MEDICARE

## 2021-03-28 VITALS
OXYGEN SATURATION: 94 % | SYSTOLIC BLOOD PRESSURE: 137 MMHG | HEART RATE: 88 BPM | RESPIRATION RATE: 12 BRPM | BODY MASS INDEX: 29.37 KG/M2 | WEIGHT: 172 LBS | DIASTOLIC BLOOD PRESSURE: 68 MMHG | TEMPERATURE: 97.9 F | HEIGHT: 64 IN

## 2021-03-28 DIAGNOSIS — I48.0 PAF (PAROXYSMAL ATRIAL FIBRILLATION) (HCC): Primary | ICD-10-CM

## 2021-03-28 LAB
ALBUMIN SERPL-MCNC: 3.6 G/DL (ref 3.5–5)
ALBUMIN/GLOB SERPL: 1 {RATIO} (ref 1.1–2.2)
ALP SERPL-CCNC: 80 U/L (ref 45–117)
ALT SERPL-CCNC: 42 U/L (ref 12–78)
ANION GAP SERPL CALC-SCNC: 9 MMOL/L (ref 5–15)
APTT PPP: 22.5 SEC (ref 22.1–31)
AST SERPL-CCNC: 28 U/L (ref 15–37)
BASOPHILS # BLD: 0 K/UL (ref 0–0.1)
BASOPHILS NFR BLD: 1 % (ref 0–1)
BILIRUB SERPL-MCNC: 0.4 MG/DL (ref 0.2–1)
BNP SERPL-MCNC: 162 PG/ML
BUN SERPL-MCNC: 16 MG/DL (ref 6–20)
BUN/CREAT SERPL: 16 (ref 12–20)
CALCIUM SERPL-MCNC: 9 MG/DL (ref 8.5–10.1)
CHLORIDE SERPL-SCNC: 107 MMOL/L (ref 97–108)
CK SERPL-CCNC: 121 U/L (ref 26–192)
CO2 SERPL-SCNC: 24 MMOL/L (ref 21–32)
COMMENT, HOLDF: NORMAL
CREAT SERPL-MCNC: 1.01 MG/DL (ref 0.55–1.02)
DIFFERENTIAL METHOD BLD: NORMAL
EOSINOPHIL # BLD: 0.1 K/UL (ref 0–0.4)
EOSINOPHIL NFR BLD: 3 % (ref 0–7)
ERYTHROCYTE [DISTWIDTH] IN BLOOD BY AUTOMATED COUNT: 12.3 % (ref 11.5–14.5)
GLOBULIN SER CALC-MCNC: 3.6 G/DL (ref 2–4)
GLUCOSE SERPL-MCNC: 161 MG/DL (ref 65–100)
HCT VFR BLD AUTO: 37.3 % (ref 35–47)
HGB BLD-MCNC: 12.6 G/DL (ref 11.5–16)
IMM GRANULOCYTES # BLD AUTO: 0 K/UL (ref 0–0.04)
IMM GRANULOCYTES NFR BLD AUTO: 0 % (ref 0–0.5)
INR PPP: 1 (ref 0.9–1.1)
LYMPHOCYTES # BLD: 1.2 K/UL (ref 0.8–3.5)
LYMPHOCYTES NFR BLD: 28 % (ref 12–49)
MAGNESIUM SERPL-MCNC: 1.7 MG/DL (ref 1.6–2.4)
MCH RBC QN AUTO: 29.7 PG (ref 26–34)
MCHC RBC AUTO-ENTMCNC: 33.8 G/DL (ref 30–36.5)
MCV RBC AUTO: 88 FL (ref 80–99)
MONOCYTES # BLD: 0.4 K/UL (ref 0–1)
MONOCYTES NFR BLD: 9 % (ref 5–13)
NEUTS SEG # BLD: 2.6 K/UL (ref 1.8–8)
NEUTS SEG NFR BLD: 59 % (ref 32–75)
NRBC # BLD: 0 K/UL (ref 0–0.01)
NRBC BLD-RTO: 0 PER 100 WBC
PHOSPHATE SERPL-MCNC: 3 MG/DL (ref 2.6–4.7)
PLATELET # BLD AUTO: 200 K/UL (ref 150–400)
PMV BLD AUTO: 9.8 FL (ref 8.9–12.9)
POTASSIUM SERPL-SCNC: 3.3 MMOL/L (ref 3.5–5.1)
PROT SERPL-MCNC: 7.2 G/DL (ref 6.4–8.2)
PROTHROMBIN TIME: 10.3 SEC (ref 9–11.1)
RBC # BLD AUTO: 4.24 M/UL (ref 3.8–5.2)
SAMPLES BEING HELD,HOLD: NORMAL
SODIUM SERPL-SCNC: 140 MMOL/L (ref 136–145)
THERAPEUTIC RANGE,PTTT: NORMAL SECS (ref 58–77)
TROPONIN I SERPL-MCNC: <0.05 NG/ML
WBC # BLD AUTO: 4.4 K/UL (ref 3.6–11)

## 2021-03-28 PROCEDURE — 82550 ASSAY OF CK (CPK): CPT

## 2021-03-28 PROCEDURE — 96374 THER/PROPH/DIAG INJ IV PUSH: CPT

## 2021-03-28 PROCEDURE — 84484 ASSAY OF TROPONIN QUANT: CPT

## 2021-03-28 PROCEDURE — 85610 PROTHROMBIN TIME: CPT

## 2021-03-28 PROCEDURE — 83880 ASSAY OF NATRIURETIC PEPTIDE: CPT

## 2021-03-28 PROCEDURE — 83735 ASSAY OF MAGNESIUM: CPT

## 2021-03-28 PROCEDURE — 84100 ASSAY OF PHOSPHORUS: CPT

## 2021-03-28 PROCEDURE — 99285 EMERGENCY DEPT VISIT HI MDM: CPT

## 2021-03-28 PROCEDURE — 93005 ELECTROCARDIOGRAM TRACING: CPT

## 2021-03-28 PROCEDURE — 36415 COLL VENOUS BLD VENIPUNCTURE: CPT

## 2021-03-28 PROCEDURE — 85025 COMPLETE CBC W/AUTO DIFF WBC: CPT

## 2021-03-28 PROCEDURE — 74011250636 HC RX REV CODE- 250/636: Performed by: EMERGENCY MEDICINE

## 2021-03-28 PROCEDURE — 85730 THROMBOPLASTIN TIME PARTIAL: CPT

## 2021-03-28 PROCEDURE — 80053 COMPREHEN METABOLIC PANEL: CPT

## 2021-03-28 PROCEDURE — 71045 X-RAY EXAM CHEST 1 VIEW: CPT

## 2021-03-28 RX ORDER — ADENOSINE 3 MG/ML
12 INJECTION, SOLUTION INTRAVENOUS ONCE
Status: DISCONTINUED | OUTPATIENT
Start: 2021-03-28 | End: 2021-03-29 | Stop reason: HOSPADM

## 2021-03-28 RX ORDER — ADENOSINE 3 MG/ML
6 INJECTION, SOLUTION INTRAVENOUS
Status: COMPLETED | OUTPATIENT
Start: 2021-03-28 | End: 2021-03-28

## 2021-03-28 RX ADMIN — ADENOSINE 6 MG: 3 INJECTION INTRAVENOUS at 22:44

## 2021-03-29 LAB
ATRIAL RATE: 144 BPM
ATRIAL RATE: 90 BPM
CALCULATED P AXIS, ECG09: 40 DEGREES
CALCULATED R AXIS, ECG10: 1 DEGREES
CALCULATED R AXIS, ECG10: 3 DEGREES
CALCULATED T AXIS, ECG11: -115 DEGREES
CALCULATED T AXIS, ECG11: 9 DEGREES
DIAGNOSIS, 93000: NORMAL
DIAGNOSIS, 93000: NORMAL
P-R INTERVAL, ECG05: 156 MS
Q-T INTERVAL, ECG07: 278 MS
Q-T INTERVAL, ECG07: 372 MS
QRS DURATION, ECG06: 70 MS
QRS DURATION, ECG06: 74 MS
QTC CALCULATION (BEZET), ECG08: 425 MS
QTC CALCULATION (BEZET), ECG08: 455 MS
VENTRICULAR RATE, ECG03: 141 BPM
VENTRICULAR RATE, ECG03: 90 BPM

## 2021-03-29 NOTE — ED TRIAGE NOTES
Pt ambulatory to triage for left sided chest \"sensation\". States she took 2 NTG and did did not resolve. States she was dizzy and broke out in sweat.  HR in 140's in triage

## 2021-03-29 NOTE — ED PROVIDER NOTES
The patient is a 68-year-old female with a past medical history significant for coronary disease, dyslipidemia, hypertension, hypothyroidism, paroxysmal atrial fibrillation who converted spontaneously who presents to the ED with a complaint of chest pain described as pressure and palpitation and feeling as if her heart was beating fast and irregularly. The patient took 2 sublingual nitroglycerin without any significant relief of symptom then decided come to the ER for further evaluation. She is currently pain-free and comfortable. She denies any headache, neck and back pain, sore throat, cough or congestion, chest pain shortness of air with exertion, nausea, vomiting, diarrhea, constipation, dysuria, dizziness, extremity weakness or numbness, sick contact, skin rash, recent travel. Past Medical History:   Diagnosis Date    CAD (coronary artery disease)     NSTEMI - PCI to LCX on 6/19/20     Dyslipidemia     Hypertension     Hypothyroid     NSTEMI (non-ST elevated myocardial infarction) [address] [address]     NSTEMI 6/19/20    PAF (paroxysmal atrial fibrillation) ([facility] [address]     Arrived with Afib with RVR 6/19/20 - converted to NSR spontaneously same day    Statin intolerance        Past Surgical History:   Procedure Laterality Date    HX CORONARY STENT PLACEMENT      HX GYN      tubal ligation    [place] CARDIAC SURG PROCEDURE UNLIST      PCI to Cx X 2 FAM         Family History:   Problem Relation Age of Onset    Heart Disease Mother     Heart Disease Father        Social History     Socioeconomic History    Marital status:      Spouse name: Selina Mendoza Number of children: 2    Years of education: 12    Highest education level:  Bachelor's degree (e.g., BA, AB, BS)   Occupational History    Not on file   Social Needs    Financial resource strain: Not very hard    Food insecurity     Worry: Never true     Inability: Never true    Transportation needs     Medical: No     Non-medical: No   Tobacco Use  Smoking status: Never Smoker    Smokeless tobacco: Never Used   Substance and Sexual Activity    Alcohol use: Not Currently    Drug use: Never    Sexual activity: Not Currently   Lifestyle    Physical activity     Days per week: 0 days     Minutes per session: 0 min    Stress: Only a little   Relationships    Social connections     Talks on phone: More than three times a week     Gets together: Once a week     Attends Scientologist service: Never     Active member of club or organization: No     Attends meetings of clubs or organizations: Never     Relationship status:     Intimate partner violence     Fear of current or ex partner: No     Emotionally abused: No     Physically abused: No     Forced sexual activity: No   Other Topics Concern     Service No    Blood Transfusions No    Caffeine Concern No    Occupational Exposure No    Hobby Hazards No    Sleep Concern No    Stress Concern No    Weight Concern No    Special Diet No    Back Care No    Exercise No    Bike Helmet No    Seat Belt No    Self-Exams No   Social History Narrative    Not on file         ALLERGIES: Adhesive, Neosporin plus [tlgqu-ybhpj-dgzifgo-pramoxine], and Pcn [penicillins]    Review of Systems   All other systems reviewed and are negative. Vitals:    03/28/21 2215 03/28/21 2230 03/28/21 2244 03/28/21 2245   BP: (!) 163/92 (!) 145/95 (!) 145/95 114/83   Pulse: (!) 139 (!) 149 94 95   Resp: 23 26  21   Temp:       SpO2: 96% 96%  99%   Weight:       Height:                Physical Exam  Vitals signs and nursing note reviewed. CONSTITUTIONAL: Well-appearing; well-nourished; in no apparent distress  HEAD: Normocephalic; atraumatic  EYES: PERRL; EOM intact; conjunctiva and sclera are clear bilaterally. ENT: No rhinorrhea; normal pharynx with no tonsillar hypertrophy; mucous membranes pink/moist, no erythema, no exudate.   NECK: Supple; non-tender; no cervical lymphadenopathy  CARD: Normal S1, S2; no murmurs, rubs, or gallops. Irregularly irregular rate and rhythm. RESP: Normal respiratory effort; breath sounds clear and equal bilaterally; no wheezes, rhonchi, or rales. ABD: Normal bowel sounds; non-distended; non-tender; no palpable organomegaly, no masses, no bruits. Back Exam: Normal inspection; no vertebral point tenderness, no CVA tenderness. Normal range of motion. EXT: Normal ROM in all four extremities; non-tender to palpation; no swelling or deformity; distal pulses are normal, no edema. SKIN: Warm; dry; no rash. NEURO:Alert and oriented x 3, coherent, ASHLEY-XII grossly intact, sensory and motor are non-focal.        MDM  Number of Diagnoses or Management Options  PAF (paroxysmal atrial fibrillation) ([facility] [address]  Diagnosis management comments: Assessment: 25-year-old female who presented with chest pain and palpitations suspect paroxysmal atrial fibrillation. The patient is hemodynamically stable and well. Plan: EKG/lab/chest x-ray/serial exam/carotid massage and equal successful adenosine/serial EKG/serial exam/  monitor and Reevaluate. Amount and/or Complexity of Data Reviewed  Clinical lab tests: ordered and reviewed  Tests in the radiology section of CPT®: reviewed and ordered  Tests in the medicine section of CPT®: reviewed and ordered  Discussion of test results with the performing providers: yes  Decide to obtain previous medical records or to obtain history from someone other than the patient: yes  Obtain history from someone other than the patient: yes  Review and summarize past medical records: yes  Discuss the patient with other providers: yes  Independent visualization of images, tracings, or specimens: yes    Risk of Complications, Morbidity, and/or Mortality  Presenting problems: moderate  Diagnostic procedures: moderate  Management options: moderate  General comments:  Total critical care time spent exclusive of procedures:  45 minutes           Procedures    ED EKG interpretation:  Rhythm: atrial fib; and irregular. Rate (approx.): 141; Axis: left axis deviation; QRS interval: normal ; ST/T wave: non-specific changes; in  Lead: Diffusely; Other findings: abnormal ekg. This EKG was interpreted by Leatha De La Rosa MD,ED Provider. XRAY INTERPRETATION (ED MD)  Chest Xray  No acute process seen. Normal heart size. No bony abnormalities. No infiltrate. Verito White MD 10:57 PM      Repeat ED EKG interpretation:  Rhythm: normal sinus rhythm; and regular . Rate (approx.): 90; Axis: left axis deviation; P wave: normal; QRS interval: normal ; ST/T wave: normal; in  Lead: Diffusely; Other findings: abnormal ekg. This EKG was interpreted by Leatha De La Rosa MD,ED Provider. 22:53    Progress Note:   Pt has been reexamined by Leatha De La Rosa MD. Pt is feeling much better. Symptoms have improved. After attempted carotid massage without any success, the patient was given 6 mg of adenosine and she converted. all available results have been reviewed with pt and any available family. Pt understands sx, dx, and tx in ED. Care plan has been outlined and questions have been answered. Pt is ready to go home. Will send home on atrial fibrillation instruction. Outpatient referral with PCP/cardiology for reevaluation and further treatment as needed. Written by Leatha De La Rosa MD,11:10 PM    .   .

## 2021-03-31 ENCOUNTER — OFFICE VISIT (OUTPATIENT)
Dept: CARDIOLOGY CLINIC | Age: 71
End: 2021-03-31
Payer: MEDICARE

## 2021-03-31 VITALS
HEART RATE: 78 BPM | WEIGHT: 173 LBS | BODY MASS INDEX: 29.53 KG/M2 | SYSTOLIC BLOOD PRESSURE: 152 MMHG | OXYGEN SATURATION: 98 % | HEIGHT: 64 IN | DIASTOLIC BLOOD PRESSURE: 88 MMHG

## 2021-03-31 DIAGNOSIS — E78.5 DYSLIPIDEMIA: ICD-10-CM

## 2021-03-31 DIAGNOSIS — I48.0 PAF (PAROXYSMAL ATRIAL FIBRILLATION) (HCC): Primary | ICD-10-CM

## 2021-03-31 DIAGNOSIS — I21.4 NSTEMI (NON-ST ELEVATED MYOCARDIAL INFARCTION) (HCC): ICD-10-CM

## 2021-03-31 PROCEDURE — G8427 DOCREV CUR MEDS BY ELIG CLIN: HCPCS | Performed by: SPECIALIST

## 2021-03-31 PROCEDURE — G8754 DIAS BP LESS 90: HCPCS | Performed by: SPECIALIST

## 2021-03-31 PROCEDURE — G8400 PT W/DXA NO RESULTS DOC: HCPCS | Performed by: SPECIALIST

## 2021-03-31 PROCEDURE — G8536 NO DOC ELDER MAL SCRN: HCPCS | Performed by: SPECIALIST

## 2021-03-31 PROCEDURE — G8417 CALC BMI ABV UP PARAM F/U: HCPCS | Performed by: SPECIALIST

## 2021-03-31 PROCEDURE — G8753 SYS BP > OR = 140: HCPCS | Performed by: SPECIALIST

## 2021-03-31 PROCEDURE — 1090F PRES/ABSN URINE INCON ASSESS: CPT | Performed by: SPECIALIST

## 2021-03-31 PROCEDURE — 99215 OFFICE O/P EST HI 40 MIN: CPT | Performed by: SPECIALIST

## 2021-03-31 PROCEDURE — G8432 DEP SCR NOT DOC, RNG: HCPCS | Performed by: SPECIALIST

## 2021-03-31 PROCEDURE — 1101F PT FALLS ASSESS-DOCD LE1/YR: CPT | Performed by: SPECIALIST

## 2021-03-31 PROCEDURE — 3017F COLORECTAL CA SCREEN DOC REV: CPT | Performed by: SPECIALIST

## 2021-03-31 RX ORDER — TICAGRELOR 90 MG/1
TABLET ORAL 2 TIMES DAILY
Status: ON HOLD | COMMUNITY
End: 2021-05-12

## 2021-03-31 RX ORDER — METOPROLOL TARTRATE 50 MG/1
50 TABLET ORAL 2 TIMES DAILY
Qty: 180 TAB | Refills: 3 | Status: SHIPPED | OUTPATIENT
Start: 2021-03-31 | End: 2022-05-03

## 2021-03-31 RX ORDER — CYCLOBENZAPRINE HCL 10 MG
TABLET ORAL
COMMUNITY

## 2021-03-31 NOTE — PROGRESS NOTES
Francisco Hernandez MD. [facility] [place]              Patient: Mireya Amaya  : 1950      Today's Date: 3/31/2021            HISTORY OF PRESENT ILLNESS:     History of Present Illness:    Was in ED a couple of days ago - Dr. Juve Messer noted \"presents to the ED with a complaint of chest pain described as pressure and palpitation and feeling as if her heart was beating fast and irregularly. The patient took 2 sublingual nitroglycerin without any significant relief of symptom then decided come to the ER for further evaluation. She is currently pain-free and comfortable. \" --> EKG showed Afib with RVR, rate 141 --> she was given adenosine and apparently converted to sinus. She feels great today. No CP or SOB now. She is back to baseline. PAST MEDICAL HISTORY:     Past Medical History:   Diagnosis Date    CAD (coronary artery disease)     NSTEMI - PCI to LCX on 20     Dyslipidemia     Hypertension     Hypothyroid     NSTEMI (non-ST elevated myocardial infarction) ([facility] [address]     NSTEMI 20    PAF (paroxysmal atrial fibrillation) ([facility] [address]     Arrived with Afib with RVR 20 - converted to NSR spontaneously same day    Statin intolerance          Past Surgical History:   Procedure Laterality Date    HX CORONARY STENT PLACEMENT      HX GYN      tubal ligation    FL CARDIAC SURG PROCEDURE UNLIST      PCI to Cx X 2 FAM           MEDICATIONS:     Current Outpatient Medications   Medication Sig Dispense Refill    ticagrelor (Brilinta) 90 mg tablet Take  by mouth two (2) times a day.  cyclobenzaprine (FLEXERIL) 10 mg tablet Take  by mouth three (3) times daily as needed for Muscle Spasm(s).  amLODIPine (NORVASC) 2.5 mg tablet Take 1 Tab by mouth daily. 90 Tab 3    rosuvastatin (CRESTOR) 5 mg tablet TAKE 1 TAB BY MOUTH EVERY  MONDAY, WEDNESDAY, FRIDAY.  (Patient taking differently: Take 5 mg by mouth every Monday, Wednesday, Friday, Saturday.) 39 Tab 3    Eliquis 5 mg tablet TAKE 1 TABLET BY MOUTH  TWICE DAILY 180 Tab 3    metoprolol tartrate (LOPRESSOR) 25 mg tablet TAKE 1 TABLET BY MOUTH  EVERY 12 HOURS 180 Tab 3    ezetimibe (ZETIA) 10 mg tablet Take 1 Tab by mouth daily. 90 Tab 3    omega 3-DHA-EPA-fish oil 1,000 mg (120 mg-180 mg) capsule Take 3 Caps by mouth daily.  cholecalciferol (VITAMIN D3) (5000 Units/125 mcg) tab tablet Take 5,000 Units by mouth daily.  ascorbic acid, vitamin C, (Vitamin C) 1,000 mg tablet Take 1,000 mg by mouth daily.  liothyronine (CYTOMEL) 5 mcg tablet Take 2.5 mcg by mouth daily.  levothyroxine (SYNTHROID) 125 mcg tablet Take 125 mcg by mouth Daily (before breakfast).  nitroglycerin (NITROSTAT) 0.4 mg SL tablet 0.4 mg by SubLINGual route every five (5) minutes as needed for Chest Pain. Up to 3 doses. Allergies   Allergen Reactions    Adhesive Rash    Neosporin Plus [Ucfin-Jrlur-Wwywwqh-Pramoxine] Rash    Pcn [Penicillins] Rash     Childhood rash             SOCIAL HISTORY:     Social History     Tobacco Use    Smoking status: Never Smoker    Smokeless tobacco: Never Used   Substance Use Topics    Alcohol use: Not Currently    Drug use: Never         FAMILY HISTORY:     Family History   Problem Relation Age of Onset    Heart Disease Mother     Heart Disease Father                REVIEW OF SYMPTOMS:      Review of Symptoms:  Constitutional: Negative for fever, chills  HEENT: Negative for nosebleeds, tinnitus, and vision changes. Respiratory: Negative for cough, wheezing  Cardiovascular: Negative for orthopnea, claudication, syncope, and PND. Gastrointestinal: Negative for abdominal pain, diarrhea, melena. Genitourinary: Negative for dysuria  Musculoskeletal: Negative for myalgias. Skin: Negative for rash  Heme: No problems bleeding.   Neurological: Negative for speech change and focal weakness.            PHYSICAL EXAM:      Physical Exam:  Visit Vitals  BP (!) 152/88 (BP 1 Location: Left upper arm, BP Patient Position: Sitting, BP Cuff Size: Adult)   Pulse 78   Ht 5' 4\" (1.626 m)   Wt 173 lb (78.5 kg)   SpO2 98%   BMI 29.70 kg/m²          Patient appears generally well, mood and affect are appropriate and pleasant. HEENT:  Hearing intact, non-icteric, normocephalic, atraumatic. Neck Exam: Supple, No JVD or carotid bruits. Lung Exam: Clear to auscultation, even breath sounds. Cardiac Exam: Regular rate and rhythm with no murmur or rub  Abdomen: Soft, non-tender, normal bowel sounds. .  Extremities: Moves all ext well. No lower extremity edema. Psych: Appropriate affect  Neuro - Grossly intact           LABS / OTHER STUDIES:      Lab Results   Component Value Date/Time    Sodium 140 03/28/2021 10:23 PM    Potassium 3.3 (L) 03/28/2021 10:23 PM    Chloride 107 03/28/2021 10:23 PM    CO2 24 03/28/2021 10:23 PM    Anion gap 9 03/28/2021 10:23 PM    Glucose 161 (H) 03/28/2021 10:23 PM    BUN 16 03/28/2021 10:23 PM    Creatinine 1.01 03/28/2021 10:23 PM    BUN/Creatinine ratio 16 03/28/2021 10:23 PM    GFR est AA >60 03/28/2021 10:23 PM    GFR est non-AA 54 (L) 03/28/2021 10:23 PM    Calcium 9.0 03/28/2021 10:23 PM    Bilirubin, total 0.4 03/28/2021 10:23 PM    Alk.  phosphatase 80 03/28/2021 10:23 PM    Protein, total 7.2 03/28/2021 10:23 PM    Albumin 3.6 03/28/2021 10:23 PM    Globulin 3.6 03/28/2021 10:23 PM    A-G Ratio 1.0 (L) 03/28/2021 10:23 PM    ALT (SGPT) 42 03/28/2021 10:23 PM    AST (SGOT) 28 03/28/2021 10:23 PM     Lab Results   Component Value Date/Time    WBC 4.4 03/28/2021 10:23 PM    HGB 12.6 03/28/2021 10:23 PM    HCT 37.3 03/28/2021 10:23 PM    PLATELET 975 03/04/5058 10:23 PM    MCV 88.0 03/28/2021 10:23 PM     Lab Results   Component Value Date/Time    TSH 2.32 06/19/2020 04:04 AM     Component      Latest Ref Rng & Units 9/8/2020          12:42 PM   GLOBULIN, TOTAL      1.5 - 4.5 g/dL    LDL-P      <1,000 nmol/L 1,769 (H)   LDL-C(NIH CALC)      0 - 99 mg/dL 135 (H)   HDL-C      >39 mg/dL 61 Triglycerides      0 - 149 mg/dL 236 (H)   Cholesterol, total      100 - 199 mg/dL 238 (H)   HDL-P (Total)      >=30.5 umol/L 39.2   Small LDL-P      <=527 nmol/L 907 (H)   LDL size      >20.5 nm 20.9   LP-IR SCORE      <=45 60 (H)     Lab Results   Component Value Date/Time    TSH 2.32 06/19/2020 04:04 AM           CARDIAC DIAGNOSTICS:      Cardiac Evaluation Includes:     EKG 6/19/20 - Afib with RVR, , St depression V2-V4   EKG 6/19/20 later in day - sinus cynthia, otherwise normal   EKG 3/28/21 - Afib with RVR, , NSST changes   EKG 3/28/21 # 2 - NSR, possible LAE         Stress test in 2020 at [facility] (Dr. Lizette Bray) - was OK per patient      Echo 6/19/20 - LVEF 50-55%, inferolateral and anterolateral HK, mild LAE, MAC and mild MR, AV calcification      Cardiac Cath 6/19/20 - LM 30%, LAD 30%, pLCX 100%,  RCA: subselective inj; MLI - not well visualized.  LVEDP: 9   --->  FAM 2.5 by 33 to LCX     Event Monitor 8/1/20 - 8/30 - normal         ASSESSMENT AND PLAN:      Assessment and Plan:  1) CAD  - presented with CP 6/19/20 and was in afib with RVR and ruled in for NSTEMI   - Cardiac cath 6/19/20 ---> FAM to 100% pLCX stenosis    - See below regarding statin   - Continue BB  - She is doing well without further chest pain (no exertional CP)   - Continue Anti-platelet for one year s/p MI with [facility]--- plan to stop anti-platelet in June 1193 and then continue Eliuqis 5 mg BID alone   - check an echo next visit      2) PAF  - Arrived with Afib with RVR 6/19/20 in setting of NSTEMI - converted to NSR spontaneously same day  - Had another ED visit on 3/28/21 with Afib with RVR - converted to sinus in ED  - Continue Eliquis 5 mg BID long term   --->  Increase Metoprolol to 50 mg BID for tighter HR control     3) Dyslipidemia   - LDL was 202 on 6/20/20   - she has been intolerant of multiple statins (muscle pain)   - On 6/20/20 started Crestor 5 mg three days a week.    - Added Zetia 7/2020  - On 10/20 - lipids still high ---> Discussed Repatha ---> she wants to work on diet before considering taking this   - On 1/28/21 - Lipids better but LDL-P still 1369 - she has some shoulder / neck pain -- she can hold a statin for a month and see if pain gets better. I again discussed Chelsey Greene but she does not want to do that. - On 3/31/21 - holding statin did not help her neck pain (she had a pinched nerve)----> she will resume crestor 5 mg three days a week (she agrees reluctantly)      4) HTN  - BP still a little high   - she was unable to tolerate losartan (made her \"sick\")   --> increase metoprolol as above      5) Neck / shoulder pain / HA  - seeing a chiropractor and doing better      6) See me in June 2021 with an echo    had a cardiac arrest 6/30/20 and then went to [place] (she did CPR).         Total time (preparing to see the patient, reviewing data, seeing patient face to fine, counseling patient, documenting information, etc) was 39  min.     Carlos Yuen MD, [address] [place], [address]      [id number] [address]  [address]  [place], [address]  Ph: [phone]                               [phone]

## 2021-03-31 NOTE — PROGRESS NOTES
Delaney Katz is a 66 y.o. female    Visit Vitals  Ht 5' 4\" (1.626 m)   Wt 173 lb (78.5 kg)   BMI 29.70 kg/m²       Chief Complaint   Patient presents with    Other     NSTEMI    Other     AFIB    Hypertension    Cholesterol Problem    Irregular Heart Beat     PAF    Other     DLD       Chest pain NO  SOB NO  Dizziness NO  Swelling NO  Recent hospital visit NO  Refills NO    UNABLE TO FINE NITRIC BALANCE AND REPAIRVITE IN DATA BASE. PT IS TAKING.

## 2021-04-26 RX ORDER — EZETIMIBE 10 MG/1
TABLET ORAL
Qty: 90 TAB | Refills: 3 | Status: SHIPPED | OUTPATIENT
Start: 2021-04-26 | End: 2022-05-23

## 2021-04-26 NOTE — TELEPHONE ENCOUNTER
Per Dr. Leatha Sun VO:  [id number]  Future Appointments   Date Time Provider Jacqueline Amina   6/15/2021  9:00 AM ALLYSON JAMES   6/15/2021 10:20 AM MD LONDON Morton BS AMB     Requested Prescriptions     Pending Prescriptions Disp Refills    ezetimibe (ZETIA) 10 mg tablet [Pharmacy Med Name: EZETIMIBE  10MG  TAB] 90 Tab 3     Sig: TAKE 1 TABLET BY MOUTH  DAILY

## 2021-05-08 ENCOUNTER — HOSPITAL ENCOUNTER (OUTPATIENT)
Dept: LAB | Age: 71
Discharge: HOME | End: 2021-05-08
Payer: MEDICARE

## 2021-05-08 ENCOUNTER — TRANSCRIBE ORDER (OUTPATIENT)
Dept: EMERGENCY DEPT | Age: 71
End: 2021-05-08

## 2021-05-08 DIAGNOSIS — Z01.812 PRE-PROCEDURAL LABORATORY EXAMINATIONS: Primary | ICD-10-CM

## 2021-05-08 DIAGNOSIS — Z01.812 PRE-PROCEDURAL LABORATORY EXAMINATIONS: ICD-10-CM

## 2021-05-08 PROCEDURE — U0003 INFECTIOUS AGENT DETECTION BY NUCLEIC ACID (DNA OR RNA); SEVERE ACUTE RESPIRATORY SYNDROME CORONAVIRUS 2 (SARS-COV-2) (CORONAVIRUS DISEASE [COVID-19]), AMPLIFIED PROBE TECHNIQUE, MAKING USE OF HIGH THROUGHPUT TECHNOLOGIES AS DESCRIBED BY CMS-2020-01-R: HCPCS

## 2021-05-09 LAB — SARS-COV-2, COV2NT: NOT DETECTED

## 2021-05-11 NOTE — PROGRESS NOTES
[address]  Endoscopy Preprocedure Instructions      1. On the day of your surgery, please report to registration located on the 2nd floor of the  [facility]. yes    2. You must have a responsible adult to drive you to the hospital, stay at the hospital during your procedure and drive you home. If they leave your procedure will not be started (no exceptions). yes    3. Do not have anything to eat or drink (including water, gum, mints, coffee, and juice) after midnight. This does not apply to the medications you were instructed to take by your physician. yesIf you are currently taking Plavix, Coumadin, Aspirin, or other blood-thinning agents, contact your physician for special instructions. yes,    4. If you are having a procedure that requires bowel prep: We recommend that you have only clear liquids the day before your procedure and begin your bowel prep by 5:00 pm.  You may continue to drink clear liquids until midnight. If for any reason you are not able to complete your prep please notify your physician immediately. yes    5. Have a list of all current medications, including vitamins, herbal supplements and any other over the counter medications. yes    6. If you wear glasses, contacts, dentures and/or hearing aids, they may be removed prior to procedure, please bring a case to store them in. yes    7. You should understand that if you do not follow these instructions your procedure may be cancelled. If your physical condition changes (I.e. fever, cold or flu) please contact your doctor as soon as possible. 8. It is important that you be on time.   If for any reason you are unable to keep your appointment please call )- the day of or your physicians office prior to your scheduled procedure

## 2021-05-12 ENCOUNTER — HOSPITAL ENCOUNTER (OUTPATIENT)
Age: 71
Setting detail: OUTPATIENT SURGERY
Discharge: HOME | End: 2021-05-12
Attending: INTERNAL MEDICINE | Admitting: INTERNAL MEDICINE
Payer: MEDICARE

## 2021-05-12 ENCOUNTER — ANESTHESIA EVENT (OUTPATIENT)
Dept: ENDOSCOPY | Age: 71
End: 2021-05-12
Payer: MEDICARE

## 2021-05-12 ENCOUNTER — ANESTHESIA (OUTPATIENT)
Dept: ENDOSCOPY | Age: 71
End: 2021-05-12
Payer: MEDICARE

## 2021-05-12 VITALS
WEIGHT: 174.16 LBS | BODY MASS INDEX: 29.73 KG/M2 | RESPIRATION RATE: 15 BRPM | SYSTOLIC BLOOD PRESSURE: 139 MMHG | HEART RATE: 63 BPM | TEMPERATURE: 98 F | OXYGEN SATURATION: 100 % | DIASTOLIC BLOOD PRESSURE: 70 MMHG | HEIGHT: 64 IN

## 2021-05-12 LAB
H PYLORI FROM TISSUE: NEGATIVE
KIT LOT NO., HCLOLOT: NORMAL
NEGATIVE CONTROL: NEGATIVE
POSITIVE CONTROL: POSITIVE

## 2021-05-12 PROCEDURE — 77030013992 HC SNR POLYP ENDOSC BSC -B: Performed by: INTERNAL MEDICINE

## 2021-05-12 PROCEDURE — 76040000007: Performed by: INTERNAL MEDICINE

## 2021-05-12 PROCEDURE — 74011250636 HC RX REV CODE- 250/636: Performed by: INTERNAL MEDICINE

## 2021-05-12 PROCEDURE — 77030021593 HC FCPS BIOP ENDOSC BSC -A: Performed by: INTERNAL MEDICINE

## 2021-05-12 PROCEDURE — 2709999900 HC NON-CHARGEABLE SUPPLY: Performed by: INTERNAL MEDICINE

## 2021-05-12 PROCEDURE — 74011000250 HC RX REV CODE- 250: Performed by: NURSE ANESTHETIST, CERTIFIED REGISTERED

## 2021-05-12 PROCEDURE — 87077 CULTURE AEROBIC IDENTIFY: CPT | Performed by: INTERNAL MEDICINE

## 2021-05-12 PROCEDURE — 88305 TISSUE EXAM BY PATHOLOGIST: CPT

## 2021-05-12 PROCEDURE — 76060000032 HC ANESTHESIA 0.5 TO 1 HR: Performed by: INTERNAL MEDICINE

## 2021-05-12 PROCEDURE — 88342 IMHCHEM/IMCYTCHM 1ST ANTB: CPT

## 2021-05-12 PROCEDURE — 74011250636 HC RX REV CODE- 250/636: Performed by: NURSE ANESTHETIST, CERTIFIED REGISTERED

## 2021-05-12 RX ORDER — SODIUM CHLORIDE 9 MG/ML
50 INJECTION, SOLUTION INTRAVENOUS CONTINUOUS
Status: DISCONTINUED | OUTPATIENT
Start: 2021-05-12 | End: 2021-05-12 | Stop reason: HOSPADM

## 2021-05-12 RX ORDER — NALOXONE HYDROCHLORIDE 0.4 MG/ML
0.4 INJECTION, SOLUTION INTRAMUSCULAR; INTRAVENOUS; SUBCUTANEOUS
Status: DISCONTINUED | OUTPATIENT
Start: 2021-05-12 | End: 2021-05-12 | Stop reason: HOSPADM

## 2021-05-12 RX ORDER — DEXTROMETHORPHAN/PSEUDOEPHED 2.5-7.5/.8
1.2 DROPS ORAL
Status: DISCONTINUED | OUTPATIENT
Start: 2021-05-12 | End: 2021-05-12 | Stop reason: HOSPADM

## 2021-05-12 RX ORDER — ATROPINE SULFATE 0.1 MG/ML
0.5 INJECTION INTRAVENOUS
Status: DISCONTINUED | OUTPATIENT
Start: 2021-05-12 | End: 2021-05-12 | Stop reason: HOSPADM

## 2021-05-12 RX ORDER — MIDAZOLAM HYDROCHLORIDE 1 MG/ML
.25-5 INJECTION, SOLUTION INTRAMUSCULAR; INTRAVENOUS
Status: DISCONTINUED | OUTPATIENT
Start: 2021-05-12 | End: 2021-05-12 | Stop reason: HOSPADM

## 2021-05-12 RX ORDER — FENTANYL CITRATE 50 UG/ML
100 INJECTION, SOLUTION INTRAMUSCULAR; INTRAVENOUS
Status: DISCONTINUED | OUTPATIENT
Start: 2021-05-12 | End: 2021-05-12 | Stop reason: HOSPADM

## 2021-05-12 RX ORDER — LIDOCAINE HYDROCHLORIDE 20 MG/ML
INJECTION, SOLUTION EPIDURAL; INFILTRATION; INTRACAUDAL; PERINEURAL AS NEEDED
Status: DISCONTINUED | OUTPATIENT
Start: 2021-05-12 | End: 2021-05-12 | Stop reason: HOSPADM

## 2021-05-12 RX ORDER — PROPOFOL 10 MG/ML
INJECTION, EMULSION INTRAVENOUS
Status: DISCONTINUED | OUTPATIENT
Start: 2021-05-12 | End: 2021-05-12 | Stop reason: HOSPADM

## 2021-05-12 RX ORDER — FLUMAZENIL 0.1 MG/ML
0.2 INJECTION INTRAVENOUS
Status: DISCONTINUED | OUTPATIENT
Start: 2021-05-12 | End: 2021-05-12 | Stop reason: HOSPADM

## 2021-05-12 RX ORDER — SODIUM CHLORIDE 9 MG/ML
INJECTION, SOLUTION INTRAVENOUS
Status: DISCONTINUED | OUTPATIENT
Start: 2021-05-12 | End: 2021-05-12 | Stop reason: HOSPADM

## 2021-05-12 RX ORDER — PROPOFOL 10 MG/ML
INJECTION, EMULSION INTRAVENOUS AS NEEDED
Status: DISCONTINUED | OUTPATIENT
Start: 2021-05-12 | End: 2021-05-12 | Stop reason: HOSPADM

## 2021-05-12 RX ORDER — EPINEPHRINE 0.1 MG/ML
1 INJECTION INTRACARDIAC; INTRAVENOUS
Status: DISCONTINUED | OUTPATIENT
Start: 2021-05-12 | End: 2021-05-12 | Stop reason: HOSPADM

## 2021-05-12 RX ADMIN — PROPOFOL INJECTABLE EMULSION 20 MG: 10 INJECTION, EMULSION INTRAVENOUS at 13:42

## 2021-05-12 RX ADMIN — PROPOFOL INJECTABLE EMULSION 20 MG: 10 INJECTION, EMULSION INTRAVENOUS at 13:39

## 2021-05-12 RX ADMIN — FENTANYL CITRATE 25 MCG: 0.05 INJECTION, SOLUTION INTRAMUSCULAR; INTRAVENOUS at 13:47

## 2021-05-12 RX ADMIN — SODIUM CHLORIDE 50 ML/HR: 9 INJECTION, SOLUTION INTRAVENOUS at 12:27

## 2021-05-12 RX ADMIN — PROPOFOL INJECTABLE EMULSION 30 MG: 10 INJECTION, EMULSION INTRAVENOUS at 13:58

## 2021-05-12 RX ADMIN — PROPOFOL INJECTABLE EMULSION 70 MG: 10 INJECTION, EMULSION INTRAVENOUS at 13:37

## 2021-05-12 RX ADMIN — PROPOFOL INJECTABLE EMULSION 140 MCG/KG/MIN: 10 INJECTION, EMULSION INTRAVENOUS at 13:45

## 2021-05-12 RX ADMIN — PROPOFOL INJECTABLE EMULSION 50 MG: 10 INJECTION, EMULSION INTRAVENOUS at 13:47

## 2021-05-12 RX ADMIN — LIDOCAINE HYDROCHLORIDE 100 MG: 20 INJECTION, SOLUTION INTRAVENOUS at 13:37

## 2021-05-12 RX ADMIN — FENTANYL CITRATE 25 MCG: 0.05 INJECTION, SOLUTION INTRAMUSCULAR; INTRAVENOUS at 13:37

## 2021-05-12 RX ADMIN — SODIUM CHLORIDE: 900 INJECTION, SOLUTION INTRAVENOUS at 13:33

## 2021-05-12 NOTE — H&P
Patient Name: Afshan Cerda   2021   Gender: Female    (age): 1950 (71)         Referring Physician:     Viky Barrios  [address] [place] [address] [facility]  [phone] (phone)  [phone] (fax)        Chief Complaint: positive FOBT           History of Present Illness:   Formally on combination of Eliquis plus Brilinta for atrial fibrillation, coronary artery disease; was taken off of Brilinta recently. Does not have chest pain, difficulty breathing, extremity swelling. Also does not have indigestion, heartburn, diarrhea, constipation, abdominal pain. Recent routine physical exam occult intestinal blood identified. She does not have visible blood loss. Last colonoscopy exam more than 10 years ago. ? ?         Past Medical History      Medical Conditions: High blood pressure  High cholesterol  Ischemic Heart Disease  Thyroid disease   Surgical Procedures: cardiac cath x 2 stents   Dx Studies: CT Scan   Medications: amlodipine 2.5 mg Take 1 tablet by mouth once a day  ascorbic acid (vitamin C) 1,000 mg Take 1 tablet by mouth once a day as directed  coQ10 (ubiquinol) 100 mg Take 1 capsule by mouth twice a day as directed  Eliquis 5 mg Take 1 tablet by mouth twice a day  ezetimibe 10 mg Take 1 tablet by mouth once a day  levothyroxine 125 mcg Take 1 tablet by mouth once a day  liothyronine 5 mcg Take 1 tablet by mouth once a day  metoprolol tartrate 25 mg Take 1 tablet by mouth twice a day   mg Take 1 capsule by mouth three times a day as directed  nitric acid (bulk) 65 % to 70 % Drink 1 teaspoon by mouth twice a day as directed  omega 3-dha-epa-fish oil 500-1,000 mg Take 3 capsule by mouth once a day as directed   Allergies: Latex  Neosporin  Penicillins  Sulfa (Sulfonamide Antibiotics)   Immunizations: COVID Vaccine, 2021; 2021  Influenza vaccination (refused)  Influenza, seasonal, injectable (refused)      Social History      Alcohol: Alcohol consumption: Monthly. Tobacco: Never smoker   Drugs: None   Exercise: Exercise 3 or more times a week. Caffeine: Weekly. Family History Daughter: Diagnosed with breast cancer;       Review of Systems:   Cardiovascular: Denies chest pain, irregular heart beat, palpitations, peripheral edema, syncope, Sweats. Constitutional: Denies fatigue, fever, loss of appetite, weight gain, weight loss. ENMT: Denies nose bleeds, sore throat, hearing loss. Endocrine: Denies excessive thirst, heat intolerance. Eyes: Denies loss of vision. Gastrointestinal: Denies abdominal pain, abdominal swelling, change in bowel habits, constipation, diarrhea, Bloating/gas, heartburn, jaundice, nausea, rectal bleeding, stomach cramps, vomiting, dysphagia, rectal pain, Stool incontinence, hematemesis. Genitourinary: Denies dark urine, dysuria, frequent urination, hematuria, incontinence. Hematologic/Lymphatic: Presents suffers from easy bruising. Denies prolonged bleeding. Integumentary: Denies itching, rashes, sun sensitivity. Musculoskeletal: Denies arthritis, back pain, gout, joint pain, muscle weakness, stiffness. Neurological: Denies dizziness, fainting, frequent headaches, memory loss. Psychiatric: Presents suffers from anxiety. Denies depression, difficulty sleeping, hallucinations, nervousness, panic attacks, paranoia. Respiratory: Denies cough, dyspnea, wheezing. Vital Signs: See nursing notes     Physical Exam:   Constitutional:      Appearance: No distress, appears comfortable. Skin:      Inspection: No rash, no jaundice. Head/face: Inspection: Normacephalic, atraumatic. Eyes:      Conjunctivae/lids: Normal.   ENMT:      External: Normal.   Neck:      Neck: Normal appearance, trachea midline. Respiratory: Clear to percussion and auscultation      Cardiovascular: Auscultation: normal, S1 and S2, no gallops , no rubs or murmurs .      Gastrointestinal/Abdomen:      Abdomen: non-distended, nontender. Liver/Spleen: normal, normal size, Liver size and consistency normal, spleen is non-palpable. Musculoskeletal:      Gait/station: normal.   Muscles: normal strength and tone, no atrophy or abnormal movements. Psychiatric:      Judgment/insight: Normal, normal judgement, normal insight. Mood and affect: Normal mood, affect full, no evidence of depression, anxiety or agitation. Lymphatic:      Neck: No lymphadenopathy in the cervical or supraclavicular chain. Lab Results: CBC, CMP were unremarkable    Impressions: Nonspecific abnormal findings in stool contents  Chronic atrial fibrillation  Long term (current) use of anticoagulants? ?       Plan: I've discussed EGD, colonoscopy possible biopsy, polypectomy, cautery, injection, alternatives, complications including but not limited to pain, cardiopulmonary event, bleeding, perforation requiring additional blood transfusion or operative repair; all questions answered.

## 2021-05-12 NOTE — PROCEDURES
Jonny Santo M.D. May 12, 2021    Esophagogastroduodenoscopy (EGD) Colonoscopy Procedure Note  Juan Pelletier  : 1950  [facility] Medical Record Number: [id number]      Indications:    Occult blood in stool with possible UGI origin, colon cancer screening  Referring Physician:  Dian Henson MD  Anesthesia/Sedation: see nursing notes  Endoscopist:  Dr. Bud Knox  Assistants: None  Permit:  The indications, risks, benefits and alternatives were reviewed with the patient or their decision maker who was provided an opportunity to ask questions and all questions were answered. The specific risks of esophagogastroduodenoscopy with conscious sedation were reviewed, including but not limited to anesthetic complication, bleeding, adverse drug reaction, missed lesion, infection, IV site reactions, and intestinal perforation which would lead to the need for surgical repair. Alternatives to EGD including radiographic imaging, observation without testing, or laboratory testing were reviewed as well as the limitations of those alternatives discussed. After considering the options and having all their questions answered, the patient or their decision maker provided both verbal and written consent to proceed. Procedure in Detail:  After obtaining informed consent, positioning of the patient in the left lateral decubitus position, and conduction of a pre-procedure pause or \"time out\" the endoscope was introduced into the mouth and advanced to the duodenum. A careful inspection was made, and findings or interventions are described below.     Findings:   Esophagus:granular mucosa with EG j erosion  Stomach: diffuse moderate erythema without ulcer, mass  Duodenum/jejunum: normal    Complications/estimated blood loss: none    Therapies:  biopsy of esophagus  biopsy of stomach fundus    Specimens: biopsies    Implants:none           Endoscopist:  Dr. Weston Baltazar  Assistants: None  Complications:  None  Estimate Blood Loss:  None    Colonoscopy is to follow    Permit:  The indications, risks, benefits and alternatives were reviewed with the patient or their decision maker who was provided an opportunity to ask questions and all questions were answered. The specific risks of colonoscopy with conscious sedation were reviewed, including but not limited to anesthetic complication, bleeding, adverse drug reaction, missed lesion, infection, IV site reactions, and intestinal perforation which would lead to the need for surgical repair. Alternatives to colonoscopy including radiographic imaging, observation without testing, or laboratory testing were reviewed including the limitations of those alternatives. After considering the options and having all their questions answered, the patient or their decision maker provided both verbal and written consent to proceed. Procedure in Detail:  After obtaining informed consent, positioning of the patient in the left lateral decubitus position, and conduction of a pre-procedure pause or \"time out\" the endoscope was introduced into the anus and advanced to the terminal ileum. The quality of the colonic preparation was adequate. A careful inspection was made as the colonoscope was withdrawn, findings and interventions are described below. Appendiceal orifice photographed    Findings:       - Diverticulosis numerous sigmoid, descending with deformity  Two sessile 8-10 mm transverse polypc    Specimens:    polyps removed cold snare    Implants: none    Complications:   None; patient tolerated the procedure well. Estimated blood loss: none    Impression:  See post-procedure diagnoses    Recommendations:     - Repeat colonoscopy in 5 years. - daily pantoprazole    Thank you for entrusting me with this patient's care.   Please do not hesitate to contact me with any questions or if I can be of assistance with any of your other patients' GI needs.     Signed By: Danielle Wang MD                        May 12, 2021

## 2021-05-12 NOTE — ROUTINE PROCESS
Veronica Gaitan  1950  [id number]    Situation:  Verbal report received from: Radha Payne  Procedure: Procedure(s):  ESOPHAGOGASTRODUODENOSCOPY (EGD)  COLONOSCOPY  ESOPHAGOGASTRODUODENAL (EGD) BIOPSY  ENDOSCOPIC POLYPECTOMY    Background:    Preoperative diagnosis: NONSPECFIC ABNORMAL FINDIDNS IN STOOL CONTENTS  CHRONIC ATRIAL FIBRILLATION  Postoperative diagnosis: 1- Colon Polyp  2- Diverticulosis  3- Gastritis  4- Esophagitis. :  Dr. Gilbert Guerin  Assistant(s): Endoscopy Technician-1: Dilia Last  Endoscopy RN-1: Alisa Steel  Endoscopy RN-2: Bettie Jameson RN    Specimens:   ID Type Source Tests Collected by Time Destination   1 : Stomach Biopsy Preservative   Floridalma Sauceda MD 5/12/2021 1341 Pathology   2 : EG Junction Biopsy Preservative   Floridalma Sauceda MD 5/12/2021 1341 Pathology   3 : Colon Polyp. Preservative   Floridalma Sauceda MD 5/12/2021 1357 Pathology     H. Pylori  yes    Assessment:  Intra-procedure medications       Anesthesia gave intra-procedure sedation and medications, see anesthesia flow sheet yes    Intravenous fluids: NS@ KVO     Vital signs stable     Abdominal assessment: round and soft     Recommendation:  Discharge patient per MD order.   Family   Permission to share finding with family or friend yes

## 2021-05-12 NOTE — ANESTHESIA PREPROCEDURE EVALUATION
Relevant Problems   CARDIOVASCULAR   (+) Atrial fibrillation with rapid ventricular response (HCC)   (+) CAD (coronary artery disease)   (+) Hypertension   (+) NSTEMI (non-ST elevated myocardial infarction) ([facility])   (+) PAF (paroxysmal atrial fibrillation) ([facility])      ENDOCRINE   (+) Hypothyroid       Anesthetic History   No history of anesthetic complications            Review of Systems / Medical History  Patient summary reviewed, nursing notes reviewed and pertinent labs reviewed    Pulmonary  Within defined limits                 Neuro/Psych   Within defined limits           Cardiovascular    Hypertension        Dysrhythmias : atrial fibrillation  Past MI and CAD    Exercise tolerance: >4 METS  Comments: NSTEMI w/stent 2020   GI/Hepatic/Renal  Within defined limits              Endo/Other      Hypothyroidism       Other Findings              Physical Exam    Airway  Mallampati: II    Neck ROM: normal range of motion   Mouth opening: Normal     Cardiovascular  Regular rate and rhythm,  S1 and S2 normal,  no murmur, click, rub, or gallop  Rhythm: regular  Rate: normal         Dental  No notable dental hx       Pulmonary  Breath sounds clear to auscultation               Abdominal  GI exam deferred       Other Findings            Anesthetic Plan    ASA: 3  Anesthesia type: MAC          Induction: Intravenous  Anesthetic plan and risks discussed with: Patient

## 2021-05-12 NOTE — DISCHARGE INSTRUCTIONS
Anthony Nichelle  [id number]  1950    DISCHARGE INSTRUCTIONS  Discomfort:  Redness at IV site- apply warm compress to area; if redness or soreness persist- contact your physician. There may be a slight amount of blood passed from the rectum. Gaseous discomfort - walking, belching will help relieve any discomfort. You may not operate a vehicle for 12 hours. You may not engage in an occupation involving machinery or appliances for rest of today. You may not drink alcoholic beverages for at least 12 hours. Avoid making any critical decisions for at least 24 hours. DIET:   High fiber diet. Medications:              Use pantoprazole (or over the counter esomeprazole AKA Nexium 24) daily              Resume all  usual medications including Eliquis today   ACTIVITY:  You may resume your normal daily activities it is recommended that you spend the remainder of the day resting -  avoid any strenuous activity. CALL M.D.   ANY SIGN OF:   Increasing pain, nausea, vomiting  Abdominal distension (swelling)  New increased bleeding (oral or rectal)  Fever (chills)  Pain in chest area  Bloody discharge from nose or mouth  Shortness of breath     Follow-up Instructions:  Call Dr. Jossie Maxwell in five years for follow up exam      DISCHARGE SUMMARY from Nurse    The following personal items collected during your admission are returned to you:   Dental Appliance: Dental Appliances: None  Vision: Visual Aid: None  Hearing Aid:    Jewelry:    Clothing:    Other Valuables:    Valuables sent to safe:

## 2021-05-12 NOTE — ANESTHESIA POSTPROCEDURE EVALUATION
Procedure(s):  ESOPHAGOGASTRODUODENOSCOPY (EGD)  COLONOSCOPY  ESOPHAGOGASTRODUODENAL (EGD) BIOPSY  ENDOSCOPIC POLYPECTOMY. MAC    Anesthesia Post Evaluation      Multimodal analgesia: multimodal analgesia not used between 6 hours prior to anesthesia start to PACU discharge  Patient location during evaluation: PACU  Patient participation: complete - patient participated  Level of consciousness: awake  Pain management: adequate  Airway patency: patent  Anesthetic complications: no  Cardiovascular status: acceptable, blood pressure returned to baseline and hemodynamically stable  Respiratory status: acceptable  Hydration status: acceptable  Post anesthesia nausea and vomiting:  controlled      INITIAL Post-op Vital signs:   Vitals Value Taken Time   /97 05/12/21 1412   Temp     Pulse 70 05/12/21 1416   Resp 18 05/12/21 1416   SpO2 96 % 05/12/21 1416   Vitals shown include unvalidated device data.

## 2021-05-12 NOTE — PERIOP NOTES
Received report from Kahlil Concepcion CRNA. See anesthesia record. Patient taken to post-recovery. Post-recovery report given to Alfredo Young RN. Patient's ABD remains soft and non-tender post procedure. Pt has no complaints at this time and tolerated the procedure well. Endoscope was pre-cleaned at bedside immediately following procedure by Jay Osborn.

## 2021-06-15 ENCOUNTER — ANCILLARY PROCEDURE (OUTPATIENT)
Dept: CARDIOLOGY CLINIC | Age: 71
End: 2021-06-15
Payer: MEDICARE

## 2021-06-15 ENCOUNTER — OFFICE VISIT (OUTPATIENT)
Dept: CARDIOLOGY CLINIC | Age: 71
End: 2021-06-15

## 2021-06-15 VITALS
DIASTOLIC BLOOD PRESSURE: 70 MMHG | SYSTOLIC BLOOD PRESSURE: 124 MMHG | HEIGHT: 64 IN | WEIGHT: 172 LBS | BODY MASS INDEX: 29.37 KG/M2

## 2021-06-15 VITALS
WEIGHT: 172 LBS | OXYGEN SATURATION: 97 % | BODY MASS INDEX: 29.37 KG/M2 | HEART RATE: 67 BPM | HEIGHT: 64 IN | SYSTOLIC BLOOD PRESSURE: 124 MMHG | DIASTOLIC BLOOD PRESSURE: 70 MMHG

## 2021-06-15 DIAGNOSIS — E78.5 DYSLIPIDEMIA: ICD-10-CM

## 2021-06-15 DIAGNOSIS — I48.0 PAF (PAROXYSMAL ATRIAL FIBRILLATION) (HCC): ICD-10-CM

## 2021-06-15 DIAGNOSIS — I21.4 NSTEMI (NON-ST ELEVATED MYOCARDIAL INFARCTION) (HCC): ICD-10-CM

## 2021-06-15 DIAGNOSIS — I25.10 CORONARY ARTERY DISEASE INVOLVING NATIVE CORONARY ARTERY OF NATIVE HEART WITHOUT ANGINA PECTORIS: Primary | ICD-10-CM

## 2021-06-15 DIAGNOSIS — I10 ESSENTIAL HYPERTENSION: ICD-10-CM

## 2021-06-15 LAB
ECHO AO ROOT DIAM: 2.96 CM
ECHO AV MEAN GRADIENT: 12.75 MMHG
ECHO AV PEAK GRADIENT: 26.46 MMHG
ECHO AV PEAK VELOCITY: 257.2 CM/S
ECHO AV VTI: 47.21 CM
ECHO LA AREA 4C: 19.65 CM2
ECHO LA MAJOR AXIS: 3.92 CM
ECHO LA MINOR AXIS: 2.14 CM
ECHO LA VOL 2C: 68.87 ML (ref 22–52)
ECHO LA VOL 4C: 50.94 ML (ref 22–52)
ECHO LA VOL BP: 64.65 ML (ref 22–52)
ECHO LA VOL/BSA BIPLANE: 35.33 ML/M2 (ref 16–28)
ECHO LA VOLUME INDEX A2C: 37.63 ML/M2 (ref 16–28)
ECHO LA VOLUME INDEX A4C: 27.84 ML/M2 (ref 16–28)
ECHO LV E' LATERAL VELOCITY: 6.46 CM/S
ECHO LV E' SEPTAL VELOCITY: 5.12 CM/S
ECHO LV EDV A2C: 73.72 ML
ECHO LV EDV A4C: 84.13 ML
ECHO LV EDV BP: 80.88 ML (ref 56–104)
ECHO LV EDV INDEX A4C: 46 ML/M2
ECHO LV EDV INDEX BP: 44.2 ML/M2
ECHO LV EDV NDEX A2C: 40.3 ML/M2
ECHO LV EJECTION FRACTION A2C: 63 PERCENT
ECHO LV EJECTION FRACTION A4C: 62 PERCENT
ECHO LV EJECTION FRACTION BIPLANE: 63.3 PERCENT (ref 55–100)
ECHO LV ESV A2C: 27.57 ML
ECHO LV ESV A4C: 31.83 ML
ECHO LV ESV BP: 29.69 ML (ref 19–49)
ECHO LV ESV INDEX A2C: 15.1 ML/M2
ECHO LV ESV INDEX A4C: 17.4 ML/M2
ECHO LV ESV INDEX BP: 16.2 ML/M2
ECHO LV INTERNAL DIMENSION DIASTOLIC: 4.65 CM (ref 3.9–5.3)
ECHO LV INTERNAL DIMENSION SYSTOLIC: 3.06 CM
ECHO LV IVSD: 1.3 CM (ref 0.6–0.9)
ECHO LV MASS 2D: 219.8 G (ref 67–162)
ECHO LV MASS INDEX 2D: 120.1 G/M2 (ref 43–95)
ECHO LV POSTERIOR WALL DIASTOLIC: 1.19 CM (ref 0.6–0.9)
ECHO LVOT PEAK GRADIENT: 8.81 MMHG
ECHO LVOT PEAK VELOCITY: 148.37 CM/S
ECHO LVOT VTI: 31.92 CM
ECHO MV A VELOCITY: 153.58 CM/S
ECHO MV AREA PHT: 2.3 CM2
ECHO MV E DECELERATION TIME (DT): 329.48 MS
ECHO MV E VELOCITY: 123.58 CM/S
ECHO MV E/A RATIO: 0.8
ECHO MV E/E' LATERAL: 19.13
ECHO MV E/E' RATIO (AVERAGED): 21.63
ECHO MV E/E' SEPTAL: 24.14
ECHO MV PRESSURE HALF TIME (PHT): 95.55 MS
ECHO RA AREA 4C: 16.01 CM2
ECHO RV INTERNAL DIMENSION: 3.52 CM
ECHO RV TAPSE: 2.08 CM (ref 1.5–2)
ECHO TV REGURGITANT MAX VELOCITY: 263.06 CM/S
ECHO TV REGURGITANT PEAK GRADIENT: 27.68 MMHG
LA VOL DISK BP: 59.61 ML (ref 22–52)

## 2021-06-15 PROCEDURE — G8536 NO DOC ELDER MAL SCRN: HCPCS | Performed by: SPECIALIST

## 2021-06-15 PROCEDURE — 1090F PRES/ABSN URINE INCON ASSESS: CPT | Performed by: SPECIALIST

## 2021-06-15 PROCEDURE — 1101F PT FALLS ASSESS-DOCD LE1/YR: CPT | Performed by: SPECIALIST

## 2021-06-15 PROCEDURE — 3017F COLORECTAL CA SCREEN DOC REV: CPT | Performed by: SPECIALIST

## 2021-06-15 PROCEDURE — G8417 CALC BMI ABV UP PARAM F/U: HCPCS | Performed by: SPECIALIST

## 2021-06-15 PROCEDURE — G8752 SYS BP LESS 140: HCPCS | Performed by: SPECIALIST

## 2021-06-15 PROCEDURE — G8432 DEP SCR NOT DOC, RNG: HCPCS | Performed by: SPECIALIST

## 2021-06-15 PROCEDURE — G8754 DIAS BP LESS 90: HCPCS | Performed by: SPECIALIST

## 2021-06-15 PROCEDURE — 99214 OFFICE O/P EST MOD 30 MIN: CPT | Performed by: SPECIALIST

## 2021-06-15 PROCEDURE — G8400 PT W/DXA NO RESULTS DOC: HCPCS | Performed by: SPECIALIST

## 2021-06-15 PROCEDURE — G8427 DOCREV CUR MEDS BY ELIG CLIN: HCPCS | Performed by: SPECIALIST

## 2021-06-15 PROCEDURE — 93306 TTE W/DOPPLER COMPLETE: CPT | Performed by: SPECIALIST

## 2021-06-15 NOTE — PROGRESS NOTES
Elmer Messina MD. [facility]              Patient: Mary Calero  : 1950      Today's Date: 6/15/2021          HISTORY OF PRESENT ILLNESS:     History of Present Illness:  She feels OK overall. A little class 2 FAULKNER. Able to dance. No orthopnea. No CP. Slight dizziness at times. PAST MEDICAL HISTORY:     Past Medical History:   Diagnosis Date    CAD (coronary artery disease)     NSTEMI - PCI to LCX on 20     Dyslipidemia     Hypertension     Hypothyroid     NSTEMI (non-ST elevated myocardial infarction) ([facility] [address]     NSTEMI 20    PAF (paroxysmal atrial fibrillation) ([facility] [address]     Arrived with Afib with RVR 20 - converted to NSR spontaneously same day    Psychiatric disorder     anxiety    Statin intolerance        Past Surgical History:   Procedure Laterality Date    COLONOSCOPY N/A 2021    COLONOSCOPY performed by Vikas Vital MD at [facility] HX CORONARY STENT PLACEMENT      HX GYN      tubal ligation    NV CARDIAC SURG PROCEDURE UNLIST      PCI to Cx X 2 FAM         MEDICATIONS:     Current Outpatient Medications   Medication Sig Dispense Refill    OTHER Thyroid Syngergy      acetylcysteine (NAC PO) Take  by mouth.  OTHER,NON-FORMULARY, Nitric balance      ezetimibe (ZETIA) 10 mg tablet TAKE 1 TABLET BY MOUTH  DAILY 90 Tab 3    cyclobenzaprine (FLEXERIL) 10 mg tablet Take  by mouth three (3) times daily as needed for Muscle Spasm(s).  metoprolol tartrate (LOPRESSOR) 50 mg tablet Take 1 Tab by mouth two (2) times a day. 180 Tab 3    amLODIPine (NORVASC) 2.5 mg tablet Take 1 Tab by mouth daily. 90 Tab 3    rosuvastatin (CRESTOR) 5 mg tablet TAKE 1 TAB BY MOUTH EVERY  MONDAY, WEDNESDAY, FRIDAY.  (Patient taking differently: Take 5 mg by mouth every Monday, Wednesday, Friday, Saturday.) 39 Tab 3    Eliquis 5 mg tablet TAKE 1 TABLET BY MOUTH  TWICE DAILY 180 Tab 3    omega 3-DHA-EPA-fish oil 1,000 mg (120 mg-180 mg) capsule Take 3 Caps by mouth daily.  cholecalciferol (VITAMIN D3) (5000 Units/125 mcg) tab tablet Take 5,000 Units by mouth daily.  ascorbic acid, vitamin C, (Vitamin C) 1,000 mg tablet Take 1,000 mg by mouth daily.  liothyronine (CYTOMEL) 5 mcg tablet Take 2.5 mcg by mouth daily.  levothyroxine (SYNTHROID) 125 mcg tablet Take 125 mcg by mouth Daily (before breakfast).  nitroglycerin (NITROSTAT) 0.4 mg SL tablet 0.4 mg by SubLINGual route every five (5) minutes as needed for Chest Pain. Up to 3 doses. Allergies   Allergen Reactions    Adhesive Rash    Neosporin Plus [Pfqzo-Wglsi-Amnxmsl-Pramoxine] Rash    Pcn [Penicillins] Rash     Childhood rash           SOCIAL HISTORY:     Social History     Tobacco Use    Smoking status: Never Smoker    Smokeless tobacco: Never Used   Vaping Use    Vaping Use: Never used   Substance Use Topics    Alcohol use: Yes     Comment: rarely    Drug use: Never         FAMILY HISTORY:     Family History   Problem Relation Age of Onset    Heart Disease Mother     Heart Disease Father     Cancer Daughter            REVIEW OF SYMPTOMS:     Review of Symptoms:  Constitutional: Negative for fever, chills  HEENT: Negative for nosebleeds, tinnitus, and vision changes. Respiratory: Negative for cough, wheezing  Cardiovascular: Negative for orthopnea, claudication, syncope, and PND. Gastrointestinal: Negative for abdominal pain, diarrhea, melena. Genitourinary: Negative for dysuria  Musculoskeletal: Negative for myalgias. Skin: Negative for rash  Heme: No problems bleeding. Neurological: Negative for speech change and focal weakness. PHYSICAL EXAM:     Physical Exam:  Visit Vitals  /70 (BP 1 Location: Left upper arm, BP Patient Position: Sitting, BP Cuff Size: Adult)   Pulse 67   Ht 5' 4\" (1.626 m)   Wt 172 lb (78 kg)   SpO2 97%   BMI 29.52 kg/m²     Patient appears generally well, mood and affect are appropriate and pleasant.   HEENT:  Hearing intact, non-icteric, normocephalic, atraumatic. Neck Exam: Supple, No JVD or carotid bruits. Lung Exam: Clear to auscultation, even breath sounds. Cardiac Exam: Regular rate and rhythm with 2/6 systolic murmur  Abdomen: Soft, non-tender, normal bowel sounds. No bruits or masses. Extremities: Moves all ext well. No lower extremity edema. MSKTL: Overall good ROM ext  Skin: No significant rashes  Psych: Appropriate affect  Neuro - Grossly intact      LABS / OTHER STUDIES reviewed:     Lab Results   Component Value Date/Time    Sodium 140 03/28/2021 10:23 PM    Potassium 3.3 (L) 03/28/2021 10:23 PM    Chloride 107 03/28/2021 10:23 PM    CO2 24 03/28/2021 10:23 PM    Anion gap 9 03/28/2021 10:23 PM    Glucose 161 (H) 03/28/2021 10:23 PM    BUN 16 03/28/2021 10:23 PM    Creatinine 1.01 03/28/2021 10:23 PM    BUN/Creatinine ratio 16 03/28/2021 10:23 PM    GFR est AA >60 03/28/2021 10:23 PM    GFR est non-AA 54 (L) 03/28/2021 10:23 PM    Calcium 9.0 03/28/2021 10:23 PM    Bilirubin, total 0.4 03/28/2021 10:23 PM    Alk.  phosphatase 80 03/28/2021 10:23 PM    Protein, total 7.2 03/28/2021 10:23 PM    Albumin 3.6 03/28/2021 10:23 PM    Globulin 3.6 03/28/2021 10:23 PM    A-G Ratio 1.0 (L) 03/28/2021 10:23 PM    ALT (SGPT) 42 03/28/2021 10:23 PM    AST (SGOT) 28 03/28/2021 10:23 PM     Lab Results   Component Value Date/Time    WBC 4.4 03/28/2021 10:23 PM    HGB 12.6 03/28/2021 10:23 PM    HCT 37.3 03/28/2021 10:23 PM    PLATELET 476 99/06/4358 10:23 PM    MCV 88.0 03/28/2021 10:23 PM       Lab Results   Component Value Date/Time    Cholesterol, total 314 (H) 06/20/2020 03:34 AM    Cholesterol, Total 184 01/21/2021 10:11 AM    HDL Cholesterol 41 06/20/2020 03:34 AM    LDL, calculated 202.6 (H) 06/20/2020 03:34 AM    VLDL, calculated 70.4 06/20/2020 03:34 AM    Triglyceride 352 (H) 06/20/2020 03:34 AM    CHOL/HDL Ratio 7.7 (H) 06/20/2020 03:34 AM             CARDIAC DIAGNOSTICS:     Cardiac Evaluation Includes:  I reviewed the results below. EKG 6/19/20 - Afib with RVR, , St depression V2-V4   EKG 6/19/20 later in day - sinus cynthia, otherwise normal   EKG 3/28/21 - Afib with RVR, , NSST changes   EKG 3/28/21 # 2 - NSR, possible LAE         St[facility] test in 2020 at [facility] (Dr. Carlos Arellano) - was OK per patient      Echo 6/19/20 - LVEF 50-55%, inferolateral and anterolateral HK, mild LAE, MAC and mild MR, AV calcification      Cardiac Cath 6/19/20 - LM 30%, LAD 30%, pLCX 100%,  RCA: subselective inj; MLI - not well visualized.  LVEDP: 9   --->  FAM 2.5 by 33 to LCX     Event Monitor 8/1/20 - 8/30 - normal      Colonoscopy 5/21 - diverticulosis     Echo 6/15/21 -  LVEF 60-65%, MAC, mild AS        ASSESSMENT AND PLAN:     Assessment and Plan:    1) CAD  - presented with CP 6/19/20 and was in afib with RVR and ruled in for NSTEMI   - Cardiac cath 6/19/20 ---> FAM to 100% pLCX stenosis    - See below regarding statin   - Continue BB  - She is doing well without further chest pain (no exertional CP)   - Continue OAC     2) PAF  - Arrived with Afib with RVR 6/19/20 in setting of NSTEMI - converted to NSR spontaneously same day  - Had another ED visit on 3/28/21 with Afib with RVR - converted to sinus in ED  - Continue Eliquis 5 mg BID long term   - continue metoprolol      3) Dyslipidemia   - LDL was 202 on 6/20/20   - she has been intolerant of multiple statins (muscle pain)   - On 6/20/20 started Crestor 5 mg three days a week.    - Added Zetia 7/2020  - On 10/20 - lipids still high ---> Discussed Repatha ---> she wants to work on diet before considering taking this   - On 1/28/21 - Lipids better but LDL-P still 1369 - she has some shoulder / neck pain -- she can hold a statin for a month and see if pain gets better.  I again discussed Dami Yanez but she does not want to do that.    - holding statin did not help her neck pain (she had a pinched nerve)----> she will continue crestor 5 mg three days a week (she agreed reluctantly)      4) HTN  - she was unable to tolerate losartan (made her \"sick\")   - BP OK ---> continue meds     5) AV calcification / Mild AS   - follow on serial studies    6) Neck / shoulder pain / HA  - seeing a chiropractor and doing better      7) See me in 12 months.  had a cardiac arrest 6/30/20 and then went to [place] (she did CPR).          Belkys Avina MD, [place] [address], [address]  [address] [place] [address].  [address], [address]  Ph: [phone]   Ph [phone]

## 2021-06-15 NOTE — PROGRESS NOTES
Zaria Nicolas is a 66 y.o. female    Visit Vitals  /70 (BP 1 Location: Left upper arm, BP Patient Position: Sitting, BP Cuff Size: Adult)   Pulse 67   Ht 5' 4\" (1.626 m)   Wt 172 lb (78 kg)   SpO2 97%   BMI 29.52 kg/m²       Chief Complaint   Patient presents with    Follow-up     Echo w Erin Olivas    Irregular Heart Beat    Other     NSTEMI    Cholesterol Problem       Chest pain NO  SOB NO  Dizziness SOMETIMES  Swelling NO  Recent hospital visit NO  Refills NO

## 2021-08-12 RX ORDER — APIXABAN 5 MG/1
TABLET, FILM COATED ORAL
Qty: 180 TABLET | Refills: 3 | Status: SHIPPED | OUTPATIENT
Start: 2021-08-12 | End: 2022-09-23

## 2021-08-12 NOTE — TELEPHONE ENCOUNTER
Refill per VO of Dr. Yair Olivas  Last appt: 6/15/2021  Future Appointments   Date Time Provider Jacqueline Huynh   6/16/2022 10:20 AM Therese Mills MD [facility] AMB       Requested Prescriptions     Pending Prescriptions Disp Refills    Eliquis 5 mg tablet [Pharmacy Med Name: Eliquis 5 MG Oral Tablet] 180 Tablet 3     Sig: TAKE 1 TABLET BY MOUTH  TWICE DAILY

## 2022-01-11 LAB — HBA1C MFR BLD HPLC: 5 %

## 2022-01-17 NOTE — ED PROVIDER NOTES
Follow Up Call    Challenges to be reviewed by the provider   Additional needs identified to be addressed with provider: no  none           Encounter was not routed to provider for escalation. Method of communication with provider: none. Contacted the patient by telephone to follow up after ED. Status: significantly improved  Interventions to address identified needs: Obtained and reviewed discharge summary and/or continuity of care documents    [facility] follow up appointment(s):   Future Appointments   Date Time Provider Tim Denisse   1/17/2022  1:45 PM Prasanna Nath MD [facility]     [facility] follow up appointment(s):    Follow up appointment completed? no.     Provided contact information for future needs. No further follow-up call indicated based on severity of symptoms and risk factors.   Plan for next call: none     Cristine Lawrence RN The patient is a 56-year-old female who presents to the ED with a complaint of midsternal chest pain that began approximately 1 to 2-hour prior to arrival to the ED described as pressure that radiated to her left arm and shoulder, severity 5 out of 10, without any aggravating or relieving factors. The patient states that she woke up with the discomfort then took Tums, followed by 2 tablet of sublingual nitroglycerin with mild relief of discomfort. She also admits to her heart beating fast and irregularly. Upon further questioning, the patient state that she had a normal stress test and echocardiogram approximately 2 months ago performed by her cardiologist Dr. Leann Poole. Patient denies any fever, sore throat, cough, congestion, headache, neck pain, back pain, shortness of breath, nausea, vomiting, abdominal pain, diarrhea, constipation, dysuria, hematuria, dizziness, extremity weakness or numbness, leg pain or swelling, prior history of the same. She admits to a strong family history of heart disease with both parents succumbing from complications from heart disease. No past medical history on file. No past surgical history on file. No family history on file.     Social History     Socioeconomic History    Marital status:      Spouse name: Not on file    Number of children: Not on file    Years of education: Not on file    Highest education level: Not on file   Occupational History    Not on file   Social Needs    Financial resource strain: Not on file    Food insecurity     Worry: Not on file     Inability: Not on file    Transportation needs     Medical: Not on file     Non-medical: Not on file   Tobacco Use    Smoking status: Not on file   Substance and Sexual Activity    Alcohol use: Not on file    Drug use: Not on file    Sexual activity: Not on file   Lifestyle    Physical activity     Days per week: Not on file     Minutes per session: Not on file    Stress: Not on file Relationships    Social connections     Talks on phone: Not on file     Gets together: Not on file     Attends Jain service: Not on file     Active member of club or organization: Not on file     Attends meetings of clubs or organizations: Not on file     Relationship status: Not on file    Intimate partner violence     Fear of current or ex partner: Not on file     Emotionally abused: Not on file     Physically abused: Not on file     Forced sexual activity: Not on file   Other Topics Concern    Not on file   Social History Narrative    Not on file         ALLERGIES: Patient has no known allergies. Review of Systems   All other systems reviewed and are negative. Vitals:    06/19/20 0359 06/19/20 0400 06/19/20 0415 06/19/20 0430   BP: 162/87 162/87 116/51 135/88   Pulse: (!) 122 (!) 134 (!) 134 99   Resp: 19 18 15 27   Temp: 97.6 °F (36.4 °C)      SpO2: 96% 97% 97% 98%   Weight: 78.9 kg (174 lb)      Height: 5' 4\" (1.626 m)               Physical Exam  Vitals signs and nursing note reviewed. CONSTITUTIONAL: Well-appearing; well-nourished; in no apparent distress  HEAD: Normocephalic; atraumatic  EYES: PERRL; EOM intact; conjunctiva and sclera are clear bilaterally. ENT: No rhinorrhea; normal pharynx with no tonsillar hypertrophy; mucous membranes pink/moist, no erythema, no exudate. NECK: Supple; non-tender; no cervical lymphadenopathy  CARD: Normal S1, S2; no murmurs, rubs, or gallops. Increase irregularly irregular rhythm. RESP: Normal respiratory effort; breath sounds clear and equal bilaterally; no wheezes, rhonchi, or rales. ABD: Normal bowel sounds; non-distended; non-tender; no palpable organomegaly, no masses, no bruits. Back Exam: Normal inspection; no vertebral point tenderness, no CVA tenderness. Normal range of motion. EXT: Normal ROM in all four extremities; non-tender to palpation; no swelling or deformity; distal pulses are normal, no edema.   SKIN: Warm; dry; no rash.  Neville Aniyah and oriented x 3, coherent, ASHLEY-XII grossly intact, sensory and motor are non-focal.        MDM  Number of Diagnoses or Management Options  Diagnosis management comments: Assessment: Chest pain and palpitations suspect atrial fibrillation rule out ACS/electrolyte abnormality/ VTE    Plan: Lab/EKG/chest x-ray/aspirin/Cardizem bolus and drip/Nitropaste/Lovenox/consult cardiology and hospitalist/ Monitor and Reevaluate. Amount and/or Complexity of Data Reviewed  Clinical lab tests: ordered and reviewed  Tests in the radiology section of CPT®: ordered and reviewed  Tests in the medicine section of CPT®: reviewed and ordered  Discussion of test results with the performing providers: yes  Decide to obtain previous medical records or to obtain history from someone other than the patient: yes  Obtain history from someone other than the patient: yes  Review and summarize past medical records: yes  Discuss the patient with other providers: yes  Independent visualization of images, tracings, or specimens: yes    Risk of Complications, Morbidity, and/or Mortality  Presenting problems: moderate  Diagnostic procedures: moderate  Management options: moderate    Critical Care  Total time providing critical care: (Total critical care time spent exclusive of procedures: 45 minutes)    Patient Progress  Patient progress: stable         Procedures    ED EKG interpretation:  Rhythm: atrial fib; and rapid ventricular rate . Rate (approx.): 130; Axis: left axis deviation; QRS interval: normal ; ST/T wave: depressed; in  Lead: V2, V3 and V4; Other findings: abnormal ekg. This EKG was interpreted by Lion Correia MD,ED Provider. XRAY INTERPRETATION (ED MD)  Chest Xray  No acute process seen. Normal heart size. No bony abnormalities. No infiltrate. Maggie Cash MD 4:42 AM      Repeat ED EKG interpretation:  Rhythm: atrial fib; and irregular with rapid ventricular .  Rate (approx.): 95; Axis: left axis deviation; QRS interval: normal ; ST/T wave: depressed; in  Lead: V2, V3  and V4; Other findings: abnormal ekg. This EKG was interpreted by Emily Donaldson MD,ED Provider. CONSULT NOTE:  Colby Roe MD spoke with Dr. Yamile García of Cardiology Discussed patient's presentation, history, physical assessment, and available diagnostic results. Recommends admission to the hospital and will see the patient in consult in the morning,    PROGRESS NOTE:  Pt has been reexamined by Colby Roe MD all available results have been reviewed with pt and any available family. Pt understands sx, dx, and tx in ED. Care plan has been outlined and questions have been answered. Pt and any available family understands and agrees to need for admission to hospital for further tx not available in ED. Pt is ready for admission. Written by Emily Donaldson MD,  4:43 AM      Hospitalist Perfect Serve for Admission  5:06 AM    ED Room Number: ER06/06  Patient Name and age:  Itzel Ferris 65 y.o.  female  Working Diagnosis:   1. Paroxysmal atrial fibrillation (HCC)    2. NSTEMI (non-ST elevated myocardial infarction) ([facility] [address]        COVID-19 Suspicion:  no    Code Status:  Full Code  Readmission: no  Isolation Requirements:  no  Recommended Level of Care:  step down  Department:[facility] ED - [phone]  Other: The patient is on heparin drip and Cardizem drip at this time. She is pain-free. CONSULT NOTE:  Colby Roe MD spoke with Dr. Peter Dubose of the adult hospitalist team. Discussed patient's presentation, history, physical assessment, and available diagnostic results.  He will evaluate, write orders and admit the patient to the hospital. 5:06 AM  .

## 2022-03-03 RX ORDER — AMLODIPINE BESYLATE 2.5 MG/1
TABLET ORAL
Qty: 90 TABLET | Refills: 1 | Status: SHIPPED | OUTPATIENT
Start: 2022-03-03 | End: 2022-09-02 | Stop reason: SDUPTHER

## 2022-03-03 NOTE — TELEPHONE ENCOUNTER
Refill per VO of Dr. Oz Dimas  Last appt: 6/15/2021  Future Appointments   Date Time Provider Jacqueline Huynh   6/16/2022 10:20 AM Patricia Allen MD [facility] BS AMB       Requested Prescriptions     Pending Prescriptions Disp Refills    amLODIPine (NORVASC) 2.5 mg tablet [Pharmacy Med Name: amLODIPine Besylate 2.5 MG Oral Tablet] 90 Tablet 3     Sig: TAKE 1 TABLET BY MOUTH  DAILY

## 2022-03-18 PROBLEM — E03.9 HYPOTHYROID: Status: ACTIVE | Noted: 2020-06-19

## 2022-03-19 PROBLEM — I21.4 NSTEMI (NON-ST ELEVATED MYOCARDIAL INFARCTION) (HCC): Status: ACTIVE | Noted: 2020-06-19

## 2022-03-19 PROBLEM — I48.91 ATRIAL FIBRILLATION WITH RAPID VENTRICULAR RESPONSE (HCC): Status: ACTIVE | Noted: 2020-06-19

## 2022-05-03 RX ORDER — METOPROLOL TARTRATE 50 MG/1
TABLET ORAL
Qty: 180 TABLET | Refills: 1 | Status: SHIPPED | OUTPATIENT
Start: 2022-05-03 | End: 2022-06-16

## 2022-05-03 NOTE — TELEPHONE ENCOUNTER
Refill per VO of Dr. Cassia Sosa  Last appt: 6/15/2021  Future Appointments   Date Time Provider Jacqueline Huynh   2022 10:20 AM Brittany Johnson MD [facility] BS AMB       Requested Prescriptions     Signed Prescriptions Disp Refills    metoprolol tartrate (LOPRESSOR) 50 mg tablet 180 Tablet 1     Sig: TAKE 1 TABLET BY MOUTH  TWICE DAILY     Authorizing Provider: Haroon Krishnan     Ordering User: Deneise Apgar

## 2022-05-23 RX ORDER — EZETIMIBE 10 MG/1
TABLET ORAL
Qty: 90 TABLET | Refills: 1 | Status: SHIPPED | OUTPATIENT
Start: 2022-05-23

## 2022-05-23 NOTE — TELEPHONE ENCOUNTER
Refill per VO of Dr. Justus Acosta  Last appt: 6/15/21  Future Appointments   Date Time Provider Jacqueline Huynh   6/16/2022 10:20 AM Danielle Novak MD [facility] AMB       Requested Prescriptions     Signed Prescriptions Disp Refills    ezetimibe (ZETIA) 10 mg tablet 90 Tablet 1     Sig: TAKE 1 TABLET BY MOUTH  DAILY     Authorizing Provider: Jeffrey Palacios     Ordering User: Neal Verde

## 2022-06-09 ENCOUNTER — TELEPHONE (OUTPATIENT)
Dept: CARDIOLOGY CLINIC | Age: 72
End: 2022-06-09

## 2022-06-09 NOTE — TELEPHONE ENCOUNTER
Patient went to get labs done before  appt but realized they  in March. Wondering if we could send her new orders (if needed). Can fax to [phone]     Wondering if we could add cholesterol lab work as well.      [phone]

## 2022-06-10 DIAGNOSIS — I21.4 NSTEMI (NON-ST ELEVATED MYOCARDIAL INFARCTION) (HCC): Primary | ICD-10-CM

## 2022-06-10 DIAGNOSIS — I25.10 CORONARY ARTERY DISEASE INVOLVING NATIVE CORONARY ARTERY OF NATIVE HEART WITHOUT ANGINA PECTORIS: ICD-10-CM

## 2022-06-10 DIAGNOSIS — E78.5 DYSLIPIDEMIA: ICD-10-CM

## 2022-06-11 LAB
ALBUMIN SERPL-MCNC: 4.8 G/DL (ref 3.7–4.7)
ALBUMIN/GLOB SERPL: 1.8 {RATIO} (ref 1.2–2.2)
ALP SERPL-CCNC: 63 IU/L (ref 44–121)
ALT SERPL-CCNC: 40 IU/L (ref 0–32)
AST SERPL-CCNC: 50 IU/L (ref 0–40)
BILIRUB SERPL-MCNC: 0.8 MG/DL (ref 0–1.2)
BUN SERPL-MCNC: 13 MG/DL (ref 8–27)
BUN/CREAT SERPL: 13 (ref 12–28)
CALCIUM SERPL-MCNC: 10.4 MG/DL (ref 8.7–10.3)
CHLORIDE SERPL-SCNC: 103 MMOL/L (ref 96–106)
CHOLEST SERPL-MCNC: 315 MG/DL (ref 100–199)
CO2 SERPL-SCNC: 23 MMOL/L (ref 20–29)
CREAT SERPL-MCNC: 0.97 MG/DL (ref 0.57–1)
EGFR: 61 ML/MIN/1.73
ERYTHROCYTE [DISTWIDTH] IN BLOOD BY AUTOMATED COUNT: 12.6 % (ref 11.7–15.4)
GLOBULIN SER CALC-MCNC: 2.6 G/DL (ref 1.5–4.5)
GLUCOSE SERPL-MCNC: 106 MG/DL (ref 65–99)
HCT VFR BLD AUTO: 43.7 % (ref 34–46.6)
HDL SERPL-SCNC: 33.2 UMOL/L
HDLC SERPL-MCNC: 47 MG/DL
HGB BLD-MCNC: 14.8 G/DL (ref 11.1–15.9)
IMP & REVIEW OF LAB RESULTS: NORMAL
LDL SERPL QN: 20.8 NM
LDL SERPL-SCNC: 3223 NMOL/L
LDL SMALL SERPL-SCNC: 1749 NMOL/L
LDLC SERPL CALC-MCNC: 221 MG/DL (ref 0–99)
LP-IR SCORE SERPL: 76
MCH RBC QN AUTO: 30 PG (ref 26.6–33)
MCHC RBC AUTO-ENTMCNC: 33.9 G/DL (ref 31.5–35.7)
MCV RBC AUTO: 89 FL (ref 79–97)
PLATELET # BLD AUTO: 226 X10E3/UL (ref 150–450)
POTASSIUM SERPL-SCNC: 4.9 MMOL/L (ref 3.5–5.2)
PROT SERPL-MCNC: 7.4 G/DL (ref 6–8.5)
RBC # BLD AUTO: 4.94 X10E6/UL (ref 3.77–5.28)
SODIUM SERPL-SCNC: 144 MMOL/L (ref 134–144)
TRIGL SERPL-MCNC: 233 MG/DL (ref 0–149)
TSH SERPL DL<=0.005 MIU/L-ACNC: 0.05 UIU/ML (ref 0.45–4.5)
WBC # BLD AUTO: 3.6 X10E3/UL (ref 3.4–10.8)

## 2022-06-16 ENCOUNTER — OFFICE VISIT (OUTPATIENT)
Dept: CARDIOLOGY CLINIC | Age: 72
End: 2022-06-16
Payer: MEDICARE

## 2022-06-16 ENCOUNTER — DOCUMENTATION ONLY (OUTPATIENT)
Dept: CARDIOLOGY CLINIC | Age: 72
End: 2022-06-16

## 2022-06-16 VITALS
SYSTOLIC BLOOD PRESSURE: 138 MMHG | HEART RATE: 67 BPM | DIASTOLIC BLOOD PRESSURE: 80 MMHG | BODY MASS INDEX: 29.19 KG/M2 | OXYGEN SATURATION: 97 % | HEIGHT: 64 IN | WEIGHT: 171 LBS

## 2022-06-16 DIAGNOSIS — I10 ESSENTIAL HYPERTENSION: ICD-10-CM

## 2022-06-16 DIAGNOSIS — E78.5 DYSLIPIDEMIA: ICD-10-CM

## 2022-06-16 DIAGNOSIS — I25.10 CORONARY ARTERY DISEASE INVOLVING NATIVE CORONARY ARTERY OF NATIVE HEART WITHOUT ANGINA PECTORIS: Primary | ICD-10-CM

## 2022-06-16 DIAGNOSIS — I48.91 ATRIAL FIBRILLATION WITH RAPID VENTRICULAR RESPONSE (HCC): ICD-10-CM

## 2022-06-16 PROCEDURE — G8417 CALC BMI ABV UP PARAM F/U: HCPCS | Performed by: SPECIALIST

## 2022-06-16 PROCEDURE — G8432 DEP SCR NOT DOC, RNG: HCPCS | Performed by: SPECIALIST

## 2022-06-16 PROCEDURE — 93000 ELECTROCARDIOGRAM COMPLETE: CPT | Performed by: SPECIALIST

## 2022-06-16 PROCEDURE — 1123F ACP DISCUSS/DSCN MKR DOCD: CPT | Performed by: SPECIALIST

## 2022-06-16 PROCEDURE — 99214 OFFICE O/P EST MOD 30 MIN: CPT | Performed by: SPECIALIST

## 2022-06-16 PROCEDURE — G8536 NO DOC ELDER MAL SCRN: HCPCS | Performed by: SPECIALIST

## 2022-06-16 PROCEDURE — 3017F COLORECTAL CA SCREEN DOC REV: CPT | Performed by: SPECIALIST

## 2022-06-16 PROCEDURE — 1101F PT FALLS ASSESS-DOCD LE1/YR: CPT | Performed by: SPECIALIST

## 2022-06-16 PROCEDURE — 1090F PRES/ABSN URINE INCON ASSESS: CPT | Performed by: SPECIALIST

## 2022-06-16 PROCEDURE — G8427 DOCREV CUR MEDS BY ELIG CLIN: HCPCS | Performed by: SPECIALIST

## 2022-06-16 PROCEDURE — G8754 DIAS BP LESS 90: HCPCS | Performed by: SPECIALIST

## 2022-06-16 PROCEDURE — G8752 SYS BP LESS 140: HCPCS | Performed by: SPECIALIST

## 2022-06-16 PROCEDURE — G8400 PT W/DXA NO RESULTS DOC: HCPCS | Performed by: SPECIALIST

## 2022-06-16 RX ORDER — NEBIVOLOL 10 MG/1
10 TABLET ORAL DAILY
Qty: 30 TABLET | Refills: 12 | Status: SHIPPED | OUTPATIENT
Start: 2022-06-16

## 2022-06-16 RX ORDER — NEBIVOLOL 10 MG/1
10 TABLET ORAL DAILY
Qty: 30 TABLET | Refills: 12 | Status: SHIPPED | OUTPATIENT
Start: 2022-06-16 | End: 2022-06-16 | Stop reason: SDUPTHER

## 2022-06-16 NOTE — PROGRESS NOTES
Ifaenyi Richardson is a 74 y.o. female    Visit Vitals  /80 (BP 1 Location: Left upper arm, BP Patient Position: Sitting, BP Cuff Size: Adult)   Pulse 67   Ht 5' 4\" (1.626 m)   Wt 171 lb (77.6 kg)   SpO2 97%   BMI 29.35 kg/m²       Chief Complaint   Patient presents with    Hypertension    Irregular Heart Beat     PAF    Other     NSTEMI    Other     AFIB       Chest pain NO  SOB NO  Dizziness YES  Swelling NO  Recent hospital visit NO  Refills NO  COVID VACCINE STATUS YES  HAD COVID?  NO

## 2022-06-16 NOTE — PROGRESS NOTES
Nickie Handley MD. [facility]              Patient: Dalila Chan  : 1950      Today's Date: 2022          HISTORY OF PRESENT ILLNESS:     History of Present Illness:  She feels OK overall. A little class 2 FAULKNER. Able to dance. No orthopnea. No CP. Slight dizziness at times. PAST MEDICAL HISTORY:     Past Medical History:   Diagnosis Date    CAD (coronary artery disease)     NSTEMI - PCI to LCX on 20     Dyslipidemia     Hypertension     Hypothyroid     NSTEMI (non-ST elevated myocardial infarction) ([facility] [address]     NSTEMI 20    PAF (paroxysmal atrial fibrillation) ([facility] [address]     Arrived with Afib with RVR 20 - converted to NSR spontaneously same day    Psychiatric disorder     anxiety    Statin intolerance        Past Surgical History:   Procedure Laterality Date    COLONOSCOPY N/A 2021    COLONOSCOPY performed by Virginie Peres MD at [facility] HX CORONARY STENT PLACEMENT      HX GYN      tubal ligation    CO CARDIAC SURG PROCEDURE UNLIST      PCI to Cx X 2 FAM         MEDICATIONS:     Current Outpatient Medications   Medication Sig Dispense Refill    ezetimibe (ZETIA) 10 mg tablet TAKE 1 TABLET BY MOUTH  DAILY 90 Tablet 1    metoprolol tartrate (LOPRESSOR) 50 mg tablet TAKE 1 TABLET BY MOUTH  TWICE DAILY 180 Tablet 1    amLODIPine (NORVASC) 2.5 mg tablet TAKE 1 TABLET BY MOUTH  DAILY 90 Tablet 1    Eliquis 5 mg tablet TAKE 1 TABLET BY MOUTH  TWICE DAILY 180 Tablet 3    cyclobenzaprine (FLEXERIL) 10 mg tablet Take  by mouth three (3) times daily as needed for Muscle Spasm(s).  omega 3-DHA-EPA-fish oil 1,000 mg (120 mg-180 mg) capsule Take 3 Caps by mouth daily.  cholecalciferol (VITAMIN D3) (5000 Units/125 mcg) tab tablet Take 5,000 Units by mouth daily.  ascorbic acid, vitamin C, (Vitamin C) 1,000 mg tablet Take 1,000 mg by mouth daily.  liothyronine (CYTOMEL) 5 mcg tablet Take 2.5 mcg by mouth daily.       levothyroxine (SYNTHROID) 125 mcg tablet Take 125 mcg by mouth Daily (before breakfast).  nitroglycerin (NITROSTAT) 0.4 mg SL tablet 0.4 mg by SubLINGual route every five (5) minutes as needed for Chest Pain. Up to 3 doses.  acetylcysteine (NAC PO) Take  by mouth. (Patient not taking: Reported on 6/16/2022)         Allergies   Allergen Reactions    Adhesive Rash    Neosporin Plus [Ihlat-Ohbfo-Herymdc-Pramoxine] Rash    Pcn [Penicillins] Rash     Childhood rash           SOCIAL HISTORY:     Social History     Tobacco Use    Smoking status: Never Smoker    Smokeless tobacco: Never Used   Vaping Use    Vaping Use: Never used   Substance Use Topics    Alcohol use: Yes     Comment: rarely    Drug use: Never         FAMILY HISTORY:     Family History   Problem Relation Age of Onset    Heart Disease Mother     Heart Disease Father     Cancer Daughter            REVIEW OF SYMPTOMS:     Review of Symptoms:  Constitutional: Negative for fever, chills  HEENT: Negative for nosebleeds, tinnitus, and vision changes. Respiratory: Negative for cough, wheezing  Cardiovascular: Negative for orthopnea, claudication, syncope, and PND. Gastrointestinal: Negative for abdominal pain, diarrhea, melena. Genitourinary: Negative for dysuria  Musculoskeletal: Negative for myalgias. Skin: Negative for rash  Heme: No problems bleeding. Neurological: Negative for speech change and focal weakness. PHYSICAL EXAM:     Physical Exam:  Visit Vitals  /80 (BP 1 Location: Left upper arm, BP Patient Position: Sitting, BP Cuff Size: Adult)   Pulse 67   Ht 5' 4\" (1.626 m)   Wt 171 lb (77.6 kg)   SpO2 97%   BMI 29.35 kg/m²     Patient appears generally well, mood and affect are appropriate and pleasant. HEENT:  Hearing intact, non-icteric, normocephalic, atraumatic. Neck Exam: Supple, No JVD or carotid bruits. Lung Exam: Clear to auscultation, even breath sounds.    Cardiac Exam: Regular rate and rhythm with 2/6 systolic murmur  Abdomen: Soft, non-tender, normal bowel sounds. No bruits or masses. Extremities: Moves all ext well. No lower extremity edema. MSKTL: Overall good ROM ext  Skin: No significant rashes  Psych: Appropriate affect  Neuro - Grossly intact      LABS / OTHER STUDIES reviewed:     Lab Results   Component Value Date/Time    Sodium 144 06/10/2022 02:29 PM    Potassium 4.9 06/10/2022 02:29 PM    Chloride 103 06/10/2022 02:29 PM    CO2 23 06/10/2022 02:29 PM    Anion gap 9 03/28/2021 10:23 PM    Glucose 106 (H) 06/10/2022 02:29 PM    BUN 13 06/10/2022 02:29 PM    Creatinine 0.97 06/10/2022 02:29 PM    BUN/Creatinine ratio 13 06/10/2022 02:29 PM    GFR est AA >60 03/28/2021 10:23 PM    GFR est non-AA 54 (L) 03/28/2021 10:23 PM    Calcium 10.4 (H) 06/10/2022 02:29 PM    Bilirubin, total 0.8 06/10/2022 02:29 PM    Alk.  phosphatase 63 06/10/2022 02:29 PM    Protein, total 7.4 06/10/2022 02:29 PM    Albumin 4.8 (H) 06/10/2022 02:29 PM    Globulin 3.6 03/28/2021 10:23 PM    A-G Ratio 1.8 06/10/2022 02:29 PM    ALT (SGPT) 40 (H) 06/10/2022 02:29 PM    AST (SGOT) 50 (H) 06/10/2022 02:29 PM     Lab Results   Component Value Date/Time    WBC 3.6 06/10/2022 02:29 PM    HGB 14.8 06/10/2022 02:29 PM    HCT 43.7 06/10/2022 02:29 PM    PLATELET 182 05/19/1660 02:29 PM    MCV 89 06/10/2022 02:29 PM       Lab Results   Component Value Date/Time    Cholesterol, total 314 (H) 06/20/2020 03:34 AM    Cholesterol, Total 315 (H) 06/10/2022 02:29 PM    HDL Cholesterol 41 06/20/2020 03:34 AM    LDL, calculated 202.6 (H) 06/20/2020 03:34 AM    VLDL, calculated 70.4 06/20/2020 03:34 AM    Triglyceride 352 (H) 06/20/2020 03:34 AM    CHOL/HDL Ratio 7.7 (H) 06/20/2020 03:34 AM     Component      Latest Ref Rng & Units 6/10/2022           2:29 PM   LDL-P      <1,000 nmol/L 3,223 (H)   LDL-C(NIH CALC)      0 - 99 mg/dL 221 (H)   HDL-C      >39 mg/dL 47   Triglycerides      0 - 149 mg/dL 233 (H)   Cholesterol, total      100 - 199 mg/dL 315 (H) HDL-P (Total)      >=30.5 umol/L 33.2   Small LDL-P      <=527 nmol/L 1,749 (H)   LDL size      >20.5 nm 20.8   LP-IR SCORE      <=45 76 (H)       Labs 1/22 - TSH 0.088, T4 11, CBC OK, CMP Ok, chol 339, , , HDL 56        CARDIAC DIAGNOSTICS:     Cardiac Evaluation Includes:  I reviewed the results below.      EKG 6/19/20 - Afib with RVR, , St depression V2-V4   EKG 6/19/20 later in day - sinus cynthia, otherwise normal   EKG 3/28/21 - Afib with RVR, , NSST changes   EKG 3/28/21 # 2 - NSR, possible LAE         Stress test in 2020 at [facility] (Dr. Gildardo Enriquez) - was OK per patient      Echo 6/19/20 - LVEF 50-55%, inferolateral and anterolateral HK, mild LAE, MAC and mild MR, AV calcification      Cardiac Cath 6/19/20 - LM 30%, LAD 30%, pLCX 100%,  RCA: subselective inj; MLI - not well visualized.  LVEDP: 9   --->  FAM 2.5 by 33 to LCX     Event Monitor 8/1/20 - 8/30 - normal      Colonoscopy 5/21 - diverticulosis     Echo 6/15/21 -  LVEF 60-65%, MAC, mild AS        ASSESSMENT AND PLAN:     Assessment and Plan:    1) CAD  - presented with CP 6/19/20 and was in afib with RVR and ruled in for NSTEMI   - Cardiac cath 6/19/20 ---> FAM to 100% pLCX stenosis    - See below regarding statin   - Continue BB  - She is doing well without further chest pain (no exertional CP)   - Continue OAC     2) PAF  - Arrived with Afib with RVR 6/19/20 in setting of NSTEMI - converted to NSR spontaneously same day  - Had another ED visit on 3/28/21 with Afib with RVR - converted to sinus in ED  - Continue Eliquis 5 mg BID long term   - continue BB but switch metoprolol to Bystolic      3) Dyslipidemia   - LDL was 202 on 6/20/20   - she has been intolerant of multiple statins (muscle pain)   - Cont Zetia 10 mg daily   - On 6/16/22 - Lipids very high ---> she is finally agreeable to start Rapetha  - recheck labs in 3 months      4) HTN  - she was unable to tolerate losartan (made her \"sick\")   - BP OK ---> continue meds     5) AV calcification / Mild AS   - Echo 6/15/21 -  LVEF 60-65%, MAC, mild AS  - follow on serial studies - recheck echo in 1 year     6) Neck / shoulder pain / HA  - seeing a chiropractor and doing better     7) Thyroid disease   - TSH 0.05 --> asked her to work with PCP      7) See NP in 3 months.  had a cardiac arrest 6/30/20 and then went to [place] (she did CPR).          Marguerite Youssef MD, [address], [address]  [address] [place] [address].  [address] [place] [address]  Ph: [phone]    [phone]

## 2022-06-16 NOTE — PROGRESS NOTES
PA submitted on ASP64    Key: [id number]  Med: [address] Reference Number: [id number]. REPATHA SURE INJ 140MG/ML is approved through 12/16/2022. Your patient may now fill this prescription and it will be covered. \"

## 2022-06-17 ENCOUNTER — APPOINTMENT (OUTPATIENT)
Dept: GENERAL RADIOLOGY | Age: 72
End: 2022-06-17
Attending: EMERGENCY MEDICINE
Payer: MEDICARE

## 2022-06-17 ENCOUNTER — HOSPITAL ENCOUNTER (EMERGENCY)
Age: 72
Discharge: HOME | End: 2022-06-17
Attending: EMERGENCY MEDICINE
Payer: MEDICARE

## 2022-06-17 VITALS
HEART RATE: 75 BPM | BODY MASS INDEX: 29.19 KG/M2 | HEIGHT: 64 IN | DIASTOLIC BLOOD PRESSURE: 58 MMHG | SYSTOLIC BLOOD PRESSURE: 129 MMHG | RESPIRATION RATE: 15 BRPM | OXYGEN SATURATION: 94 % | WEIGHT: 171 LBS | TEMPERATURE: 97.8 F

## 2022-06-17 DIAGNOSIS — R07.89 ATYPICAL CHEST PAIN: Primary | ICD-10-CM

## 2022-06-17 DIAGNOSIS — K21.9 GASTROESOPHAGEAL REFLUX DISEASE, UNSPECIFIED WHETHER ESOPHAGITIS PRESENT: ICD-10-CM

## 2022-06-17 LAB
ALBUMIN SERPL-MCNC: 3.3 G/DL (ref 3.5–5)
ALBUMIN/GLOB SERPL: 1 {RATIO} (ref 1.1–2.2)
ALP SERPL-CCNC: 51 U/L (ref 45–117)
ALT SERPL-CCNC: 44 U/L (ref 12–78)
ANION GAP SERPL CALC-SCNC: 9 MMOL/L (ref 5–15)
AST SERPL-CCNC: 44 U/L (ref 15–37)
BASOPHILS # BLD: 0 K/UL (ref 0–0.1)
BASOPHILS NFR BLD: 1 % (ref 0–1)
BILIRUB SERPL-MCNC: 0.5 MG/DL (ref 0.2–1)
BUN SERPL-MCNC: 17 MG/DL (ref 6–20)
BUN/CREAT SERPL: 18 (ref 12–20)
CALCIUM SERPL-MCNC: 9.4 MG/DL (ref 8.5–10.1)
CHLORIDE SERPL-SCNC: 107 MMOL/L (ref 97–108)
CO2 SERPL-SCNC: 25 MMOL/L (ref 21–32)
COMMENT, HOLDF: NORMAL
CREAT SERPL-MCNC: 0.96 MG/DL (ref 0.55–1.02)
D DIMER PPP FEU-MCNC: 0.43 MG/L FEU (ref 0–0.65)
DIFFERENTIAL METHOD BLD: NORMAL
EOSINOPHIL # BLD: 0.2 K/UL (ref 0–0.4)
EOSINOPHIL NFR BLD: 5 % (ref 0–7)
ERYTHROCYTE [DISTWIDTH] IN BLOOD BY AUTOMATED COUNT: 12.4 % (ref 11.5–14.5)
GLOBULIN SER CALC-MCNC: 3.2 G/DL (ref 2–4)
GLUCOSE SERPL-MCNC: 136 MG/DL (ref 65–100)
HCT VFR BLD AUTO: 40.5 % (ref 35–47)
HGB BLD-MCNC: 13.5 G/DL (ref 11.5–16)
IMM GRANULOCYTES # BLD AUTO: 0 K/UL (ref 0–0.04)
IMM GRANULOCYTES NFR BLD AUTO: 0 % (ref 0–0.5)
LIPASE SERPL-CCNC: 173 U/L (ref 73–393)
LYMPHOCYTES # BLD: 1.6 K/UL (ref 0.8–3.5)
LYMPHOCYTES NFR BLD: 40 % (ref 12–49)
MCH RBC QN AUTO: 29.5 PG (ref 26–34)
MCHC RBC AUTO-ENTMCNC: 33.3 G/DL (ref 30–36.5)
MCV RBC AUTO: 88.6 FL (ref 80–99)
MONOCYTES # BLD: 0.3 K/UL (ref 0–1)
MONOCYTES NFR BLD: 8 % (ref 5–13)
NEUTS SEG # BLD: 1.9 K/UL (ref 1.8–8)
NEUTS SEG NFR BLD: 46 % (ref 32–75)
NRBC # BLD: 0 K/UL (ref 0–0.01)
NRBC BLD-RTO: 0 PER 100 WBC
PLATELET # BLD AUTO: 192 K/UL (ref 150–400)
PMV BLD AUTO: 10.3 FL (ref 8.9–12.9)
POTASSIUM SERPL-SCNC: 3.6 MMOL/L (ref 3.5–5.1)
PROT SERPL-MCNC: 6.5 G/DL (ref 6.4–8.2)
RBC # BLD AUTO: 4.57 M/UL (ref 3.8–5.2)
SAMPLES BEING HELD,HOLD: NORMAL
SODIUM SERPL-SCNC: 141 MMOL/L (ref 136–145)
TROPONIN-HIGH SENSITIVITY: 18 NG/L (ref 0–51)
TROPONIN-HIGH SENSITIVITY: 24 NG/L (ref 0–51)
WBC # BLD AUTO: 4 K/UL (ref 3.6–11)

## 2022-06-17 PROCEDURE — 99285 EMERGENCY DEPT VISIT HI MDM: CPT

## 2022-06-17 PROCEDURE — 80053 COMPREHEN METABOLIC PANEL: CPT

## 2022-06-17 PROCEDURE — 71045 X-RAY EXAM CHEST 1 VIEW: CPT

## 2022-06-17 PROCEDURE — 85025 COMPLETE CBC W/AUTO DIFF WBC: CPT

## 2022-06-17 PROCEDURE — 36415 COLL VENOUS BLD VENIPUNCTURE: CPT

## 2022-06-17 PROCEDURE — 84484 ASSAY OF TROPONIN QUANT: CPT

## 2022-06-17 PROCEDURE — 74011250637 HC RX REV CODE- 250/637: Performed by: EMERGENCY MEDICINE

## 2022-06-17 PROCEDURE — 83690 ASSAY OF LIPASE: CPT

## 2022-06-17 PROCEDURE — 93005 ELECTROCARDIOGRAM TRACING: CPT

## 2022-06-17 PROCEDURE — 85379 FIBRIN DEGRADATION QUANT: CPT

## 2022-06-17 RX ORDER — OMEPRAZOLE 40 MG/1
40 CAPSULE, DELAYED RELEASE ORAL DAILY
Qty: 30 CAPSULE | Refills: 0 | Status: SHIPPED | OUTPATIENT
Start: 2022-06-17 | End: 2022-09-16

## 2022-06-17 RX ORDER — ASPIRIN 325 MG
325 TABLET ORAL ONCE
Status: COMPLETED | OUTPATIENT
Start: 2022-06-17 | End: 2022-06-17

## 2022-06-17 RX ADMIN — NITROGLYCERIN 1 INCH: 20 OINTMENT TOPICAL at 04:35

## 2022-06-17 RX ADMIN — ASPIRIN 325 MG: 325 TABLET ORAL at 04:35

## 2022-06-17 NOTE — ED PROVIDER NOTES
77-year-old female with a past medical history significant for coronary disease, NSTEMI, dyslipidemia, hypertension, hypothyroidism, paroxysmal atrial fibrillation, anxiety, hypercholesterolemia and statin intolerance who presents to the ER and states that she woke up approximately 2 hours ago with chest discomfort that she described as pressure and tightness with lots of belching lasted approximately couple hours. Then the patient decided taking her nitroglycerin approximately half an hour prior to arrival to the ER and his symptom has improved since taking them. She took approximately 2 pills. She is currently pain-free and comfortable. She denies any nausea or vomiting, fever and chills, headache, neck and back pain, nausea, vomiting, diarrhea, constipation, dysuria, dizziness, extremity weakness or numbness, sick contact, skin rash or recent travel. Past Medical History:   Diagnosis Date    CAD (coronary artery disease)     NSTEMI - PCI to LCX on 6/19/20     Dyslipidemia     Hypertension     Hypothyroid     NSTEMI (non-ST elevated myocardial infarction) ([facility] [address]     NSTEMI 6/19/20    PAF (paroxysmal atrial fibrillation) ([facility] [address]     Arrived with Afib with RVR 6/19/20 - converted to NSR spontaneously same day    Psychiatric disorder     anxiety    Statin intolerance        Past Surgical History:   Procedure Laterality Date    COLONOSCOPY N/A 5/12/2021    COLONOSCOPY performed by Herminia Osgood, MD at [facility] HX CORONARY STENT PLACEMENT      HX GYN      tubal ligation    [place] CARDIAC SURG PROCEDURE UNLIST      PCI to Cx X 2 FAM         Family History:   Problem Relation Age of Onset    Heart Disease Mother     Heart Disease Father     Cancer Daughter        Social History     Socioeconomic History    Marital status:      Spouse name: Zeeshan De La Fuente Number of children: 2    Years of education: 12    Highest education level:  Bachelor's degree (e.g., BA, AB, BS)   Occupational History    Not on file   Tobacco Use    Smoking status: Never Smoker    Smokeless tobacco: Never Used   Vaping Use    Vaping Use: Never used   Substance and Sexual Activity    Alcohol use: Yes     Comment: rarely    Drug use: Never    Sexual activity: Not Currently   Other Topics Concern     Service No    Blood Transfusions No    Caffeine Concern No    Occupational Exposure No    Hobby Hazards No    Sleep Concern No    Stress Concern No    Weight Concern No    Special Diet No    Back Care No    Exercise No    Bike Helmet No    Seat Belt No    Self-Exams No   Social History Narrative    Not on file     Social Determinants of Health     Financial Resource Strain:     Difficulty of Paying Living Expenses: Not on file   Food Insecurity:     Worried About Running Out of Food in the Last Year: Not on file    Og of Food in the Last Year: Not on file   Transportation Needs:     Lack of Transportation (Medical): Not on file    Lack of Transportation (Non-Medical):  Not on file   Physical Activity:     Days of Exercise per Week: Not on file    Minutes of Exercise per Session: Not on file   Stress:     Feeling of Stress : Not on file   Social Connections:     Frequency of Communication with Friends and Family: Not on file    Frequency of Social Gatherings with Friends and Family: Not on file    Attends Hindu Services: Not on file    Active Member of [address] [place] [address] or Organizations: Not on file    Attends Club or Organization Meetings: Not on file    Marital Status: Not on file   Intimate Partner Violence:     Fear of Current or Ex-Partner: Not on file    Emotionally Abused: Not on file    Physically Abused: Not on file    Sexually Abused: Not on file   Housing Stability:     Unable to Pay for Housing in the Last Year: Not on file    Number of [place] in the Last Year: Not on file    Unstable Housing in the Last Year: Not on file         ALLERGIES: Adhesive, Neosporin plus [fbcjq-omurk-whcdrzm-pramoxine], and Pcn [penicillins]    Review of Systems   All other systems reviewed and are negative. There were no vitals filed for this visit. Physical Exam  Vitals and nursing note reviewed. Exam conducted with a chaperone present. CONSTITUTIONAL: Well-appearing; well-nourished; in no apparent distress  HEAD: Normocephalic; atraumatic  EYES: PERRL; EOM intact; conjunctiva and sclera are clear bilaterally. ENT: No rhinorrhea; normal pharynx with no tonsillar hypertrophy; mucous membranes pink/moist, no erythema, no exudate. NECK: Supple; non-tender; no cervical lymphadenopathy  CARD: Normal S1, S2; no murmurs, rubs, or gallops. Regular rate and rhythm. RESP: Normal respiratory effort; breath sounds clear and equal bilaterally; no wheezes, rhonchi, or rales. ABD: Normal bowel sounds; non-distended; non-tender; no palpable organomegaly, no masses, no bruits. Back Exam: Normal inspection; no vertebral point tenderness, no CVA tenderness. Normal range of motion. EXT: Normal ROM in all four extremities; non-tender to palpation; no swelling or deformity; distal pulses are normal, no edema. SKIN: Warm; dry; no rash. NEURO:Alert and oriented x 3, coherent, ASHLEY-XII grossly intact, sensory and motor are non-focal.        MDM  Number of Diagnoses or Management Options  Diagnosis management comments: Assessment: Differential diagnosis include ACS/VTE/CHF/gastritis rule out electrolyte abnormality and dehydration. The patient is collectively well and back to her baseline at this time. She denies any further discomfort. Plan: Lab/EKG/chest x-ray/aspirin/inch Nitropaste/consult cardiology/education, reassurance, symptomatic treatment/ Monitor and Reevaluate.          Amount and/or Complexity of Data Reviewed  Clinical lab tests: ordered and reviewed  Tests in the radiology section of CPT®: ordered and reviewed  Tests in the medicine section of CPT®: reviewed and ordered  Discussion of test results with the performing providers: yes  Decide to obtain previous medical records or to obtain history from someone other than the patient: yes  Obtain history from someone other than the patient: yes  Review and summarize past medical records: yes  Discuss the patient with other providers: yes  Independent visualization of images, tracings, or specimens: yes    Risk of Complications, Morbidity, and/or Mortality  Presenting problems: moderate  Diagnostic procedures: moderate  Management options: moderate    Patient Progress  Patient progress: stable         Procedures    ED EKG interpretation:  Rhythm: normal sinus rhythm; and regular . Rate (approx.): 82; Axis: normal; P wave: normal; QRS interval: normal ; ST/T wave: normal; in  Lead: Diffusely; Other findings: borderline ekg. This EKG was interpreted by Efrain Rodriguez MD,ED Provider. 03:58 AM    XRAY INTERPRETATION (ED MD)  Chest Xray  No acute process seen. Normal heart size. No bony abnormalities. No infiltrate. Elidia Ernst MD 4:01 AM    Repeat ED EKG interpretation:  Rhythm: normal sinus rhythm; and regular . Rate (approx.): 78; Axis: left axis deviation; P wave: normal; QRS interval: normal ; ST/T wave: normal; in  Lead: Diffusely; Other findings: borderline ekg. when compared, this EKG is unchanged from the one performed 3 hours ago. This EKG was interpreted by Efrain Rodrigeuz MD,ED Provider. 07:00AM      Progress Note:   Pt has been reexamined by Efrain Rodriguez MD. Pt is feeling much better. Symptoms have improved. All available results have been reviewed with pt and any available family. Pt understands sx, dx, and tx in ED. Care plan has been outlined and questions have been answered. Pt is ready to go home. Will send home on chest pain and GERD instruction. Outpatient referral with PCP as needed. Written by Efrain Rodriguez MD,7:15 AM    .   .

## 2022-06-17 NOTE — ED TRIAGE NOTES
Pt arrived with c/o of sternal chest pain, took 2 nitro at 0330.  Describes pain as tight   Pt denies N/V and SOB

## 2022-06-20 LAB
ATRIAL RATE: 78 BPM
ATRIAL RATE: 82 BPM
CALCULATED P AXIS, ECG09: 40 DEGREES
CALCULATED P AXIS, ECG09: 45 DEGREES
CALCULATED R AXIS, ECG10: 0 DEGREES
CALCULATED R AXIS, ECG10: 3 DEGREES
CALCULATED T AXIS, ECG11: 26 DEGREES
CALCULATED T AXIS, ECG11: 4 DEGREES
DIAGNOSIS, 93000: NORMAL
DIAGNOSIS, 93000: NORMAL
P-R INTERVAL, ECG05: 144 MS
P-R INTERVAL, ECG05: 150 MS
Q-T INTERVAL, ECG07: 378 MS
Q-T INTERVAL, ECG07: 404 MS
QRS DURATION, ECG06: 68 MS
QRS DURATION, ECG06: 72 MS
QTC CALCULATION (BEZET), ECG08: 441 MS
QTC CALCULATION (BEZET), ECG08: 460 MS
VENTRICULAR RATE, ECG03: 78 BPM
VENTRICULAR RATE, ECG03: 82 BPM

## 2022-09-02 NOTE — TELEPHONE ENCOUNTER
Refill per VO of Dr. Caryle Grief  Last appt: 6/16/2022  Future Appointments   Date Time Provider Jacqueline Huynh   9/16/2022  1:00 PM Gricel Cadet NP [facility] AMB       Requested Prescriptions     Pending Prescriptions Disp Refills    amLODIPine (NORVASC) 2.5 mg tablet 90 Tablet 3     Sig: Take 1 Tablet by mouth daily.

## 2022-09-06 RX ORDER — AMLODIPINE BESYLATE 2.5 MG/1
2.5 TABLET ORAL DAILY
Qty: 90 TABLET | Refills: 3 | Status: SHIPPED | OUTPATIENT
Start: 2022-09-06 | End: 2022-09-14

## 2022-09-14 LAB
ALBUMIN SERPL-MCNC: 4.6 G/DL (ref 3.7–4.7)
ALBUMIN/GLOB SERPL: 1.8 {RATIO} (ref 1.2–2.2)
ALP SERPL-CCNC: 57 IU/L (ref 44–121)
ALT SERPL-CCNC: 45 IU/L (ref 0–32)
AST SERPL-CCNC: 50 IU/L (ref 0–40)
BILIRUB SERPL-MCNC: 0.8 MG/DL (ref 0–1.2)
BUN SERPL-MCNC: 14 MG/DL (ref 8–27)
BUN/CREAT SERPL: 12 (ref 12–28)
CALCIUM SERPL-MCNC: 10.7 MG/DL (ref 8.7–10.3)
CHLORIDE SERPL-SCNC: 101 MMOL/L (ref 96–106)
CHOLEST SERPL-MCNC: 297 MG/DL (ref 100–199)
CO2 SERPL-SCNC: 20 MMOL/L (ref 20–29)
CREAT SERPL-MCNC: 1.13 MG/DL (ref 0.57–1)
EGFR: 51 ML/MIN/1.73
GLOBULIN SER CALC-MCNC: 2.6 G/DL (ref 1.5–4.5)
GLUCOSE SERPL-MCNC: 132 MG/DL (ref 65–99)
HDL SERPL-SCNC: 38.1 UMOL/L
HDLC SERPL-MCNC: 63 MG/DL
IMP & REVIEW OF LAB RESULTS: NORMAL
INTERPRETATION: NORMAL
LDL SERPL QN: 21.3 NM
LDL SERPL-SCNC: 2482 NMOL/L
LDL SMALL SERPL-SCNC: 1054 NMOL/L
LDLC SERPL CALC-MCNC: 192 MG/DL (ref 0–99)
LP-IR SCORE SERPL: 47
POTASSIUM SERPL-SCNC: 4.4 MMOL/L (ref 3.5–5.2)
PROT SERPL-MCNC: 7.2 G/DL (ref 6–8.5)
SODIUM SERPL-SCNC: 141 MMOL/L (ref 134–144)
TRIGL SERPL-MCNC: 219 MG/DL (ref 0–149)
TSH SERPL DL<=0.005 MIU/L-ACNC: 0.54 UIU/ML (ref 0.45–4.5)

## 2022-09-14 RX ORDER — AMLODIPINE BESYLATE 2.5 MG/1
2.5 TABLET ORAL DAILY
Qty: 90 TABLET | Refills: 3 | Status: SHIPPED | OUTPATIENT
Start: 2022-09-14

## 2022-09-14 NOTE — TELEPHONE ENCOUNTER
Refill per VO of Dr. Efren Almaraz  Last appt: 6/16/2022  Future Appointments   Date Time Provider Jacqueline Huynh   9/16/2022  1:00 PM Estrella Trevino NP [facility]       Requested Prescriptions     Signed Prescriptions Disp Refills    amLODIPine (NORVASC) 2.5 mg tablet 90 Tablet 3     Sig: TAKE 1 TABLET BY MOUTH  DAILY     Authorizing Provider: Tiara Rose     Ordering User: Vito Baltazar

## 2022-09-16 ENCOUNTER — TELEPHONE (OUTPATIENT)
Dept: CARDIOLOGY CLINIC | Age: 72
End: 2022-09-16

## 2022-09-16 ENCOUNTER — OFFICE VISIT (OUTPATIENT)
Dept: CARDIOLOGY CLINIC | Age: 72
End: 2022-09-16
Payer: MEDICARE

## 2022-09-16 VITALS
DIASTOLIC BLOOD PRESSURE: 70 MMHG | BODY MASS INDEX: 30.39 KG/M2 | WEIGHT: 178 LBS | HEART RATE: 72 BPM | OXYGEN SATURATION: 97 % | SYSTOLIC BLOOD PRESSURE: 132 MMHG | HEIGHT: 64 IN

## 2022-09-16 DIAGNOSIS — I10 ESSENTIAL HYPERTENSION: ICD-10-CM

## 2022-09-16 DIAGNOSIS — E78.5 DYSLIPIDEMIA: ICD-10-CM

## 2022-09-16 DIAGNOSIS — I25.2 HISTORY OF NON-ST ELEVATION MYOCARDIAL INFARCTION (NSTEMI): Primary | ICD-10-CM

## 2022-09-16 DIAGNOSIS — I48.0 PAF (PAROXYSMAL ATRIAL FIBRILLATION) (HCC): ICD-10-CM

## 2022-09-16 DIAGNOSIS — I25.10 CORONARY ARTERY DISEASE INVOLVING NATIVE CORONARY ARTERY OF NATIVE HEART WITHOUT ANGINA PECTORIS: ICD-10-CM

## 2022-09-16 DIAGNOSIS — I35.0 NONRHEUMATIC AORTIC VALVE STENOSIS: Primary | ICD-10-CM

## 2022-09-16 DIAGNOSIS — Z78.9 STATIN INTOLERANCE: ICD-10-CM

## 2022-09-16 DIAGNOSIS — I25.10 CORONARY ARTERY DISEASE INVOLVING NATIVE CORONARY ARTERY OF NATIVE HEART, UNSPECIFIED WHETHER ANGINA PRESENT: ICD-10-CM

## 2022-09-16 PROCEDURE — 1123F ACP DISCUSS/DSCN MKR DOCD: CPT | Performed by: NURSE PRACTITIONER

## 2022-09-16 PROCEDURE — G8432 DEP SCR NOT DOC, RNG: HCPCS | Performed by: NURSE PRACTITIONER

## 2022-09-16 PROCEDURE — 1090F PRES/ABSN URINE INCON ASSESS: CPT | Performed by: NURSE PRACTITIONER

## 2022-09-16 PROCEDURE — 3017F COLORECTAL CA SCREEN DOC REV: CPT | Performed by: NURSE PRACTITIONER

## 2022-09-16 PROCEDURE — G8754 DIAS BP LESS 90: HCPCS | Performed by: NURSE PRACTITIONER

## 2022-09-16 PROCEDURE — 99214 OFFICE O/P EST MOD 30 MIN: CPT | Performed by: NURSE PRACTITIONER

## 2022-09-16 PROCEDURE — G8536 NO DOC ELDER MAL SCRN: HCPCS | Performed by: NURSE PRACTITIONER

## 2022-09-16 PROCEDURE — G8752 SYS BP LESS 140: HCPCS | Performed by: NURSE PRACTITIONER

## 2022-09-16 PROCEDURE — 1101F PT FALLS ASSESS-DOCD LE1/YR: CPT | Performed by: NURSE PRACTITIONER

## 2022-09-16 PROCEDURE — G8400 PT W/DXA NO RESULTS DOC: HCPCS | Performed by: NURSE PRACTITIONER

## 2022-09-16 PROCEDURE — G8427 DOCREV CUR MEDS BY ELIG CLIN: HCPCS | Performed by: NURSE PRACTITIONER

## 2022-09-16 PROCEDURE — G8417 CALC BMI ABV UP PARAM F/U: HCPCS | Performed by: NURSE PRACTITIONER

## 2022-09-16 NOTE — TELEPHONE ENCOUNTER
Dr. Alejandro Cavazos -     Pt with hx of CAD s/p PCI in 2020- statin intolerant, on zetia    Took repatha x3mo but unable to afford long term. In 'donut hole' and very concerned about med cost todays- what do you think about starting Leqvio? Cost should be much less as billed differently through Medicare as an in office procedure (administered at IV infusion center). Thoughts? Reviewed with pt today - she is agreeable.      Component      Latest Ref Rng & Units 9/13/2022 6/10/2022          12:34 PM  2:29 PM   LDL-P      <1,000 nmol/L 2,482 (H) 3,223 (H)   LDL-C(NIH CALC)      0 - 99 mg/dL 192 (H) 221 (H)   HDL-C      >39 mg/dL 63 47   Triglycerides      0 - 149 mg/dL 219 (H) 233 (H)   Cholesterol, total      100 - 199 mg/dL 297 (H) 315 (H)   HDL-P (Total)      >=30.5 umol/L 38.1 33.2   Small LDL-P      <=527 nmol/L 1,054 (H) 1,749 (H)   LDL size      >20.5 nm 21.3 20.8   LP-IR SCORE      <=45 47 (H) 76 (H)
Our infusion center is doing injections now. Kalie Casillas - I will send you my note by end of day -   Can you contact Asia Woods at [facility] to help you arrange by chance? Dixie's cell phone is [phone]   Or email at [email or web address]    Rx -   Leqvio 284mg as a single SubQ injection - repeat after 3mo, then q6mo thereafter. Would arrange fu visit with Dr. Awa Hughes prior to 2nd dose at 3m. Dx - CAD, HLD, hx of NSTEMI, statin intolerance.
No

## 2022-09-16 NOTE — PROGRESS NOTES
Martinez Lu, MILTON  [address], [address]  [place], [address] [facility]    Office [phone]  Fax [phone]          Patient: Ellen Man  : 1950      Today's Date: 2022          HISTORY OF PRESENT ILLNESS:     History of Present Illness:  Here for fu. Doing well recently. One episode of fluttering heart beat that lasted for about an hour. Had associated chest discomfort and took nitro with quick relief. No similar episodes. No exertional issues. Patient denies any exertional chest pain, breathing changes, palpitations, syncope, orthopnea, edema, or paroxysmal nocturnal dyspnea. Stable FAULKNER - class 2. Was unable to afford repatha (~100 per month) - pt self dc'd. PAST MEDICAL HISTORY:     Past Medical History:   Diagnosis Date    CAD (coronary artery disease)     NSTEMI - PCI to LCX on 20     Dyslipidemia     Hypertension     Hypothyroid     NSTEMI (non-ST elevated myocardial infarction) ([facility] [address]     NSTEMI 20    PAF (paroxysmal atrial fibrillation) ([facility] [address]     Arrived with Afib with RVR 20 - converted to NSR spontaneously same day    Psychiatric disorder     anxiety    Statin intolerance        Past Surgical History:   Procedure Laterality Date    COLONOSCOPY N/A 2021    COLONOSCOPY performed by Cholo Rose MD at [facility]    HX CORONARY STENT PLACEMENT      HX GYN      tubal ligation    HI CARDIAC SURG PROCEDURE UNLIST      PCI to Cx X 2 FAM             MEDICATIONS:      Current Outpatient Medications on File Prior to Visit   Medication Sig Dispense Refill    amLODIPine (NORVASC) 2.5 mg tablet TAKE 1 TABLET BY MOUTH  DAILY 90 Tablet 3    nebivoloL (BYSTOLIC) 10 mg tablet Take 1 Tablet by mouth daily.  30 Tablet 12    ezetimibe (ZETIA) 10 mg tablet TAKE 1 TABLET BY MOUTH  DAILY 90 Tablet 1    Eliquis 5 mg tablet TAKE 1 TABLET BY MOUTH  TWICE DAILY 180 Tablet 3    cyclobenzaprine (FLEXERIL) 10 mg tablet Take  by mouth three (3) times daily as needed for Muscle Spasm(s). omega 3-DHA-EPA-fish oil 1,000 mg (120 mg-180 mg) capsule Take 3 Caps by mouth daily. cholecalciferol (VITAMIN D3) (5000 Units/125 mcg) tab tablet Take 5,000 Units by mouth daily. ascorbic acid, vitamin C, (VITAMIN C) 1,000 mg tablet Take 1,000 mg by mouth daily. liothyronine (CYTOMEL) 5 mcg tablet Take 2.5 mcg by mouth daily. levothyroxine (SYNTHROID) 125 mcg tablet Take 125 mcg by mouth Daily (before breakfast). nitroglycerin (NITROSTAT) 0.4 mg SL tablet 0.4 mg by SubLINGual route every five (5) minutes as needed for Chest Pain. Up to 3 doses. [DISCONTINUED] omeprazole (PRILOSEC) 40 mg capsule Take 1 Capsule by mouth daily. (Patient not taking: Reported on 9/16/2022) 30 Capsule 0    [DISCONTINUED] evolocumab (REPATHA SURECLICK) pen injection 1 mL by SubCUTAneous route every fourteen (14) days. (Patient not taking: Reported on 9/16/2022) 7 Pen 3     No current facility-administered medications on file prior to visit. Allergies   Allergen Reactions    Adhesive Rash    Neosporin Plus [Yxyal-Vqocb-Pnulxex-Pramoxine] Rash    Pcn [Penicillins] Rash       Childhood rash               SOCIAL HISTORY:      Social History            Tobacco Use    Smoking status: Never Smoker    Smokeless tobacco: Never Used   Vaping Use    Vaping Use: Never used   Substance Use Topics    Alcohol use: Yes       Comment: rarely    Drug use: Never            FAMILY HISTORY:            Family History   Problem Relation Age of Onset    Heart Disease Mother      Heart Disease Father      Cancer Daughter                 REVIEW OF SYMPTOMS:      Review of Symptoms:  Constitutional: Negative for fever, chills  HEENT: Negative for nosebleeds, tinnitus, and vision changes. Respiratory: Negative for cough, wheezing  Cardiovascular: Negative for orthopnea, claudication, syncope, and PND. Gastrointestinal: Negative for abdominal pain, diarrhea, melena.    Genitourinary: Negative for dysuria  Musculoskeletal: Negative for myalgias. Skin: Negative for rash  Heme: No problems bleeding. Neurological: Negative for speech change and focal weakness. PHYSICAL EXAM:      Physical Exam:  Visit Vitals  /70 (BP 1 Location: Left upper arm, BP Patient Position: Sitting, BP Cuff Size: Adult)   Pulse 72   Ht 5' 4\" (1.626 m)   Wt 178 lb (80.7 kg)   SpO2 97%   BMI 30.55 kg/m²     Patient appears generally well, mood and affect are appropriate and pleasant. HEENT:  Hearing intact, non-icteric, normocephalic, atraumatic. Neck Exam: Supple, No JVD or carotid bruits. Lung Exam: Clear to auscultation, even breath sounds. Cardiac Exam: Regular rate and rhythm with 2/6 systolic murmur  Abdomen: Soft, non-tender, normal bowel sounds. No bruits or masses. Extremities: Moves all ext well. No lower extremity edema. MSKTL: Overall good ROM ext  Skin: No significant rashes  Psych: Appropriate affect  Neuro - Grossly intact        LABS / OTHER STUDIES reviewed:            Lab Results   Component Value Date/Time     Sodium 144 06/10/2022 02:29 PM     Potassium 4.9 06/10/2022 02:29 PM     Chloride 103 06/10/2022 02:29 PM     CO2 23 06/10/2022 02:29 PM     Anion gap 9 03/28/2021 10:23 PM     Glucose 106 (H) 06/10/2022 02:29 PM     BUN 13 06/10/2022 02:29 PM     Creatinine 0.97 06/10/2022 02:29 PM     BUN/Creatinine ratio 13 06/10/2022 02:29 PM     GFR est AA >60 03/28/2021 10:23 PM     GFR est non-AA 54 (L) 03/28/2021 10:23 PM     Calcium 10.4 (H) 06/10/2022 02:29 PM     Bilirubin, total 0.8 06/10/2022 02:29 PM     Alk.  phosphatase 63 06/10/2022 02:29 PM     Protein, total 7.4 06/10/2022 02:29 PM     Albumin 4.8 (H) 06/10/2022 02:29 PM     Globulin 3.6 03/28/2021 10:23 PM     A-G Ratio 1.8 06/10/2022 02:29 PM     ALT (SGPT) 40 (H) 06/10/2022 02:29 PM     AST (SGOT) 50 (H) 06/10/2022 02:29 PM            Lab Results   Component Value Date/Time     WBC 3.6 06/10/2022 02:29 PM     HGB 14.8 06/10/2022 02:29 PM     HCT 43.7 06/10/2022 02:29 PM     PLATELET 989 39/20/0038 02:29 PM     MCV 89 06/10/2022 02:29 PM               Lab Results   Component Value Date/Time     Cholesterol, total 314 (H) 06/20/2020 03:34 AM     Cholesterol, Total 315 (H) 06/10/2022 02:29 PM     HDL Cholesterol 41 06/20/2020 03:34 AM     LDL, calculated 202.6 (H) 06/20/2020 03:34 AM     VLDL, calculated 70.4 06/20/2020 03:34 AM     Triglyceride 352 (H) 06/20/2020 03:34 AM     CHOL/HDL Ratio 7.7 (H) 06/20/2020 03:34 AM      Component      Latest Ref Rng & Units 9/13/2022 6/10/2022          12:34 PM  2:29 PM   LDL-P      <1,000 nmol/L 2,482 (H) 3,223 (H)   LDL-C(NIH CALC)      0 - 99 mg/dL 192 (H) 221 (H)   HDL-C      >39 mg/dL 63 47   Triglycerides      0 - 149 mg/dL 219 (H) 233 (H)   Cholesterol, total      100 - 199 mg/dL 297 (H) 315 (H)   HDL-P (Total)      >=30.5 umol/L 38.1 33.2   Small LDL-P      <=527 nmol/L 1,054 (H) 1,749 (H)   LDL size      >20.5 nm 21.3 20.8   LP-IR SCORE      <=45 47 (H) 76 (H)        Labs 1/22 - TSH 0.088, T4 11, CBC OK, CMP Ok, chol 339, , , HDL 56           CARDIAC DIAGNOSTICS:      Cardiac Evaluation Includes:  I reviewed the results below. EKG 6/19/20 - Afib with RVR, , St depression V2-V4   EKG 6/19/20 later in day - sinus cynthia, otherwise normal   EKG 3/28/21 - Afib with RVR, , NSST changes   EKG 3/28/21 # 2 - NSR, possible LAE         Stress test in 2020 at [facility] (Dr. Diane Galvin) - was OK per patient      Echo 6/19/20 - LVEF 50-55%, inferolateral and anterolateral HK, mild LAE, MAC and mild MR, AV calcification      Cardiac Cath 6/19/20 - LM 30%, LAD 30%, pLCX 100%,  RCA: subselective inj; MLI - not well visualized.   LVEDP: 9   --->  FAM 2.5 by 33 to LCX     Event Monitor 8/1/20 - 8/30 - normal      Colonoscopy 5/21 - diverticulosis      Echo 6/15/21 -  LVEF 60-65%, MAC, mild AS           ASSESSMENT AND PLAN:      Assessment and Plan:     1) CAD  - presented with CP 6/19/20 and was in afib with RVR and ruled in for NSTEMI   - Cardiac cath 6/19/20 ---> FAM to 100% pLCX stenosis    - See below regarding statin   - Continue BB and OAC  - symptomatically stable     2) PAF  - Arrived with Afib with RVR 6/19/20 in setting of NSTEMI - converted to NSR spontaneously same day  - Had another ED visit on 3/28/21 with Afib with RVR - converted to sinus in ED  - Continue Eliquis 5 mg BID long term, bystolic for rate control     3) Dyslipidemia   - LDL was 202 on 6/20/20   - she has been intolerant of multiple statins (muscle pain)   - Cont Zetia 10 mg daily   - Lipids cont to be vert high ---> intolerant to statins and unable to afford PCSK9i  - in donut hole and very concerned about med costs  - rec starting Leqvio therapy    4) HTN  - she was unable to tolerate losartan (made her \"sick\")   - BP OK ---> continue meds     5) AV calcification / Mild AS   - Echo 6/15/21 -  LVEF 60-65%, MAC, mild AS  - follow on serial studies - recheck echo at fu visit      Fu in 3mo or PRN - echo same day per above       had a cardiac arrest 6/30/20 and then went to [facility] (she did CPR).         Solange Mar, ANP

## 2022-09-16 NOTE — PATIENT INSTRUCTIONS
See Dr. Dwayne Jimenez in [facility] - echo same day   I think you would benefit from [facility] AND [facility]. AT [facility] - we will contact you about starting this med.

## 2022-09-16 NOTE — LETTER
9/19/2022    Patient: Neema Doyle   YOB: 1950   Date of Visit: 9/16/2022     Deon Gar MD  [address] [id number]  Via Fax: [phone]     Sue Santacruz MD  [address] [address]  Via In Basket    Dear MD Sue Stauffer MD,      Thank you for referring Ms. Neema Doyle to [facility] for evaluation. My notes for this consultation are attached. If you have questions, please do not hesitate to call me. I look forward to following your patient along with you.       Sincerely,    Jaja Butterfield NP

## 2022-09-16 NOTE — PROGRESS NOTES
Room: EP2     Took repatha x 3 and stopped due to cost    Visit Vitals  BP (!) 146/78 (BP 1 Location: Right arm, BP Patient Position: Sitting, BP Cuff Size: Adult)   Pulse 72   Ht 5' 4\" (1.626 m)   Wt 178 lb (80.7 kg)   SpO2 97%   BMI 30.55 kg/m²         Chest pain: no  Shortness of breath: no  Dizziness: no  Palpitations/Racing Heart: Flutter episode x hour (took nitro)  Swelling: no    New diagnosis/Surgeries since your last visit: no    Hospitalizations since your last visit: no    Refills: no

## 2022-09-23 RX ORDER — APIXABAN 5 MG/1
TABLET, FILM COATED ORAL
Qty: 180 TABLET | Refills: 3 | Status: SHIPPED | OUTPATIENT
Start: 2022-09-23

## 2022-09-23 NOTE — TELEPHONE ENCOUNTER
Refill per VO of Dr. Luz Henson  Last appt: 9/16/2022  Future Appointments   Date Time Provider Jacqueline Huynh   1/12/2023  4:20 PM Marianela Yun MD [facility] BS AMB       Requested Prescriptions     Signed Prescriptions Disp Refills    Eliquis 5 mg tablet 180 Tablet 3     Sig: TAKE 1 TABLET BY MOUTH  TWICE DAILY     Authorizing Provider: Nancy Spivey     Ordering User: Tejas Gamez

## 2022-10-26 ENCOUNTER — TELEPHONE (OUTPATIENT)
Dept: CARDIOLOGY CLINIC | Age: 72
End: 2022-10-26

## 2022-10-26 NOTE — TELEPHONE ENCOUNTER
[address] Lisa Lomas Said is calling on the behalf of [facility] because they need the patient address on the form in order for the form to be valid. The form is called the [facility] service center start form.     [phone] direct ext for Elidia Gastelum M-F 8am -5pm  8 am-8 pm hours [facility] standard time M-F

## 2022-11-15 RX ORDER — EZETIMIBE 10 MG/1
TABLET ORAL
Qty: 90 TABLET | Refills: 2 | Status: SHIPPED | OUTPATIENT
Start: 2022-11-15

## 2022-11-15 NOTE — TELEPHONE ENCOUNTER
Refill per VO of Dr. Renny Perkins  Last appt: 9/16/22  Future Appointments   Date Time Provider Jacqueline Huynh   1/12/2023  4:20 PM Chaparrita Hancock MD [facility] BS AMB       Requested Prescriptions     Signed Prescriptions Disp Refills    ezetimibe (ZETIA) 10 mg tablet 90 Tablet 2     Sig: TAKE 1 TABLET BY MOUTH  DAILY     Authorizing Provider: Leslie Persaud     Ordering User: Cristin Mishra Improved

## 2022-11-18 ENCOUNTER — DOCUMENTATION ONLY (OUTPATIENT)
Dept: CARDIOLOGY CLINIC | Age: 72
End: 2022-11-18

## 2022-11-25 ENCOUNTER — DOCUMENTATION ONLY (OUTPATIENT)
Dept: CARDIOLOGY CLINIC | Age: 72
End: 2022-11-25

## 2022-12-05 ENCOUNTER — DOCUMENTATION ONLY (OUTPATIENT)
Dept: CARDIOLOGY CLINIC | Age: 72
End: 2022-12-05

## 2022-12-05 NOTE — PROGRESS NOTES
Leqvio statement of benefits and application sent to scanning.   Pt decided to go with Repatha d/t cost.

## 2023-01-12 ENCOUNTER — OFFICE VISIT (OUTPATIENT)
Dept: CARDIOLOGY CLINIC | Age: 73
End: 2023-01-12
Payer: MEDICARE

## 2023-01-12 VITALS
SYSTOLIC BLOOD PRESSURE: 126 MMHG | WEIGHT: 178 LBS | OXYGEN SATURATION: 97 % | HEIGHT: 64 IN | HEART RATE: 68 BPM | BODY MASS INDEX: 30.39 KG/M2 | DIASTOLIC BLOOD PRESSURE: 68 MMHG

## 2023-01-12 DIAGNOSIS — I25.118 CORONARY ARTERY DISEASE OF NATIVE ARTERY OF NATIVE HEART WITH STABLE ANGINA PECTORIS (HCC): Primary | ICD-10-CM

## 2023-01-12 DIAGNOSIS — I35.0 NONRHEUMATIC AORTIC VALVE STENOSIS: ICD-10-CM

## 2023-01-12 DIAGNOSIS — E78.5 DYSLIPIDEMIA: ICD-10-CM

## 2023-01-12 DIAGNOSIS — I10 PRIMARY HYPERTENSION: ICD-10-CM

## 2023-01-12 DIAGNOSIS — I48.0 PAF (PAROXYSMAL ATRIAL FIBRILLATION) (HCC): ICD-10-CM

## 2023-01-12 RX ORDER — ROSUVASTATIN CALCIUM 5 MG/1
5 TABLET, COATED ORAL
Qty: 90 TABLET | Refills: 3 | Status: SHIPPED | OUTPATIENT
Start: 2023-01-12 | End: 2023-01-12 | Stop reason: SDUPTHER

## 2023-01-12 RX ORDER — ROSUVASTATIN CALCIUM 5 MG/1
5 TABLET, COATED ORAL
Qty: 90 TABLET | Refills: 3 | Status: SHIPPED | OUTPATIENT
Start: 2023-01-12

## 2023-01-12 RX ORDER — AMPICILLIN TRIHYDRATE 250 MG
600 CAPSULE ORAL DAILY
COMMUNITY

## 2023-01-12 RX ORDER — ACETAMINOPHEN 160 MG/5ML
200 SUSPENSION, ORAL (FINAL DOSE FORM) ORAL DAILY
COMMUNITY

## 2023-01-12 NOTE — PROGRESS NOTES
Jessica Boggs MD. [facility]              Patient: Mia Yanez  : 1950      Today's Date: 2023          HISTORY OF PRESENT ILLNESS:     History of Present Illness:  She feels OK overall. A little class 2 FAULKNER. No orthopnea. Complains of infrequent CP with exertion relieved with rest. Slight dizziness at times. PAST MEDICAL HISTORY:     Past Medical History:   Diagnosis Date    CAD (coronary artery disease)     NSTEMI - PCI to LCX on 20     Dyslipidemia     Hypertension     Hypothyroid     NSTEMI (non-ST elevated myocardial infarction) ([facility] [address]     NSTEMI 20    PAF (paroxysmal atrial fibrillation) ([facility] [address]     Arrived with Afib with RVR 20 - converted to NSR spontaneously same day    Psychiatric disorder     anxiety    Statin intolerance        Past Surgical History:   Procedure Laterality Date    COLONOSCOPY N/A 2021    COLONOSCOPY performed by Nina Ricketts MD at [place] [address]      HX GYN      tubal ligation    UT UNLISTED PROCEDURE CARDIAC SURGERY      PCI to Cx X 2 FAM         MEDICATIONS:     Current Outpatient Medications   Medication Sig Dispense Refill    ezetimibe (ZETIA) 10 mg tablet TAKE 1 TABLET BY MOUTH  DAILY 90 Tablet 2    Eliquis 5 mg tablet TAKE 1 TABLET BY MOUTH  TWICE DAILY 180 Tablet 3    amLODIPine (NORVASC) 2.5 mg tablet TAKE 1 TABLET BY MOUTH  DAILY 90 Tablet 3    nebivoloL (BYSTOLIC) 10 mg tablet Take 1 Tablet by mouth daily. 30 Tablet 12    cyclobenzaprine (FLEXERIL) 10 mg tablet Take  by mouth three (3) times daily as needed for Muscle Spasm(s). omega 3-DHA-EPA-fish oil 1,000 mg (120 mg-180 mg) capsule Take 3 Caps by mouth daily. cholecalciferol (VITAMIN D3) (5000 Units/125 mcg) tab tablet Take 5,000 Units by mouth daily. ascorbic acid, vitamin C, (VITAMIN C) 1,000 mg tablet Take 1,000 mg by mouth daily. liothyronine (CYTOMEL) 5 mcg tablet Take 2.5 mcg by mouth daily.       levothyroxine (SYNTHROID) 125 mcg tablet Take 125 mcg by mouth Daily (before breakfast). nitroglycerin (NITROSTAT) 0.4 mg SL tablet 0.4 mg by SubLINGual route every five (5) minutes as needed for Chest Pain. Up to 3 doses. Allergies   Allergen Reactions    Adhesive Rash    Neosporin Plus [Rjkzv-Ytdek-Bjqjjjm-Pramoxine] Rash    Pcn [Penicillins] Rash     Childhood rash           SOCIAL HISTORY:     Social History     Tobacco Use    Smoking status: Never    Smokeless tobacco: Never   Vaping Use    Vaping Use: Never used   Substance Use Topics    Alcohol use: Yes     Comment: rarely    Drug use: Never         FAMILY HISTORY:     Family History   Problem Relation Age of Onset    Heart Disease Mother     Heart Disease Father     Cancer Daughter            REVIEW OF SYMPTOMS:     Review of Symptoms:  Constitutional: Negative for fever, chills  HEENT: Negative for nosebleeds, tinnitus, and vision changes. Respiratory: Negative for cough, wheezing  Cardiovascular: Negative for orthopnea, claudication, syncope, and PND. Gastrointestinal: Negative for abdominal pain, diarrhea, melena. Genitourinary: Negative for dysuria  Musculoskeletal: Negative for myalgias. Skin: Negative for rash  Heme: No problems bleeding. Neurological: Negative for speech change and focal weakness. PHYSICAL EXAM:     Physical Exam:  Visit Vitals  /68 (BP 1 Location: Left upper arm, BP Patient Position: Sitting, BP Cuff Size: Large adult)   Pulse 68   Ht 5' 4\" (1.626 m)   Wt 178 lb (80.7 kg)   SpO2 97%   BMI 30.55 kg/m²       Patient appears generally well, mood and affect are appropriate and pleasant. HEENT:  Hearing intact, non-icteric, normocephalic, atraumatic. Neck Exam: Supple  Lung Exam: Clear to auscultation, even breath sounds. Cardiac Exam: Regular rate and rhythm with 2/6 systolic murmur  Abdomen: Soft, non-tender, normal bowel sounds. No bruits or masses. Extremities: Moves all ext well. No lower extremity edema.   MSKTL: Overall good ROM ext  Skin: No significant rashes  Psych: Appropriate affect  Neuro - Grossly intact      LABS / OTHER STUDIES reviewed:     Lab Results   Component Value Date/Time    Sodium 141 09/13/2022 12:34 PM    Potassium 4.4 09/13/2022 12:34 PM    Chloride 101 09/13/2022 12:34 PM    CO2 20 09/13/2022 12:34 PM    Anion gap 9 06/17/2022 04:54 AM    Glucose 132 (H) 09/13/2022 12:34 PM    BUN 14 09/13/2022 12:34 PM    Creatinine 1.13 (H) 09/13/2022 12:34 PM    BUN/Creatinine ratio 12 09/13/2022 12:34 PM    GFR est AA >60 06/17/2022 04:54 AM    GFR est non-AA 57 (L) 06/17/2022 04:54 AM    Calcium 10.7 (H) 09/13/2022 12:34 PM    Bilirubin, total 0.8 09/13/2022 12:34 PM    Alk.  phosphatase 57 09/13/2022 12:34 PM    Protein, total 7.2 09/13/2022 12:34 PM    Albumin 4.6 09/13/2022 12:34 PM    Globulin 3.2 06/17/2022 04:54 AM    A-G Ratio 1.8 09/13/2022 12:34 PM    ALT (SGPT) 45 (H) 09/13/2022 12:34 PM    AST (SGOT) 50 (H) 09/13/2022 12:34 PM     Lab Results   Component Value Date/Time    WBC 4.0 06/17/2022 04:11 AM    HGB 13.5 06/17/2022 04:11 AM    HCT 40.5 06/17/2022 04:11 AM    PLATELET 060 56/70/9798 04:11 AM    MCV 88.6 06/17/2022 04:11 AM       Lab Results   Component Value Date/Time    Cholesterol, total 314 (H) 06/20/2020 03:34 AM    Cholesterol, Total 297 (H) 09/13/2022 12:34 PM    HDL Cholesterol 41 06/20/2020 03:34 AM    LDL, calculated 202.6 (H) 06/20/2020 03:34 AM    VLDL, calculated 70.4 06/20/2020 03:34 AM    Triglyceride 352 (H) 06/20/2020 03:34 AM    CHOL/HDL Ratio 7.7 (H) 06/20/2020 03:34 AM     Component      Latest Ref Rng & Units 6/10/2022           2:29 PM   LDL-P      <1,000 nmol/L 3,223 (H)   LDL-C(NIH CALC)      0 - 99 mg/dL 221 (H)   HDL-C      >39 mg/dL 47   Triglycerides      0 - 149 mg/dL 233 (H)   Cholesterol, total      100 - 199 mg/dL 315 (H)   HDL-P (Total)      >=30.5 umol/L 33.2   Small LDL-P      <=527 nmol/L 1,749 (H)   LDL size      >20.5 nm 20.8   LP-IR SCORE      <=45 76 (H) Lab Results   Component Value Date/Time    Hemoglobin A1c 5.9 (H) 06/20/2020 03:34 AM    Hemoglobin A1c, External 5.0 01/11/2022 12:00 AM         Labs 1/22 - TSH 0.088, T4 11, CBC OK, CMP Ok, chol 339, , , HDL 56        CARDIAC DIAGNOSTICS:     Cardiac Evaluation Includes:  I reviewed the results below. EKG 6/19/20 - Afib with RVR, , St depression V2-V4   EKG 6/19/20 later in day - sinus cynthia, otherwise normal   EKG 3/28/21 - Afib with RVR, , NSST changes   EKG 3/28/21 # 2 - NSR, possible LAE         Stress test in 2020 at [facility] (Dr. Katia Diane) - was OK per patient      Echo 6/19/20 - LVEF 50-55%, inferolateral and anterolateral HK, mild LAE, MAC and mild MR, AV calcification      Cardiac Cath 6/19/20 - LM 30%, LAD 30%, pLCX 100%,  RCA: subselective inj; MLI - not well visualized.   LVEDP: 9   --->  FAM 2.5 by 33 to LCX     Event Monitor 8/1/20 - 8/30 - normal      Colonoscopy 5/21 - diverticulosis     Echo 6/15/21 -  LVEF 60-65%, MAC, mild AS        ASSESSMENT AND PLAN:     Assessment and Plan:    1) CAD  - presented with CP 6/19/20 and was in afib with RVR and ruled in for NSTEMI   - Cardiac cath 6/19/20 ---> FAM to 100% pLCX stenosis    - See below regarding statin   - Continue BB  - She has infrequent CP - but can walk over an hour   - Continue OAC     2) PAF  - Arrived with Afib with RVR 6/19/20 in setting of NSTEMI - converted to NSR spontaneously same day  - Had another ED visit on 3/28/21 with Afib with RVR - converted to sinus in ED  - Continue Eliquis 5 mg BID long term   - continue BB Bystolic      3) Dyslipidemia   - LDL was 202 on 6/20/20   - she has been intolerant of multiple statins (muscle pain)   - Cont Zetia 10 mg daily   - Rapetha and Melvena Hamming too expensive  - On 1/12/23 - She is willing to try a statin and eat better (rather than take a PSCK9i) ----> Will try crestor 5 mg daily  -- if lipids remain high, then consider repatha      4) HTN  - she was unable to tolerate losartan (made her \"sick\")   - BP OK ---> continue meds     5) AV calcification / Mild AS   - Echo 6/15/21 -  LVEF 60-65%, MAC, mild AS  - follow on serial studies - recheck echo in 1 year     6) Neck / shoulder pain / HA  - seeing a chiropractor and doing better     7) Thyroid disease   - TSH 0.05 --> asked her to work with PCP      7) See NP in 3 months with an echo then.  had a cardiac arrest 6/30/20 and then went to [facility] (she did CPR). Has a daughter in [place].  50 + years. Garrett Aguirre MD, [address], [address]  [place] [address]  [address]  [address]  Ph: [phone]   Ph [phone]

## 2023-01-12 NOTE — PROGRESS NOTES
Orders for See Calderon Ewing in 3 months with an echo then (aortic stenosis)  per Dr. Carmela Haynes

## 2023-01-12 NOTE — PROGRESS NOTES
Chief Complaint   Patient presents with    Follow-up     3 mos    Coronary Artery Disease    Irregular Heart Beat     paf     Vitals:    01/12/23 1553   BP: 126/68   BP 1 Location: Left upper arm   BP Patient Position: Sitting   BP Cuff Size: Large adult   Pulse: 68   Height: 5' 4\" (1.626 m)   Weight: 178 lb (80.7 kg)   SpO2: 97%     Chest pain denied   SOB denied   Palpitations denied   Swelling in hands/feet denied   Dizziness denied   Recent hospital stays denied   Refills denied     LEQVIO was too expensive.

## 2023-02-14 ENCOUNTER — HOSPITAL ENCOUNTER (OUTPATIENT)
Dept: GENERAL RADIOLOGY | Age: 73
Discharge: HOME | End: 2023-02-14
Payer: MEDICARE

## 2023-02-14 ENCOUNTER — TRANSCRIBE ORDER (OUTPATIENT)
Dept: REGISTRATION | Age: 73
End: 2023-02-14

## 2023-02-14 DIAGNOSIS — M53.1 CERVICOBRACHIAL SYNDROME: Primary | ICD-10-CM

## 2023-02-14 DIAGNOSIS — M48.07 LUMBOSACRAL STENOSIS: ICD-10-CM

## 2023-02-14 DIAGNOSIS — M53.1 CERVICOBRACHIAL SYNDROME: ICD-10-CM

## 2023-02-14 PROCEDURE — 72050 X-RAY EXAM NECK SPINE 4/5VWS: CPT

## 2023-02-14 PROCEDURE — 72100 X-RAY EXAM L-S SPINE 2/3 VWS: CPT

## 2023-05-08 RX ORDER — EZETIMIBE 10 MG/1
TABLET ORAL DAILY
Qty: 100 TABLET | Refills: 2 | Status: SHIPPED | OUTPATIENT
Start: 2023-05-08

## 2023-05-08 RX ORDER — AMLODIPINE BESYLATE 2.5 MG/1
TABLET ORAL
Qty: 100 TABLET | Refills: 2 | Status: SHIPPED | OUTPATIENT
Start: 2023-05-08

## 2023-05-08 NOTE — TELEPHONE ENCOUNTER
Refill per VO of Dr. Esdras Fletcher  Last appt: 1/12/23  Future Appointments   Date Time Provider Gray Rocio   [facility]   5/23/2023 11:40 AM HODAN Johnson - NP [facility] BS AMB       Requested Prescriptions     Pending Prescriptions Disp Refills    amLODIPine (NORVASC) 2.5 MG tablet [Pharmacy Med Name: amLODIPine Besylate 2.5 MG Oral Tablet] 100 tablet 2     Sig: TAKE 1 TABLET BY MOUTH  DAILY    ezetimibe (ZETIA) 10 MG tablet [Pharmacy Med Name: Ezetimibe 10 MG Oral Tablet] 100 tablet 2     Sig: TAKE 1 TABLET BY MOUTH DAILY

## 2023-05-23 ENCOUNTER — OFFICE VISIT (OUTPATIENT)
Age: 73
End: 2023-05-23
Payer: MEDICARE

## 2023-05-23 ENCOUNTER — ANCILLARY PROCEDURE (OUTPATIENT)
Age: 73
End: 2023-05-23
Payer: MEDICARE

## 2023-05-23 VITALS
DIASTOLIC BLOOD PRESSURE: 76 MMHG | SYSTOLIC BLOOD PRESSURE: 150 MMHG | BODY MASS INDEX: 30.56 KG/M2 | HEART RATE: 53 BPM | HEIGHT: 64 IN | WEIGHT: 179 LBS

## 2023-05-23 VITALS
HEART RATE: 54 BPM | HEIGHT: 64 IN | OXYGEN SATURATION: 97 % | BODY MASS INDEX: 30.56 KG/M2 | WEIGHT: 179 LBS | DIASTOLIC BLOOD PRESSURE: 70 MMHG | SYSTOLIC BLOOD PRESSURE: 140 MMHG

## 2023-05-23 DIAGNOSIS — I48.0 PAROXYSMAL ATRIAL FIBRILLATION (HCC): ICD-10-CM

## 2023-05-23 DIAGNOSIS — I35.0 AORTIC STENOSIS: ICD-10-CM

## 2023-05-23 DIAGNOSIS — I25.2 HISTORY OF NON-ST ELEVATION MYOCARDIAL INFARCTION (NSTEMI): ICD-10-CM

## 2023-05-23 DIAGNOSIS — I10 PRIMARY HYPERTENSION: ICD-10-CM

## 2023-05-23 DIAGNOSIS — I48.0 PAF (PAROXYSMAL ATRIAL FIBRILLATION) (HCC): ICD-10-CM

## 2023-05-23 DIAGNOSIS — I10 ESSENTIAL HYPERTENSION: ICD-10-CM

## 2023-05-23 DIAGNOSIS — I25.10 CORONARY ARTERY DISEASE INVOLVING NATIVE CORONARY ARTERY OF NATIVE HEART WITHOUT ANGINA PECTORIS: ICD-10-CM

## 2023-05-23 DIAGNOSIS — E78.5 HYPERLIPIDEMIA, UNSPECIFIED HYPERLIPIDEMIA TYPE: Primary | ICD-10-CM

## 2023-05-23 LAB
ECHO AO ASC DIAM: 3.3 CM
ECHO AO ASCENDING AORTA INDEX: 1.76 CM/M2
ECHO AO ROOT DIAM: 3.1 CM
ECHO AO ROOT INDEX: 1.66 CM/M2
ECHO AV AREA PEAK VELOCITY: 2 CM2
ECHO AV AREA VTI: 2.1 CM2
ECHO AV AREA/BSA PEAK VELOCITY: 1.1 CM2/M2
ECHO AV AREA/BSA VTI: 1.1 CM2/M2
ECHO AV MEAN GRADIENT: 9 MMHG
ECHO AV MEAN VELOCITY: 1.4 M/S
ECHO AV PEAK GRADIENT: 16 MMHG
ECHO AV PEAK VELOCITY: 2 M/S
ECHO AV VELOCITY RATIO: 0.6
ECHO AV VTI: 43.4 CM
ECHO BSA: 1.91 M2
ECHO EST RA PRESSURE: 3 MMHG
ECHO LA DIAMETER INDEX: 2.3 CM/M2
ECHO LA DIAMETER: 4.3 CM
ECHO LA TO AORTIC ROOT RATIO: 1.39
ECHO LA VOL 2C: 76 ML (ref 22–52)
ECHO LA VOL 4C: 83 ML (ref 22–52)
ECHO LA VOLUME AREA LENGTH: 85 ML
ECHO LA VOLUME INDEX A2C: 41 ML/M2 (ref 16–34)
ECHO LA VOLUME INDEX A4C: 44 ML/M2 (ref 16–34)
ECHO LA VOLUME INDEX AREA LENGTH: 45 ML/M2 (ref 16–34)
ECHO LV E' LATERAL VELOCITY: 5 CM/S
ECHO LV E' SEPTAL VELOCITY: 4 CM/S
ECHO LV EDV A2C: 72 ML
ECHO LV EDV A4C: 79 ML
ECHO LV EDV BP: 77 ML (ref 56–104)
ECHO LV EDV INDEX A4C: 42 ML/M2
ECHO LV EDV INDEX BP: 41 ML/M2
ECHO LV EDV NDEX A2C: 39 ML/M2
ECHO LV EJECTION FRACTION A2C: 62 %
ECHO LV EJECTION FRACTION A4C: 65 %
ECHO LV EJECTION FRACTION BIPLANE: 64 % (ref 55–100)
ECHO LV ESV A2C: 27 ML
ECHO LV ESV A4C: 28 ML
ECHO LV ESV BP: 28 ML (ref 19–49)
ECHO LV ESV INDEX A2C: 14 ML/M2
ECHO LV ESV INDEX A4C: 15 ML/M2
ECHO LV ESV INDEX BP: 15 ML/M2
ECHO LV FRACTIONAL SHORTENING: 35 % (ref 28–44)
ECHO LV INTERNAL DIMENSION DIASTOLE INDEX: 2.57 CM/M2
ECHO LV INTERNAL DIMENSION DIASTOLIC: 4.8 CM (ref 3.9–5.3)
ECHO LV INTERNAL DIMENSION SYSTOLIC INDEX: 1.66 CM/M2
ECHO LV INTERNAL DIMENSION SYSTOLIC: 3.1 CM
ECHO LV IVSD: 0.9 CM (ref 0.6–0.9)
ECHO LV MASS 2D: 147.8 G (ref 67–162)
ECHO LV MASS INDEX 2D: 79 G/M2 (ref 43–95)
ECHO LV POSTERIOR WALL DIASTOLIC: 0.9 CM (ref 0.6–0.9)
ECHO LV RELATIVE WALL THICKNESS RATIO: 0.38
ECHO LVOT AREA: 3.5 CM2
ECHO LVOT AV VTI INDEX: 0.61
ECHO LVOT DIAM: 2.1 CM
ECHO LVOT MEAN GRADIENT: 3 MMHG
ECHO LVOT PEAK GRADIENT: 5 MMHG
ECHO LVOT PEAK VELOCITY: 1.2 M/S
ECHO LVOT STROKE VOLUME INDEX: 49.2 ML/M2
ECHO LVOT SV: 92.1 ML
ECHO LVOT VTI: 26.6 CM
ECHO MV A VELOCITY: 1.23 M/S
ECHO MV AREA PHT: 2.1 CM2
ECHO MV AREA VTI: 1.8 CM2
ECHO MV E DECELERATION TIME (DT): 356.8 MS
ECHO MV E VELOCITY: 1.3 M/S
ECHO MV E/A RATIO: 1.06
ECHO MV E/E' LATERAL: 26
ECHO MV E/E' RATIO (AVERAGED): 29.25
ECHO MV E/E' SEPTAL: 32.5
ECHO MV LVOT VTI INDEX: 1.87
ECHO MV MAX VELOCITY: 1.5 M/S
ECHO MV MEAN GRADIENT: 3 MMHG
ECHO MV MEAN VELOCITY: 0.7 M/S
ECHO MV PEAK GRADIENT: 9 MMHG
ECHO MV PRESSURE HALF TIME (PHT): 103.5 MS
ECHO MV VTI: 49.8 CM
ECHO RA AREA 4C: 16.8 CM2
ECHO RA END SYSTOLIC VOLUME APICAL 4 CHAMBER INDEX BSA: 23 ML/M2
ECHO RA VOLUME: 43 ML
ECHO RIGHT VENTRICULAR SYSTOLIC PRESSURE (RVSP): 30 MMHG
ECHO RV INTERNAL DIMENSION: 4 CM
ECHO RV TAPSE: 1.8 CM (ref 1.7–?)
ECHO TV REGURGITANT MAX VELOCITY: 2.59 M/S
ECHO TV REGURGITANT PEAK GRADIENT: 27 MMHG

## 2023-05-23 PROCEDURE — G8400 PT W/DXA NO RESULTS DOC: HCPCS

## 2023-05-23 PROCEDURE — 1123F ACP DISCUSS/DSCN MKR DOCD: CPT

## 2023-05-23 PROCEDURE — 99214 OFFICE O/P EST MOD 30 MIN: CPT

## 2023-05-23 PROCEDURE — 3078F DIAST BP <80 MM HG: CPT

## 2023-05-23 PROCEDURE — 93306 TTE W/DOPPLER COMPLETE: CPT

## 2023-05-23 PROCEDURE — 93306 TTE W/DOPPLER COMPLETE: CPT | Performed by: SPECIALIST

## 2023-05-23 PROCEDURE — 1090F PRES/ABSN URINE INCON ASSESS: CPT

## 2023-05-23 PROCEDURE — 3077F SYST BP >= 140 MM HG: CPT

## 2023-05-23 PROCEDURE — G8427 DOCREV CUR MEDS BY ELIG CLIN: HCPCS

## 2023-05-23 PROCEDURE — 3017F COLORECTAL CA SCREEN DOC REV: CPT

## 2023-05-23 PROCEDURE — G8419 CALC BMI OUT NRM PARAM NOF/U: HCPCS

## 2023-05-23 PROCEDURE — 1036F TOBACCO NON-USER: CPT

## 2023-05-23 RX ORDER — AMLODIPINE BESYLATE 5 MG/1
5 TABLET ORAL DAILY
Qty: 90 TABLET | Refills: 3 | Status: SHIPPED | OUTPATIENT
Start: 2023-05-23

## 2023-05-23 NOTE — PROGRESS NOTES
Chief Complaint   Patient presents with    Hyperlipidemia    Hypertension    Follow-up     Arn Copper patient here for ECHO/FUP- AS, SERA,PAF,HTN,Cholesterol     Vitals:    05/23/23 1142 05/23/23 1202   BP: (!) 150/76 (!) 140/70   Pulse: 53 54   SpO2:  97%   Weight: 179 lb (81.2 kg)    Height: 5' 4\" (1.626 m)         Chest pain-one episode took 2 nitros   Ended up vomiting and then felt better  SOB denied   Palpitations denied   Swelling in hands/feet denied   Dizziness slightly    Recent hospital stays denied   Refills denied

## 2023-05-25 DIAGNOSIS — E78.5 HYPERLIPIDEMIA, UNSPECIFIED HYPERLIPIDEMIA TYPE: ICD-10-CM

## 2023-05-26 LAB
CHOLEST SERPL-MCNC: 162 MG/DL
HDLC SERPL-MCNC: 63 MG/DL
HDLC SERPL: 2.6 (ref 0–5)
LDLC SERPL CALC-MCNC: 72.8 MG/DL (ref 0–100)
TRIGL SERPL-MCNC: 131 MG/DL
VLDLC SERPL CALC-MCNC: 26.2 MG/DL

## 2023-06-19 RX ORDER — APIXABAN 5 MG/1
TABLET, FILM COATED ORAL
Qty: 200 TABLET | Refills: 2 | OUTPATIENT
Start: 2023-06-19

## 2023-06-19 NOTE — TELEPHONE ENCOUNTER
Refill per VO of Dr. Corrinne Mascot  Last appt: 1/12/2023    Future Appointments   Date Time Provider Gray Chan   6/23/2023 11:40 AM HODAN Varma NP BS AMB       Requested Prescriptions     Refused Prescriptions Disp Refills    ELIQUIS 5 MG TABS tablet [Pharmacy Med Name: Eliquis 5 MG Oral Tablet] 200 tablet 2     Sig: TAKE 1 TABLET BY MOUTH  TWICE DAILY     Refused By: Vinayak Tan     Reason for Refusal: Request already responded to by other means (e.g. phone or fax)

## 2023-06-23 ENCOUNTER — OFFICE VISIT (OUTPATIENT)
Age: 73
End: 2023-06-23
Payer: MEDICARE

## 2023-06-23 VITALS
BODY MASS INDEX: 30.21 KG/M2 | OXYGEN SATURATION: 98 % | HEART RATE: 65 BPM | WEIGHT: 176 LBS | SYSTOLIC BLOOD PRESSURE: 154 MMHG | RESPIRATION RATE: 16 BRPM | DIASTOLIC BLOOD PRESSURE: 80 MMHG

## 2023-06-23 DIAGNOSIS — I10 HYPERTENSION, UNSPECIFIED TYPE: Primary | ICD-10-CM

## 2023-06-23 DIAGNOSIS — I35.0 AORTIC VALVE STENOSIS, ETIOLOGY OF CARDIAC VALVE DISEASE UNSPECIFIED: ICD-10-CM

## 2023-06-23 DIAGNOSIS — I48.0 PAF (PAROXYSMAL ATRIAL FIBRILLATION) (HCC): ICD-10-CM

## 2023-06-23 DIAGNOSIS — I25.10 CORONARY ARTERY DISEASE INVOLVING NATIVE CORONARY ARTERY OF NATIVE HEART WITHOUT ANGINA PECTORIS: ICD-10-CM

## 2023-06-23 PROCEDURE — 3077F SYST BP >= 140 MM HG: CPT

## 2023-06-23 PROCEDURE — 93005 ELECTROCARDIOGRAM TRACING: CPT

## 2023-06-23 PROCEDURE — 1036F TOBACCO NON-USER: CPT

## 2023-06-23 PROCEDURE — G8400 PT W/DXA NO RESULTS DOC: HCPCS

## 2023-06-23 PROCEDURE — 3079F DIAST BP 80-89 MM HG: CPT

## 2023-06-23 PROCEDURE — G8427 DOCREV CUR MEDS BY ELIG CLIN: HCPCS

## 2023-06-23 PROCEDURE — 1123F ACP DISCUSS/DSCN MKR DOCD: CPT

## 2023-06-23 PROCEDURE — 99214 OFFICE O/P EST MOD 30 MIN: CPT

## 2023-06-23 PROCEDURE — 93010 ELECTROCARDIOGRAM REPORT: CPT

## 2023-06-23 PROCEDURE — 3017F COLORECTAL CA SCREEN DOC REV: CPT

## 2023-06-23 PROCEDURE — G8417 CALC BMI ABV UP PARAM F/U: HCPCS

## 2023-06-23 PROCEDURE — 1090F PRES/ABSN URINE INCON ASSESS: CPT

## 2023-06-23 RX ORDER — LOSARTAN POTASSIUM 25 MG/1
25 TABLET ORAL EVERY EVENING
Qty: 30 TABLET | Refills: 5 | Status: SHIPPED | OUTPATIENT
Start: 2023-06-23

## 2023-06-23 ASSESSMENT — PATIENT HEALTH QUESTIONNAIRE - PHQ9
SUM OF ALL RESPONSES TO PHQ QUESTIONS 1-9: 0
SUM OF ALL RESPONSES TO PHQ9 QUESTIONS 1 & 2: 0
1. LITTLE INTEREST OR PLEASURE IN DOING THINGS: 0
SUM OF ALL RESPONSES TO PHQ QUESTIONS 1-9: 0
SUM OF ALL RESPONSES TO PHQ QUESTIONS 1-9: 0
2. FEELING DOWN, DEPRESSED OR HOPELESS: 0
SUM OF ALL RESPONSES TO PHQ QUESTIONS 1-9: 0

## 2023-07-21 NOTE — PROGRESS NOTES
Patient: Paloma Holt  : 1950    Primary Cardiologist: Melissa Logan MD. [facility]  Last Office Visit: 23    Today's Date: 2023        HISTORY OF PRESENT ILLNESS:     History of Present Illness:    Presents today for follow-up. Doing well. Denies chest pain, worsening BOURGEOIS. BP at home 140s. Attempted losartan again and felt sick with it.        PAST MEDICAL HISTORY:     Past Medical History:   Diagnosis Date    CAD (coronary artery disease)     NSTEMI - PCI to LCX on 20     Dyslipidemia     Hypertension     Hypothyroid     NSTEMI (non-ST elevated myocardial infarction) ([address])     NSTEMI 20    PAF (paroxysmal atrial fibrillation) ([address])     Arrived with Afib with RVR 20 - converted to NSR spontaneously same day    Psychiatric disorder     anxiety    Statin intolerance        Past Surgical History:   Procedure Laterality Date    COLONOSCOPY N/A 2021    COLONOSCOPY performed by Shoaib Mohan MD at [address]      tubal ligation    [place] UNLISTED PROCEDURE CARDIAC SURGERY      PCI to Cx X 2 TEODORA             CURRENT MEDICATIONS:    .  Current Outpatient Medications   Medication Sig Dispense Refill    amLODIPine (NORVASC) 10 MG tablet Take 1 tablet by mouth daily 90 tablet 1    apixaban (ELIQUIS) 5 MG TABS tablet TAKE 1 TABLET BY MOUTH  TWICE DAILY 180 tablet 1    ezetimibe (ZETIA) 10 MG tablet TAKE 1 TABLET BY MOUTH DAILY 100 tablet 2    ascorbic acid (VITAMIN C) 1000 MG tablet Take 1 tablet by mouth daily      vitamin D (CHOLECALCIFEROL) 25 MCG (1000 UT) TABS tablet Take 3 tablets by mouth daily      coenzyme Q10 200 MG CAPS capsule Take 1 capsule by mouth daily      levothyroxine (SYNTHROID) 125 MCG tablet Take 1 tablet by mouth every morning (before breakfast)      liothyronine (CYTOMEL) 5 MCG tablet Take 0.5 tablets by mouth daily Three x per week (Mon-Wed-Fri)      nebivolol (BYSTOLIC) 10 MG tablet Take 1 tablet by

## 2023-07-24 ENCOUNTER — OFFICE VISIT (OUTPATIENT)
Age: 73
End: 2023-07-24
Payer: MEDICARE

## 2023-07-24 VITALS
BODY MASS INDEX: 29.71 KG/M2 | SYSTOLIC BLOOD PRESSURE: 144 MMHG | DIASTOLIC BLOOD PRESSURE: 70 MMHG | OXYGEN SATURATION: 97 % | WEIGHT: 174 LBS | HEIGHT: 64 IN | HEART RATE: 71 BPM

## 2023-07-24 DIAGNOSIS — E78.5 HYPERLIPIDEMIA, UNSPECIFIED HYPERLIPIDEMIA TYPE: ICD-10-CM

## 2023-07-24 DIAGNOSIS — I25.10 CORONARY ARTERY DISEASE INVOLVING NATIVE CORONARY ARTERY OF NATIVE HEART WITHOUT ANGINA PECTORIS: ICD-10-CM

## 2023-07-24 DIAGNOSIS — I10 PRIMARY HYPERTENSION: ICD-10-CM

## 2023-07-24 DIAGNOSIS — I48.0 PAF (PAROXYSMAL ATRIAL FIBRILLATION) (HCC): Primary | ICD-10-CM

## 2023-07-24 DIAGNOSIS — I35.0 AORTIC VALVE STENOSIS, ETIOLOGY OF CARDIAC VALVE DISEASE UNSPECIFIED: ICD-10-CM

## 2023-07-24 PROCEDURE — 1036F TOBACCO NON-USER: CPT

## 2023-07-24 PROCEDURE — 99214 OFFICE O/P EST MOD 30 MIN: CPT

## 2023-07-24 PROCEDURE — 1123F ACP DISCUSS/DSCN MKR DOCD: CPT

## 2023-07-24 PROCEDURE — G8427 DOCREV CUR MEDS BY ELIG CLIN: HCPCS

## 2023-07-24 PROCEDURE — 3078F DIAST BP <80 MM HG: CPT

## 2023-07-24 PROCEDURE — 3077F SYST BP >= 140 MM HG: CPT

## 2023-07-24 PROCEDURE — 1090F PRES/ABSN URINE INCON ASSESS: CPT

## 2023-07-24 PROCEDURE — G8400 PT W/DXA NO RESULTS DOC: HCPCS

## 2023-07-24 PROCEDURE — G8417 CALC BMI ABV UP PARAM F/U: HCPCS

## 2023-07-24 PROCEDURE — 3017F COLORECTAL CA SCREEN DOC REV: CPT

## 2023-07-24 RX ORDER — AMLODIPINE BESYLATE 10 MG/1
10 TABLET ORAL DAILY
Qty: 90 TABLET | Refills: 1 | Status: SHIPPED | OUTPATIENT
Start: 2023-07-24

## 2023-07-24 NOTE — PROGRESS NOTES
Chief Complaint   Patient presents with    Atrial Fibrillation    Hypertension    Coronary Artery Disease     There were no vitals filed for this visit. There were no vitals taken for this visit.      Chest pain             NO  SOB                       SOMETIMES  Swelling                 NO  Dizziness               NO  Recent hospital     NO  Refills                    NO     Covid vaccination  YES  Covid                     NO   PT USED HER PERSONAL BP CUFF, SHE /48

## 2023-07-24 NOTE — PATIENT INSTRUCTIONS
Parameters:  Systolic (top number) [phone]  Diastolic (bottom number) 16-91  Heart rate       How to get your blood pressure checked:   Before  During  After    In the 30 minutes before your BP is taken:   NO Smoking   NO Caffeine   NO Exercise  Make sure the cuff is the right size and in the right place. Wait 5 minutes and retake your BP if needed. In case of machine error. Keep your cuffed arm on a flat surface, like a table, and at heart level. Cuff should be at heart level not your arm Keep a log and bring it to every check up. Sit STILL for 5 minutes prior to taking your BP. Sit upright, back straight, feet flat on the floor. Do not check your blood pressure more then once at a time, repeated attempts will only increase the numbers. Only take your B/P twice a day at least an hour after taking your medication. Preferably once in the morning and once in the evening. If you get 3 or more consecutive readings outside of these parameters or have symptoms please let us know so that we can adjust your medication. If you recently have started, stopped, or changed a medication dosage please give your body at least a week or two to get use to the change. Avoid high-sodium foods  Avoid eating:  Smoked, cured, salted, and canned meat, fish, and poultry. Ham, zarate, hot dogs, and luncheon meats. Regular, hard, and processed cheese and regular peanut butter. Crackers with salted tops, and other salted snack foods such as pretzels, chips, and salted popcorn. Frozen prepared meals, unless labeled low-sodium. Canned and dried soups, broths, and bouillon, unless labeled sodium-free or low-sodium. Canned vegetables, unless labeled sodium-free or low-sodium. Lollie Roll fries, pizza, tacos, and other fast foods. Pickles, olives, ketchup, and other condiments, especially soy sauce, unless labeled sodium-free or low-sodium.

## 2023-07-28 RX ORDER — NEBIVOLOL 10 MG/1
10 TABLET ORAL DAILY
Qty: 90 TABLET | Refills: 2 | Status: SHIPPED | OUTPATIENT
Start: 2023-07-28

## 2023-07-28 NOTE — TELEPHONE ENCOUNTER
Refill per VO of Dr. Kari Byrne  Last appt: 7/24/2023    Future Appointments   Date Time Provider [address]   1/25/2024  1:40 PM Krihsan Trevino MD [facility] AMB       Requested Prescriptions     Signed Prescriptions Disp Refills    nebivolol (BYSTOLIC) 10 MG tablet 90 tablet 2     Sig: Take 1 tablet by mouth daily     Authorizing Provider: Krishan Trevino     Ordering User: Sarina Hairston

## 2023-11-14 RX ORDER — APIXABAN 5 MG/1
TABLET, FILM COATED ORAL
Qty: 180 TABLET | Refills: 3 | Status: SHIPPED | OUTPATIENT
Start: 2023-11-14

## 2023-11-14 NOTE — TELEPHONE ENCOUNTER
Refill per VO of Dr. Ramses Mcdaniel  Last office visit: 7/24/23  F/U office visit- 1/25/24    Future Appointments   Date Time Provider [address] [place] [address]   1/25/2024  1:40 PM Elisabeth Felipe MD [facility] AM[facility]       Requested Prescriptions     Pending Prescriptions Disp Refills    ELIQUIS 5 MG TABS tablet [Pharmacy Med Name: Eliquis 5 MG Oral Tablet] 180 tablet 3     Sig: TAKE 1 TABLET BY MOUTH TWICE  DAILY

## 2023-12-11 RX ORDER — NEBIVOLOL 10 MG/1
10 TABLET ORAL DAILY
Qty: 100 TABLET | Refills: 3 | Status: SHIPPED | OUTPATIENT
Start: 2023-12-11

## 2023-12-11 NOTE — TELEPHONE ENCOUNTER
Refill per VO of Dr. Robert Abarca  Last appt: 1/12/2023    Future Appointments   Date Time Provider [address] [place] [address]   1/25/2024  1:40 PM Samuel Thomas MD [facility] AM[facility]       Requested Prescriptions     Pending Prescriptions Disp Refills    nebivolol (BYSTOLIC) 10 MG tablet [Pharmacy Med Name: Nebivolol HCl 10 MG Oral Tablet] 100 tablet 2     Sig: TAKE 1 TABLET BY MOUTH DAILY

## 2024-01-11 NOTE — TELEPHONE ENCOUNTER
Refill per VO of Dr. Perez  Last appt: 1/12/2023    Future Appointments   Date Time Provider Department Center   1/25/2024  1:40 PM Nicky Perez MD [facility] AM[facility]       Requested Prescriptions     Pending Prescriptions Disp Refills    rosuvastatin (CRESTOR) 5 MG tablet [Pharmacy Med Name: Rosuvastatin Calcium 5 MG Oral Tablet] 100 tablet 0     Sig: TAKE 1 TABLET BY MOUTH AT NIGHT

## 2024-01-17 RX ORDER — ROSUVASTATIN CALCIUM 5 MG/1
5 TABLET, COATED ORAL NIGHTLY
Qty: 100 TABLET | Refills: 1 | Status: SHIPPED | OUTPATIENT
Start: 2024-01-17

## 2024-01-22 ENCOUNTER — TELEPHONE (OUTPATIENT)
Age: 74
End: 2024-01-22

## 2024-01-22 NOTE — TELEPHONE ENCOUNTER
LOV- 7/24/23-5) AV calcification / Mild AS    - Echo 6/15/21 -  LVEF 60-65%, MAC, mild AS   - follow on serial studies - recheck echo in 1 year   - echo reviewed from 5/23/23 - stable, mild AS    Patient is trying to schedule the echo- can she get it this soon?       
Pt called to schedule a echo , pt is in need of a echo order.     Pt # [phone]  
Spoke to patient- patient is scheduled with Dr ePrez on 1/25/24 for 6 month follow up.  
normal mouth and gums/moist

## 2024-01-25 ENCOUNTER — OFFICE VISIT (OUTPATIENT)
Age: 74
End: 2024-01-25
Payer: MEDICARE

## 2024-01-25 VITALS
DIASTOLIC BLOOD PRESSURE: 70 MMHG | WEIGHT: 170 LBS | BODY MASS INDEX: 29.02 KG/M2 | OXYGEN SATURATION: 95 % | HEIGHT: 64 IN | SYSTOLIC BLOOD PRESSURE: 138 MMHG | HEART RATE: 81 BPM

## 2024-01-25 DIAGNOSIS — I25.118 CORONARY ARTERY DISEASE OF NATIVE ARTERY OF NATIVE HEART WITH STABLE ANGINA PECTORIS (HCC): Primary | ICD-10-CM

## 2024-01-25 DIAGNOSIS — I35.0 NONRHEUMATIC AORTIC VALVE STENOSIS: ICD-10-CM

## 2024-01-25 DIAGNOSIS — I48.0 PAF (PAROXYSMAL ATRIAL FIBRILLATION) (HCC): ICD-10-CM

## 2024-01-25 PROCEDURE — 3075F SYST BP GE 130 - 139MM HG: CPT | Performed by: SPECIALIST

## 2024-01-25 PROCEDURE — G8400 PT W/DXA NO RESULTS DOC: HCPCS | Performed by: SPECIALIST

## 2024-01-25 PROCEDURE — 99214 OFFICE O/P EST MOD 30 MIN: CPT | Performed by: SPECIALIST

## 2024-01-25 PROCEDURE — 1036F TOBACCO NON-USER: CPT | Performed by: SPECIALIST

## 2024-01-25 PROCEDURE — G8417 CALC BMI ABV UP PARAM F/U: HCPCS | Performed by: SPECIALIST

## 2024-01-25 PROCEDURE — 3078F DIAST BP <80 MM HG: CPT | Performed by: SPECIALIST

## 2024-01-25 PROCEDURE — 1090F PRES/ABSN URINE INCON ASSESS: CPT | Performed by: SPECIALIST

## 2024-01-25 PROCEDURE — G8484 FLU IMMUNIZE NO ADMIN: HCPCS | Performed by: SPECIALIST

## 2024-01-25 PROCEDURE — G8427 DOCREV CUR MEDS BY ELIG CLIN: HCPCS | Performed by: SPECIALIST

## 2024-01-25 PROCEDURE — 1123F ACP DISCUSS/DSCN MKR DOCD: CPT | Performed by: SPECIALIST

## 2024-01-25 NOTE — PROGRESS NOTES
Patient: Sultana Rodriguez  : 1950    Primary Cardiologist: Nicky Perez MD. [facility]      Today's Date: 2024        HISTORY OF PRESENT ILLNESS:     History of Present Illness:  Feels like body vibrating at times.  Is depressed with  illness.  + back pain  Otherwise OK.   No sig CP or SOB.          PAST MEDICAL HISTORY:     Past Medical History:   Diagnosis Date    CAD (coronary artery disease)     NSTEMI - PCI to LCX on 20     Dyslipidemia     Hypertension     Hypothyroid     NSTEMI (non-ST elevated myocardial infarction) (H[facility])     NSTEMI 20    PAF (paroxysmal atrial fibrillation) ([facility])     Arrived with Afib with RVR 20 - converted to NSR spontaneously same day    Psychiatric disorder     anxiety    Statin intolerance        Past Surgical History:   Procedure Laterality Date    COLONOSCOPY N/A 2021    COLONOSCOPY performed by Antony Cedillo MD at [facility] ENDOSCOPY    CORONARY ANGIOPLASTY WITH STENT PLACEMENT      GYN      tubal ligation    [place] UNLISTED PROCEDURE CARDIAC SURGERY      PCI to Cx X 2 TEODORA             CURRENT MEDICATIONS:    .  Current Outpatient Medications   Medication Sig Dispense Refill    rosuvastatin (CRESTOR) 5 MG tablet TAKE 1 TABLET BY MOUTH AT NIGHT 100 tablet 1    nebivolol (BYSTOLIC) 10 MG tablet TAKE 1 TABLET BY MOUTH DAILY 100 tablet 3    ELIQUIS 5 MG TABS tablet TAKE 1 TABLET BY MOUTH TWICE  DAILY 180 tablet 3    amLODIPine (NORVASC) 10 MG tablet Take 1 tablet by mouth daily 90 tablet 1    ezetimibe (ZETIA) 10 MG tablet TAKE 1 TABLET BY MOUTH DAILY 100 tablet 2    ascorbic acid (VITAMIN C) 1000 MG tablet Take 1 tablet by mouth daily      vitamin D (CHOLECALCIFEROL) 25 MCG (1000 UT) TABS tablet Take 3 tablets by mouth daily      coenzyme Q10 200 MG CAPS capsule Take 1 capsule by mouth daily      levothyroxine (SYNTHROID) 125 MCG tablet Take 1 tablet by mouth every morning (before breakfast)      liothyronine (CYTOMEL) 5 MCG tablet Take 0.5 tablets by

## 2024-01-25 NOTE — PROGRESS NOTES
Chief Complaint   Patient presents with    Atrial Fibrillation    Hypertension    Other     NSTEMI     Vitals:    01/25/24 1339   BP: 138/70   Site: Left Upper Arm   Position: Sitting   Cuff Size: Medium Adult   Pulse: 81   SpO2: 95%   Weight: 77.1 kg (170 lb)   Height: 1.626 m (5' 4\")      /70 (Site: Left Upper Arm, Position: Sitting, Cuff Size: Medium Adult)   Pulse 81   Ht 1.626 m (5' 4\")   Wt 77.1 kg (170 lb)   SpO2 95%   BMI 29.18 kg/m²

## 2024-02-05 DIAGNOSIS — I10 PRIMARY HYPERTENSION: ICD-10-CM

## 2024-02-05 RX ORDER — AMLODIPINE BESYLATE 10 MG/1
10 TABLET ORAL DAILY
Qty: 90 TABLET | Refills: 3 | Status: SHIPPED | OUTPATIENT
Start: 2024-02-05

## 2024-02-05 NOTE — TELEPHONE ENCOUNTER
Refill per VO of Dr. Perez  Last appt: 1/25/2024    Future Appointments   Date Time Provider Department Center   7/25/2024 11:20 AM Nicky Perez MD [facility]       Requested Prescriptions     Signed Prescriptions Disp Refills    amLODIPine (NORVASC) 10 MG tablet 90 tablet 3     Sig: Take 1 tablet by mouth daily     Authorizing Provider: NICKY PEREZ     Ordering User: MAI BAKER

## 2024-02-09 ENCOUNTER — TELEPHONE (OUTPATIENT)
Age: 74
End: 2024-02-09

## 2024-02-09 NOTE — TELEPHONE ENCOUNTER
Pt called and stated that she received her Amlodipine from the pharmacy but it was 10 mg and she is only suppose to take 2.5 mg tabs and she stated that she does NOT have any 2.5 mg tabs left. Please advise. Pt would like the new refill prescription sent to [facility].     [facility] ph # [phone]    Pt ph# [phone]

## 2024-02-09 NOTE — TELEPHONE ENCOUNTER
Patient states that she is suppose to be on 2.5 mg of Amlodipine.    LOV 7/24/23- HTN   - she was unable to tolerate losartan (made her \"sick\")    - BP elevated today in office 140/70 says it is the same at home - will increase amlodipine to 5 mg daily   - on 6/23/23 SBP continues to be 140s - agreeable to trying losartan 25 mg daily  - 7/24/23 - SE to losartan, BP remains 140s most of the time, will increase amlodipine to 10 mg daily - discussed side effects    My chart message sent to patient regarding medication dose.

## 2024-02-14 RX ORDER — EZETIMIBE 10 MG/1
TABLET ORAL DAILY
Qty: 90 TABLET | Refills: 2 | Status: SHIPPED | OUTPATIENT
Start: 2024-02-14

## 2024-02-14 NOTE — TELEPHONE ENCOUNTER
Refill per VO of Dr. Perez  Last appt: 1/25/2024    Future Appointments   Date Time Provider Department Center   7/25/2024 11:20 AM Nicky Perez MD [facility]       Requested Prescriptions     Signed Prescriptions Disp Refills    ezetimibe (ZETIA) 10 MG tablet 90 tablet 2     Sig: TAKE 1 TABLET BY MOUTH DAILY     Authorizing Provider: NICKY PEREZ     Ordering User: LAYLA TURNER

## 2024-06-12 DIAGNOSIS — I10 PRIMARY HYPERTENSION: ICD-10-CM

## 2024-06-12 RX ORDER — AMLODIPINE BESYLATE 10 MG/1
10 TABLET ORAL DAILY
Qty: 90 TABLET | Refills: 2 | Status: SHIPPED | OUTPATIENT
Start: 2024-06-12

## 2024-06-12 NOTE — TELEPHONE ENCOUNTER
Refill per VO of Dr. Perez  Last appt: 1/25/2024    Future Appointments   Date Time Provider Department Center   7/25/2024 11:20 AM Nicky Perez MD [facility]       Requested Prescriptions     Signed Prescriptions Disp Refills    amLODIPine (NORVASC) 10 MG tablet 90 tablet 2     Sig: Take 1 tablet by mouth daily     Authorizing Provider: NICKY PEREZ     Ordering User: LAYLA TURNER

## 2024-06-21 RX ORDER — ROSUVASTATIN CALCIUM 5 MG/1
5 TABLET, COATED ORAL NIGHTLY
Qty: 100 TABLET | Refills: 3 | Status: SHIPPED | OUTPATIENT
Start: 2024-06-21

## 2024-06-21 NOTE — TELEPHONE ENCOUNTER
Refill per VO of Dr. Perez  Last appt: 1/25/2024    Future Appointments   Date Time Provider Department Center   7/25/2024 11:20 AM Nicky Perez MD [facility] AM[facility]       Requested Prescriptions     Signed Prescriptions Disp Refills    rosuvastatin (CRESTOR) 5 MG tablet 100 tablet 3     Sig: TAKE 1 TABLET BY MOUTH AT NIGHT     Authorizing Provider: NICKY PEREZ     Ordering User: MAI BAKER

## 2024-07-25 ENCOUNTER — OFFICE VISIT (OUTPATIENT)
Age: 74
End: 2024-07-25
Payer: MEDICARE

## 2024-07-25 VITALS
BODY MASS INDEX: 29.37 KG/M2 | OXYGEN SATURATION: 96 % | WEIGHT: 172 LBS | DIASTOLIC BLOOD PRESSURE: 66 MMHG | HEART RATE: 68 BPM | HEIGHT: 64 IN | SYSTOLIC BLOOD PRESSURE: 134 MMHG

## 2024-07-25 DIAGNOSIS — I48.91 ATRIAL FIBRILLATION WITH RAPID VENTRICULAR RESPONSE (HCC): ICD-10-CM

## 2024-07-25 DIAGNOSIS — I10 ESSENTIAL (PRIMARY) HYPERTENSION: ICD-10-CM

## 2024-07-25 DIAGNOSIS — R06.83 SNORING: ICD-10-CM

## 2024-07-25 DIAGNOSIS — I25.118 CORONARY ARTERY DISEASE OF NATIVE ARTERY OF NATIVE HEART WITH STABLE ANGINA PECTORIS (HCC): Primary | ICD-10-CM

## 2024-07-25 PROCEDURE — G8400 PT W/DXA NO RESULTS DOC: HCPCS | Performed by: SPECIALIST

## 2024-07-25 PROCEDURE — G8417 CALC BMI ABV UP PARAM F/U: HCPCS | Performed by: SPECIALIST

## 2024-07-25 PROCEDURE — 1036F TOBACCO NON-USER: CPT | Performed by: SPECIALIST

## 2024-07-25 PROCEDURE — 1123F ACP DISCUSS/DSCN MKR DOCD: CPT | Performed by: SPECIALIST

## 2024-07-25 PROCEDURE — 3078F DIAST BP <80 MM HG: CPT | Performed by: SPECIALIST

## 2024-07-25 PROCEDURE — 93010 ELECTROCARDIOGRAM REPORT: CPT | Performed by: SPECIALIST

## 2024-07-25 PROCEDURE — G8427 DOCREV CUR MEDS BY ELIG CLIN: HCPCS | Performed by: SPECIALIST

## 2024-07-25 PROCEDURE — 1090F PRES/ABSN URINE INCON ASSESS: CPT | Performed by: SPECIALIST

## 2024-07-25 PROCEDURE — 3075F SYST BP GE 130 - 139MM HG: CPT | Performed by: SPECIALIST

## 2024-07-25 PROCEDURE — 99214 OFFICE O/P EST MOD 30 MIN: CPT | Performed by: SPECIALIST

## 2024-07-25 PROCEDURE — 93005 ELECTROCARDIOGRAM TRACING: CPT | Performed by: SPECIALIST

## 2024-07-25 RX ORDER — AMLODIPINE BESYLATE 10 MG/1
5 TABLET ORAL DAILY
COMMUNITY

## 2024-07-25 NOTE — PROGRESS NOTES
Patient: Sultana Rodriguez  : 1950    Primary Cardiologist: Nicky Perez MD. [facility]      Today's Date: 2024        HISTORY OF PRESENT ILLNESS:     History of Present Illness:  Feels weaker than she has.   Under stress caring for .   Wakes up with a sore throat every morning - better later.   Body is achy.    No sig CP or SOB.         PAST MEDICAL HISTORY:     Past Medical History:   Diagnosis Date    CAD (coronary artery disease)     NSTEMI - PCI to LCX on 20     Dyslipidemia     Hypertension     Hypothyroid     NSTEMI (non-ST elevated myocardial infarction) (HCC)     NSTEMI 20    PAF (paroxysmal atrial fibrillation) ([facility])     Arrived with Afib with RVR 20 - converted to NSR spontaneously same day    Psychiatric disorder     anxiety    Statin intolerance        Past Surgical History:   Procedure Laterality Date    COLONOSCOPY N/A 2021    COLONOSCOPY performed by Antony Cedillo MD at [facility] ENDOSCOPY    CORONARY ANGIOPLASTY WITH STENT PLACEMENT      GYN      tubal ligation    [place] UNLISTED PROCEDURE CARDIAC SURGERY      PCI to Cx X 2 TEODORA             CURRENT MEDICATIONS:    .  Current Outpatient Medications   Medication Sig Dispense Refill    Omega-3 Fatty Acids (FISH OIL PO) Take by mouth      Turmeric (QC TUMERIC COMPLEX PO) Take by mouth      POTASSIUM PO Take by mouth      amLODIPine (NORVASC) 10 MG tablet Take 0.5 tablets by mouth daily      rosuvastatin (CRESTOR) 5 MG tablet TAKE 1 TABLET BY MOUTH AT NIGHT 100 tablet 3    ezetimibe (ZETIA) 10 MG tablet TAKE 1 TABLET BY MOUTH DAILY 90 tablet 2    nebivolol (BYSTOLIC) 10 MG tablet TAKE 1 TABLET BY MOUTH DAILY 100 tablet 3    ELIQUIS 5 MG TABS tablet TAKE 1 TABLET BY MOUTH TWICE  DAILY 180 tablet 3    ascorbic acid (VITAMIN C) 1000 MG tablet Take 1 tablet by mouth daily      vitamin D (CHOLECALCIFEROL) 25 MCG (1000 UT) TABS tablet Take 3 tablets by mouth daily      coenzyme Q10 200 MG CAPS capsule Take 1 capsule by mouth

## 2024-07-25 NOTE — PATIENT INSTRUCTIONS
[email or web address] and enter H967 to learn more about \"DASH Diet: Care Instructions.\"  Current as of: September 20, 2023  Content Version: 14.1  © 8139-6704 [employer].   Care instructions adapted under license by [employer]. If you have questions about a medical condition or this instruction, always ask your healthcare professional. [employer] disclaims any warranty or liability for your use of this information.

## 2024-07-25 NOTE — PROGRESS NOTES
No chief complaint on file.    There were no vitals filed for this visit.      Chest pain: DENIED     Recent hospital stays: DENIED     Refills: DENIED

## 2024-07-26 RX ORDER — APIXABAN 5 MG/1
TABLET, FILM COATED ORAL
Qty: 180 TABLET | Refills: 3 | Status: SHIPPED | OUTPATIENT
Start: 2024-07-26

## 2024-07-29 RX ORDER — EZETIMIBE 10 MG/1
TABLET ORAL DAILY
Qty: 120 TABLET | Refills: 3 | Status: SHIPPED | OUTPATIENT
Start: 2024-07-29

## 2024-07-29 NOTE — TELEPHONE ENCOUNTER
Refill per VO of Dr. Perez  Last appt: 7/25/2024    Future Appointments   Date Time Provider Department Center   11/6/2024  3:20 PM [facility]   1/27/2025 11:00 AM Nicky Perez MD [facility] AM[facility]       Requested Prescriptions     Pending Prescriptions Disp Refills    ezetimibe (ZETIA) 10 MG tablet [Pharmacy Med Name: Ezetimibe 10 MG Oral Tablet] 100 tablet 2     Sig: TAKE 1 TABLET BY MOUTH DAILY

## 2024-11-06 ENCOUNTER — OFFICE VISIT (OUTPATIENT)
Age: 74
End: 2024-11-06

## 2024-11-06 VITALS
OXYGEN SATURATION: 95 % | HEART RATE: 60 BPM | HEIGHT: 64 IN | SYSTOLIC BLOOD PRESSURE: 126 MMHG | WEIGHT: 171.3 LBS | BODY MASS INDEX: 29.24 KG/M2 | DIASTOLIC BLOOD PRESSURE: 66 MMHG

## 2024-11-06 DIAGNOSIS — I48.91 ATRIAL FIBRILLATION WITH RAPID VENTRICULAR RESPONSE (HCC): ICD-10-CM

## 2024-11-06 DIAGNOSIS — G47.33 OSA (OBSTRUCTIVE SLEEP APNEA): Primary | ICD-10-CM

## 2024-11-06 ASSESSMENT — SLEEP AND FATIGUE QUESTIONNAIRES
HOW LIKELY ARE YOU TO NOD OFF OR FALL ASLEEP WHILE WATCHING TV: SLIGHT CHANCE OF DOZING
ESS TOTAL SCORE: 8
HOW LIKELY ARE YOU TO NOD OFF OR FALL ASLEEP WHILE SITTING INACTIVE IN A PUBLIC PLACE: SLIGHT CHANCE OF DOZING
HOW LIKELY ARE YOU TO NOD OFF OR FALL ASLEEP WHEN YOU ARE A PASSENGER IN A CAR FOR AN HOUR WITHOUT A BREAK: SLIGHT CHANCE OF DOZING
HOW LIKELY ARE YOU TO NOD OFF OR FALL ASLEEP WHILE LYING DOWN TO REST IN THE AFTERNOON WHEN CIRCUMSTANCES PERMIT: MODERATE CHANCE OF DOZING
HOW LIKELY ARE YOU TO NOD OFF OR FALL ASLEEP WHILE SITTING AND TALKING TO SOMEONE: WOULD NEVER DOZE
HOW LIKELY ARE YOU TO NOD OFF OR FALL ASLEEP WHILE SITTING QUIETLY AFTER LUNCH WITHOUT ALCOHOL: SLIGHT CHANCE OF DOZING
HOW LIKELY ARE YOU TO NOD OFF OR FALL ASLEEP WHILE SITTING AND READING: SLIGHT CHANCE OF DOZING
HOW LIKELY ARE YOU TO NOD OFF OR FALL ASLEEP IN A CAR, WHILE STOPPED FOR A FEW MINUTES IN TRAFFIC: SLIGHT CHANCE OF DOZING

## 2024-11-06 NOTE — PROGRESS NOTES
ezetimibe (ZETIA) 10 MG tablet, TAKE 1 TABLET BY MOUTH DAILY, Disp: 120 tablet, Rfl: 3    apixaban (ELIQUIS) 5 MG TABS tablet, TAKE 1 TABLET BY MOUTH TWICE  DAILY, Disp: 180 tablet, Rfl: 3    Omega-3 Fatty Acids (FISH OIL PO), Take by mouth, Disp: , Rfl:     Turmeric (QC TUMERIC COMPLEX PO), Take by mouth, Disp: , Rfl:     POTASSIUM PO, Take by mouth, Disp: , Rfl:     amLODIPine (NORVASC) 10 MG tablet, Take 0.5 tablets by mouth daily, Disp: , Rfl:     rosuvastatin (CRESTOR) 5 MG tablet, TAKE 1 TABLET BY MOUTH AT NIGHT, Disp: 100 tablet, Rfl: 3    nebivolol (BYSTOLIC) 10 MG tablet, TAKE 1 TABLET BY MOUTH DAILY, Disp: 100 tablet, Rfl: 3    ascorbic acid (VITAMIN C) 1000 MG tablet, Take 1 tablet by mouth daily, Disp: , Rfl:     vitamin D (CHOLECALCIFEROL) 25 MCG (1000 UT) TABS tablet, Take 3 tablets by mouth daily, Disp: , Rfl:     coenzyme Q10 200 MG CAPS capsule, Take 1 capsule by mouth daily, Disp: , Rfl:     levothyroxine (SYNTHROID) 125 MCG tablet, Take 1 tablet by mouth every morning (before breakfast), Disp: , Rfl:     liothyronine (CYTOMEL) 5 MCG tablet, Take 0.5 tablets by mouth daily Three x per week (Mon-Wed-Fri), Disp: , Rfl:     nitroGLYCERIN (NITROSTAT) 0.4 MG SL tablet, Place 1 tablet under the tongue every 5 minutes as needed for Chest pain (Patient not taking: Reported on 11/6/2024), Disp: , Rfl:      She  has a past medical history of CAD (coronary artery disease), Dyslipidemia, Hypertension, Hypothyroid, NSTEMI (non-ST elevated myocardial infarction) (HCC), PAF (paroxysmal atrial fibrillation) (HCC), Psychiatric disorder, and Statin intolerance.    She  has a past surgical history that includes gyn; pr unlisted procedure cardiac surgery; Coronary angioplasty with stent; and Colonoscopy (N/A, 5/12/2021).    She family history includes Cancer in her daughter; Heart Disease in her father and mother.    She  reports that she has never smoked. She has never been exposed to tobacco smoke. She has never

## 2024-12-02 RX ORDER — NEBIVOLOL 10 MG/1
10 TABLET ORAL DAILY
Qty: 90 TABLET | Refills: 3 | Status: SHIPPED | OUTPATIENT
Start: 2024-12-02

## 2024-12-02 NOTE — TELEPHONE ENCOUNTER
Refill per VO of Dr. Perez  Last appt: 7/25/2024    Future Appointments   Date Time Provider Department Center   12/12/2024  2:30 PM [facility] BS AMB   1/27/2025 11:00 AM Nicky Perez MD [facility] BS AMB       Requested Prescriptions     Signed Prescriptions Disp Refills    nebivolol (BYSTOLIC) 10 MG tablet 90 tablet 3     Sig: TAKE 1 TABLET BY MOUTH DAILY     Authorizing Provider: NICKY PEREZ     Ordering User: TAO ZEPEDA

## 2024-12-12 ENCOUNTER — HOSPITAL ENCOUNTER (OUTPATIENT)
Facility: HOSPITAL | Age: 74
Discharge: HOME | End: 2024-12-15
Payer: MEDICARE

## 2024-12-12 ENCOUNTER — PROCEDURE VISIT (OUTPATIENT)
Age: 74
End: 2024-12-12

## 2024-12-12 DIAGNOSIS — G47.33 OSA (OBSTRUCTIVE SLEEP APNEA): ICD-10-CM

## 2024-12-12 DIAGNOSIS — G47.33 OSA (OBSTRUCTIVE SLEEP APNEA): Primary | ICD-10-CM

## 2024-12-12 PROCEDURE — 95800 SLP STDY UNATTENDED: CPT | Performed by: SPECIALIST

## 2024-12-12 NOTE — PROGRESS NOTES
[address]  [place] [address]  Tel.  [phone]  Fax. [phone] [address]  [place] [address]  Tel.  [phone]  Fax. [phone] [address] [place] [address]  Tel.  [phone]  Fax. [phone]       Sultana Rodriguez is seen today to receive a WatchPAT home sleep apnea testing (HSAT) device.    The WatchPAT HSAT Agreement was reviewed and endorsed by patient.  General information regarding operation of the HSAT device was provided.  Patient viewed the Way2PayT instructional video while in the clinic.  Patient was advised to contact patient support for any problems using the device.  Patient was advised to complete the Morning Questionnaire after awakening/ending the test.    There were no vitals taken for this visit.    Patient will return the HSAT device by noon unless otherwise approved.  Patient will be contacted with results once the study has been reviewed by the physician, typically within 15 business days.    [employer] Serial Number [id number]

## 2024-12-13 ENCOUNTER — PROCEDURE VISIT (OUTPATIENT)
Age: 74
End: 2024-12-13

## 2024-12-13 DIAGNOSIS — G47.33 OSA (OBSTRUCTIVE SLEEP APNEA): Primary | ICD-10-CM

## 2024-12-27 ENCOUNTER — CLINICAL DOCUMENTATION (OUTPATIENT)
Age: 74
End: 2024-12-27

## 2024-12-27 NOTE — PROGRESS NOTES
WatchPAT HSAT performed for potential sleep disordered breathing.  10 hours 52 minutes recorded of  which 9 hours 35 minutes of sleep; 5.9% of sleep in rem.  All sleep stages observed.  Patient slept supine and lateral.    AHI 1.3/h (4% desaturation definition).  Snoring noted.  Minimal SaO2 briefly 80%; baseline greater than 90%.    Impression: HSAT did not demonstrate significant sleep disordered breathing.    Sleep technologist: Please advise patient of HSAT results.

## 2024-12-30 RX ORDER — NEBIVOLOL 10 MG/1
10 TABLET ORAL DAILY
Qty: 90 TABLET | Refills: 3 | OUTPATIENT
Start: 2024-12-30

## 2024-12-30 NOTE — TELEPHONE ENCOUNTER
Refill per VO of DR. PEREZ  Last appt: 7/25/2024    Future Appointments   Date Time Provider Department Center   1/27/2025 11:00 AM Nicky Perez MD [facility] BS AMB   9/12/2025 10:40 AM Nicky Perez MD [facility] AMB       Requested Prescriptions     Pending Prescriptions Disp Refills    nebivolol (BYSTOLIC) 10 MG tablet 90 tablet 3     Sig: Take 1 tablet by mouth daily

## 2025-06-09 RX ORDER — ROSUVASTATIN CALCIUM 5 MG/1
5 TABLET, COATED ORAL NIGHTLY
Qty: 100 TABLET | Refills: 1 | Status: SHIPPED | OUTPATIENT
Start: 2025-06-09

## 2025-06-09 NOTE — TELEPHONE ENCOUNTER
Refill per VO of Dr. Perez  Last appt: 7/25/2024    Future Appointments   Date Time Provider Department Center   9/12/2025 10:40 AM Nicky Perez MD [facility] BS AMB       Requested Prescriptions     Signed Prescriptions Disp Refills    rosuvastatin (CRESTOR) 5 MG tablet 100 tablet 1     Sig: TAKE 1 TABLET BY MOUTH AT NIGHT     Authorizing Provider: NICKY PEREZ     Ordering User: MAI BAKER

## 2025-07-03 RX ORDER — APIXABAN 5 MG/1
5 TABLET, FILM COATED ORAL 2 TIMES DAILY
Qty: 180 TABLET | Refills: 1 | Status: SHIPPED | OUTPATIENT
Start: 2025-07-03

## 2025-07-22 RX ORDER — EZETIMIBE 10 MG/1
TABLET ORAL DAILY
Qty: 100 TABLET | Refills: 1 | Status: SHIPPED | OUTPATIENT
Start: 2025-07-22

## 2025-07-22 NOTE — TELEPHONE ENCOUNTER
Refill per VO of Dr. Perez  Last appt: 7/25/2024  NV needed x6 mos  Future Appointments   Date Time Provider Department Center   9/12/2025 10:40 AM Nicky Perez MD [facility] BS AMB       Requested Prescriptions     Signed Prescriptions Disp Refills    ezetimibe (ZETIA) 10 MG tablet 100 tablet 1     Sig: TAKE 1 TABLET BY MOUTH DAILY     Authorizing Provider: NICKY PEREZ     Ordering User: MAI BKAER

## 2025-08-08 RX ORDER — NEBIVOLOL 10 MG/1
10 TABLET ORAL DAILY
Qty: 100 TABLET | Refills: 1 | Status: SHIPPED | OUTPATIENT
Start: 2025-08-08

## (undated) DEVICE — SNARE ENDOSCP M L240CM W27MM SHTH DIA2.4MM CHN 2.8MM OVL

## (undated) DEVICE — CATH DIAG D 5F 155 MULTIPK X3 -- IMPULSE 16391-302

## (undated) DEVICE — HI-TORQUE BALANCE MIDDLEWEIGHT UNIVERSAL GUIDE WIRE .014 STRAIGHT TIP 3.0 CM X 300 CM: Brand: HI-TORQUE BALANCE MIDDLEWEIGHT UNIVERSAL

## (undated) DEVICE — RUNTHROUGH NS EXTRA FLOPPY PTCA GUIDEWIRE: Brand: RUNTHROUGH

## (undated) DEVICE — POLYP TRAP: Brand: TRAPEASE®

## (undated) DEVICE — ELECTRODE,RADIOTRANSLUCENT,FOAM,3PK: Brand: MEDLINE

## (undated) DEVICE — SYRINGE ANGIO 10ML RED FLAT GRP FIX M LUER CONN MEDALLION

## (undated) DEVICE — CATHETER PTCA L138CM BLLN L12MM DIA2.75MM 0.025IN 18ATM

## (undated) DEVICE — ANGIOGRAPHIC CATHETER: Brand: EXPO™

## (undated) DEVICE — SOLIDIFIER MEDC 1200ML -- CONVERT TO 356117

## (undated) DEVICE — Device

## (undated) DEVICE — TR BAND RADIAL ARTERY COMPRESSION DEVICE: Brand: TR BAND

## (undated) DEVICE — CONTAINER SPEC 20 ML LID NEUT BUFF FORMALIN 10 % POLYPR STS

## (undated) DEVICE — SIMPLICITY FLUFF UNDERPAD 23X36, MODERATE: Brand: SIMPLICITY

## (undated) DEVICE — VALVE ANGIO ID0.11IN HEMSTAT MTL GUID WIRE INSRT TOOL AND

## (undated) DEVICE — BITEBLOCK ENDOSCP 60FR MAXI WHT POLYETH STURDY W/ VELC WVN

## (undated) DEVICE — CATH DIAG IMPLS AL1 6FRX100CM -- IMPULSE 16599-96

## (undated) DEVICE — TUBING PRSS MON L60IN PVC RIG NONEXPANDING M TO FEM CONN

## (undated) DEVICE — DEVICE INFL 20ML 30ATM DGT FLD DISPNS SYR W ACCESSPLUS BLU

## (undated) DEVICE — COPILOT BLEEDBACK CONTROL VALVE: Brand: COPILOT

## (undated) DEVICE — PINNACLE PRECISION ACCESS SYSTEM INTRODUCER SHEATH: Brand: PINNACLE PRECISION ACCESS SYSTEM

## (undated) DEVICE — CANNULA CUSH AD W/ 14FT TBG

## (undated) DEVICE — HI-TORQUE BALANCE MIDDLEWEIGHT UNIVERSAL GUIDE WIRE .014 STRAIGHT TIP 3.0 CM X 190 CM: Brand: HI-TORQUE BALANCE MIDDLEWEIGHT UNIVERSAL

## (undated) DEVICE — ANGIOGRAPHIC CATHETER: Brand: IMPULSE™

## (undated) DEVICE — SUPPORT WRST AD W3.5XL9IN DIA14.5IN ART SFT ADJ HK AND LOOP

## (undated) DEVICE — FIXED CORE WIRE GUIDE SAFE-T-J, CURVED: Brand: COOK

## (undated) DEVICE — BAG SPEC BIOHZRD 10 X 10 IN --

## (undated) DEVICE — KIT COLON W/ 1.1OZ LUB AND 2 END

## (undated) DEVICE — CUFF RMFG BP INF SZ 11 DISP -- LAWSON OEM ITEM 238915

## (undated) DEVICE — SET GRAV CK VLV NEEDLESS ST 3 GANGED 4WAY STPCOCK HI FLO 10

## (undated) DEVICE — Device: Brand: EAGLE EYE PLATINUM RX DIGITAL IVUS CATHETER

## (undated) DEVICE — BAG BELONG PT PERS CLEAR HANDL

## (undated) DEVICE — CATH GUID COR EB40 6FR 100CM -- LAUNCHER

## (undated) DEVICE — HI-TORQUE VERSACORE MODIFIED J GUIDE WIRE SYSTEM 260 CM: Brand: HI-TORQUE VERSACORE

## (undated) DEVICE — CATH DIAG WR5 EXPO 5FRX100CM -- EXPO

## (undated) DEVICE — CATH GUID COR EB35 6FR 100CM -- LAUNCHER

## (undated) DEVICE — CATH IV AUTOGRD BC PNK 20GA 25 -- INSYTE

## (undated) DEVICE — FORCEPS BX L240CM JAW DIA2.8MM L CAP W/ NDL MIC MESH TOOTH

## (undated) DEVICE — 1200 GUARD II KIT W/5MM TUBE W/O VAC TUBE: Brand: GUARDIAN

## (undated) DEVICE — GLIDESHEATH SLENDER ACCESS KIT: Brand: GLIDESHEATH SLENDER

## (undated) DEVICE — PACK PROCEDURE SURG HRT CATH

## (undated) DEVICE — TUBING PRSS MON L6IN PVC M FEM CONN

## (undated) DEVICE — GUIDEWIRE VASC L145CM DIA0.035IN TIP L71CM PTFE S STL STR

## (undated) DEVICE — CATHETER BLLN 142CM  SPRINTER LEGEND RX 2MM X 12MM GW

## (undated) DEVICE — CATH GUID COR EB30 6FR 100CM -- LAUNCHER

## (undated) DEVICE — (D)SENSOR RMFG 02 PULS OXMTR -- DISC BY MFR USE ITEM 133445

## (undated) DEVICE — SYR MED COLOR CODED 3ML RED -- MEDALLION

## (undated) DEVICE — PROCEDURE KIT FLUID MGMT CUST MAINFOLD STRL

## (undated) DEVICE — 3M™ CUROS™ DISINFECTING CAP FOR NEEDLELESS CONNECTORS 270/CARTON 20 CARTONS/CASE CFF1-270: Brand: CUROS™

## (undated) DEVICE — ROSEN CURVED WIRE GUIDE: Brand: ROSEN

## (undated) DEVICE — RADIFOCUS GLIDEWIRE: Brand: GLIDEWIRE